# Patient Record
Sex: MALE | Race: WHITE | Employment: OTHER | URBAN - METROPOLITAN AREA
[De-identification: names, ages, dates, MRNs, and addresses within clinical notes are randomized per-mention and may not be internally consistent; named-entity substitution may affect disease eponyms.]

---

## 2017-01-12 ENCOUNTER — APPOINTMENT (OUTPATIENT)
Dept: LAB | Age: 67
End: 2017-01-12
Attending: FAMILY MEDICINE
Payer: MEDICARE

## 2017-01-12 ENCOUNTER — OFFICE VISIT (OUTPATIENT)
Dept: FAMILY MEDICINE CLINIC | Facility: CLINIC | Age: 67
End: 2017-01-12

## 2017-01-12 VITALS
WEIGHT: 182 LBS | HEART RATE: 60 BPM | DIASTOLIC BLOOD PRESSURE: 74 MMHG | HEIGHT: 66.5 IN | SYSTOLIC BLOOD PRESSURE: 144 MMHG | BODY MASS INDEX: 28.9 KG/M2

## 2017-01-12 DIAGNOSIS — Z12.5 SCREENING FOR PROSTATE CANCER: ICD-10-CM

## 2017-01-12 DIAGNOSIS — Z85.038 HISTORY OF COLON CANCER: ICD-10-CM

## 2017-01-12 DIAGNOSIS — Z00.00 ENCOUNTER FOR MEDICARE ANNUAL WELLNESS EXAM: ICD-10-CM

## 2017-01-12 DIAGNOSIS — M10.9 GOUT OF RIGHT FOOT, UNSPECIFIED CAUSE, UNSPECIFIED CHRONICITY: ICD-10-CM

## 2017-01-12 DIAGNOSIS — C43.9 MALIGNANT MELANOMA, UNSPECIFIED SITE (HCC): ICD-10-CM

## 2017-01-12 DIAGNOSIS — I25.10 CORONARY ARTERY DISEASE INVOLVING NATIVE CORONARY ARTERY OF NATIVE HEART WITHOUT ANGINA PECTORIS: ICD-10-CM

## 2017-01-12 DIAGNOSIS — I10 ESSENTIAL HYPERTENSION: ICD-10-CM

## 2017-01-12 DIAGNOSIS — Z86.59 HISTORY OF POSTTRAUMATIC STRESS DISORDER (PTSD): ICD-10-CM

## 2017-01-12 DIAGNOSIS — Z00.00 ENCOUNTER FOR MEDICARE ANNUAL WELLNESS EXAM: Primary | ICD-10-CM

## 2017-01-12 DIAGNOSIS — Z00.00 ENCOUNTER FOR ANNUAL HEALTH EXAMINATION: ICD-10-CM

## 2017-01-12 DIAGNOSIS — E87.6 HYPOKALEMIA: ICD-10-CM

## 2017-01-12 LAB
PSA SERPL-MCNC: 0.5 NG/ML (ref 0–4)
URATE SERPL-MCNC: 4.8 MG/DL (ref 3.3–8.7)

## 2017-01-12 PROCEDURE — 84550 ASSAY OF BLOOD/URIC ACID: CPT

## 2017-01-12 PROCEDURE — 36415 COLL VENOUS BLD VENIPUNCTURE: CPT

## 2017-01-12 PROCEDURE — G0439 PPPS, SUBSEQ VISIT: HCPCS | Performed by: FAMILY MEDICINE

## 2017-01-12 RX ORDER — ALLOPURINOL 100 MG/1
100 TABLET ORAL DAILY
Qty: 90 TABLET | Refills: 3 | Status: SHIPPED | OUTPATIENT
Start: 2017-01-12 | End: 2021-02-12

## 2017-01-12 NOTE — PROGRESS NOTES
HPI:   Heath Victor is a 74year old male who presents for a Medicare Initial Preventative Physical Exam (Welcome to Medicare- < 12 months on Medicare). .   His last annual assessment has been over 1 year: Annual Physical due on 08/25/2016 capsule (100 mg total) by mouth 3 (three) times daily. ATORVASTATIN CALCIUM 40 MG Oral Tab TAKE 1 TABLET BY MOUTH NIGHTLY   famoTIDine 20 MG Oral Tab Take 1 tablet (20 mg total) by mouth 2 (two) times daily as needed for Heartburn.    Clopidogrel Bisulfat Pulse 60  Ht 5' 6.5\" (1.689 m)  Wt 182 lb (82.555 kg)  BMI 28.94 kg/m2  Estimated body mass index is 28.94 kg/(m^2) as calculated from the following:    Height as of this encounter: 5' 6.5\" (1.689 m). Weight as of this encounter: 182 lb (82.555 kg). Normocephalic, without obvious abnormality, atraumatic   Eyes:  PERRL, conjunctiva/corneas clear, EOM's intact, both eyes   Ears:  Normal TM's and external ear canals, both ears   Nose: Nares normal, septum midline, mucosa normal, no drainage or sinus tend stress disorder (PTSD)  On meds.   Coronary artery disease involving native coronary artery of native heart without angina pectoris  F/u Dr. Fredi Cage  Hypokalemia  Has f/u ordered  Screening for prostate cancer  Will order test.       Mr. Chepe Adler already cyndee Problems? : No    Difficulty walking?: No    Difficulty dressing or bathing?: No    Problems with daily activities? : No    Memory Problems?: No      Fall/Risk Assessment     Do you have 3 or more medical conditions?: 1-Yes    Have you fallen in the last 1 Value   11/14/2016 83     CALCULATED LDL (mg/dL)   Date Value   08/25/2015 123*        EKG - w/ Initial Preventative Physical Exam only, or if medically necessary Electrocardiogram date08/29/2016    Colorectal Cancer Screening      Colonoscopy Screen every found.    Creat/alb ratio  Annually      LDL  Annually LDL CHOLESTEROL (mg/dL)   Date Value   11/14/2016 83     CALCULATED LDL (mg/dL)   Date Value   08/25/2015 123*    No flowsheet data found. Dilated Eye exam  Annually No flowsheet data found.   No fl

## 2017-01-12 NOTE — PATIENT INSTRUCTIONS
Mack Olivia's SCREENING SCHEDULE   Tests on this list are recommended by your physician but may not be covered, or covered at this frequency, by your insurer. Please check with your insurance carrier before scheduling to verify coverage.     Frankie Garay who meet one of the following criteria:   • Men who are 73-68 years old and have smoked more than 100 cigarettes in their lifetime   • Anyone with a family history    Colorectal Cancer Screening Covered up to Age 76     Colonoscopy Screen   Covered every 1 Illicit injectable drug abusers     Tetanus Toxoid- Only covered with a cut with metal- TD and TDaP Not covered by Medicare Part B) No orders found for this or any previous visit.  This may be covered with your prescription benefits, but Medicare does not

## 2017-02-09 ENCOUNTER — OFFICE VISIT (OUTPATIENT)
Dept: DERMATOLOGY CLINIC | Facility: CLINIC | Age: 67
End: 2017-02-09

## 2017-02-09 DIAGNOSIS — D23.30 BENIGN NEOPLASM OF SKIN OF FACE: ICD-10-CM

## 2017-02-09 DIAGNOSIS — L82.1 SEBORRHEIC KERATOSES: ICD-10-CM

## 2017-02-09 DIAGNOSIS — D23.60 BENIGN NEOPLASM OF SKIN OF UPPER LIMB, INCLUDING SHOULDER, UNSPECIFIED LATERALITY: ICD-10-CM

## 2017-02-09 DIAGNOSIS — D22.9 MULTIPLE NEVI: ICD-10-CM

## 2017-02-09 DIAGNOSIS — D23.5 BENIGN NEOPLASM OF SKIN OF TRUNK, EXCEPT SCROTUM: ICD-10-CM

## 2017-02-09 DIAGNOSIS — D23.70 BENIGN NEOPLASM OF SKIN OF LOWER LIMB, INCLUDING HIP, UNSPECIFIED LATERALITY: ICD-10-CM

## 2017-02-09 DIAGNOSIS — D23.4 BENIGN NEOPLASM OF SCALP AND SKIN OF NECK: ICD-10-CM

## 2017-02-09 DIAGNOSIS — Z85.820 PERSONAL HISTORY OF MALIGNANT MELANOMA OF SKIN: Primary | ICD-10-CM

## 2017-02-09 PROCEDURE — 99213 OFFICE O/P EST LOW 20 MIN: CPT | Performed by: DERMATOLOGY

## 2017-02-09 PROCEDURE — G0463 HOSPITAL OUTPT CLINIC VISIT: HCPCS | Performed by: DERMATOLOGY

## 2017-02-09 NOTE — PROGRESS NOTES
Past Medical History   Diagnosis Date   • Colon cancer ([facility] [address]    • Amputation, finger, traumatic      partial amputation of finger.  Multiple accidents with table saws   • Malignant melanoma of right upper extremity (HCC) Unknown     Excision   • High blood

## 2017-02-13 NOTE — PROGRESS NOTES
Sarah Neal is a 69year old male.   HPI:     CC:  Patient presents with:  Derm Problem: new patient.  (previous pt of SD)  Pt here for yearly full body skin exam.  Pt has hx of melanoma (right upper arm)        Allergies:  Review of patient's allergie Tab Take one and one-half tablet at bedtime. Disp: 45 tablet Rfl: 1   aspirin 81 MG Oral Tab Take 1 tablet (81 mg total) by mouth daily. Disp: 90 tablet Rfl: 3   hydrochlorothiazide 12.5 MG Oral Tab Take 1 tablet (12.5 mg total) by mouth daily.  Disp: 90 ta Former Smoker  0.00 Packs/Day     Types: Cigarettes    Smokeless tobacco: Never Used    Alcohol Use: Yes  3.0 oz/week    5 Cans of beer per week         Comment: beer, 4 beers weekly    Drug Use: No    Sexual Activity: Not on file   Not on file  Other Topi Assessment /Plan for additional history and physical exam also:    Assessment / plan:    No orders of the defined types were placed in this encounter.        Meds & Refills for this Visit:  No prescriptions requested or ordered in this encounter      Person

## 2017-04-20 ENCOUNTER — HOSPITAL ENCOUNTER (OUTPATIENT)
Age: 67
Discharge: HOME | End: 2017-04-20
Attending: EMERGENCY MEDICINE
Payer: MEDICARE

## 2017-04-20 VITALS
RESPIRATION RATE: 18 BRPM | HEART RATE: 55 BPM | TEMPERATURE: 97 F | OXYGEN SATURATION: 100 % | BODY MASS INDEX: 29.57 KG/M2 | DIASTOLIC BLOOD PRESSURE: 68 MMHG | WEIGHT: 184 LBS | HEIGHT: 66 IN | SYSTOLIC BLOOD PRESSURE: 144 MMHG

## 2017-04-20 DIAGNOSIS — H61.23 BILATERAL IMPACTED CERUMEN: Primary | ICD-10-CM

## 2017-04-20 PROCEDURE — 99204 OFFICE O/P NEW MOD 45 MIN: CPT

## 2017-04-20 PROCEDURE — 99213 OFFICE O/P EST LOW 20 MIN: CPT

## 2017-04-20 RX ORDER — AMOXICILLIN 500 MG/1
500 TABLET, FILM COATED ORAL 3 TIMES DAILY
Qty: 21 TABLET | Refills: 0 | Status: SHIPPED | OUTPATIENT
Start: 2017-04-20 | End: 2017-04-27

## 2017-04-20 NOTE — ED PROVIDER NOTES
Patient presents with:  Ear Pain      HPI:     Aditya Cuellar is a 69year old male who presents with a chief complaint of left ear pain. Onset of symptoms was in the past day.   Patient feels as though his left ear is clogged up and he has decreased hea patient develops fever or left ear pain to start amoxicillin      Orders Placed This Encounter  amoxicillin 500 MG Oral Tab   Sig: Take 1 tablet (500 mg total) by mouth 3 (three) times daily.    Dispense:  21 tablet   Refill:  0    Labs performed this visit

## 2017-04-20 NOTE — ED INITIAL ASSESSMENT (HPI)
C/o left ear \"clogged\" after showering yesterday. Wears hearing aid. No pain or fever. No dizziness.

## 2017-06-27 ENCOUNTER — PATIENT MESSAGE (OUTPATIENT)
Dept: FAMILY MEDICINE CLINIC | Facility: CLINIC | Age: 67
End: 2017-06-27

## 2017-06-27 ENCOUNTER — HOSPITAL ENCOUNTER (OUTPATIENT)
Age: 67
Discharge: ANOTHER HOSPITAL | End: 2017-06-27
Attending: EMERGENCY MEDICINE
Payer: MEDICARE

## 2017-06-27 ENCOUNTER — APPOINTMENT (OUTPATIENT)
Dept: CT IMAGING | Facility: HOSPITAL | Age: 67
End: 2017-06-27
Attending: EMERGENCY MEDICINE
Payer: MEDICARE

## 2017-06-27 ENCOUNTER — HOSPITAL ENCOUNTER (EMERGENCY)
Facility: HOSPITAL | Age: 67
Discharge: HOME | End: 2017-06-27
Attending: EMERGENCY MEDICINE
Payer: MEDICARE

## 2017-06-27 VITALS
OXYGEN SATURATION: 98 % | BODY MASS INDEX: 29.82 KG/M2 | TEMPERATURE: 98 F | WEIGHT: 190 LBS | HEART RATE: 66 BPM | RESPIRATION RATE: 17 BRPM | DIASTOLIC BLOOD PRESSURE: 84 MMHG | SYSTOLIC BLOOD PRESSURE: 158 MMHG | HEIGHT: 67 IN

## 2017-06-27 VITALS
HEART RATE: 72 BPM | WEIGHT: 190 LBS | RESPIRATION RATE: 18 BRPM | DIASTOLIC BLOOD PRESSURE: 68 MMHG | BODY MASS INDEX: 29.82 KG/M2 | HEIGHT: 67 IN | SYSTOLIC BLOOD PRESSURE: 138 MMHG | TEMPERATURE: 98 F | OXYGEN SATURATION: 99 %

## 2017-06-27 DIAGNOSIS — Z79.01 CHRONIC ANTICOAGULATION: ICD-10-CM

## 2017-06-27 DIAGNOSIS — S00.03XA CONTUSION OF SCALP, INITIAL ENCOUNTER: Primary | ICD-10-CM

## 2017-06-27 DIAGNOSIS — Z79.02 ANTIPLATELET OR ANTITHROMBOTIC LONG-TERM USE: ICD-10-CM

## 2017-06-27 DIAGNOSIS — S09.8XXA BLUNT HEAD TRAUMA, INITIAL ENCOUNTER: Primary | ICD-10-CM

## 2017-06-27 PROCEDURE — 70450 CT HEAD/BRAIN W/O DYE: CPT | Performed by: EMERGENCY MEDICINE

## 2017-06-27 PROCEDURE — 99215 OFFICE O/P EST HI 40 MIN: CPT

## 2017-06-27 PROCEDURE — 99284 EMERGENCY DEPT VISIT MOD MDM: CPT

## 2017-06-27 NOTE — ED PROVIDER NOTES
Patient Seen in: [address]    History   Patient presents with:  Head Injury    Stated Complaint: head injury    HPI    The patient is a 58-year-old male who is on Plavix and aspirin for cardiac stent presents with compla tablet (20 mg total) by mouth 2 (two) times daily as needed for Heartburn. Clopidogrel Bisulfate (PLAVIX) 75 MG Oral Tab,  Take 1 tablet (75 mg total) by mouth daily. cholecalciferol ( VITAMIN D) 1000 UNITS Oral Cap,  Take  by mouth.    Multiple Vitam Course  ------------------------------------------------------------  [facility]     Instructed patient that he is at risk for intracranial injury even though he is asymptomatic at this time. Instructed patient to go to the emergency department.   Patient refuses

## 2017-06-27 NOTE — ED NOTES
Patient advised to go to [address] [place] [address] ED for further evaluation of head injury. Declines 911.

## 2017-06-27 NOTE — ED INITIAL ASSESSMENT (HPI)
45 min PTA hit in head with a piece of drywall knocking him down from scaffolding about 2 ft high falling forward onto concrete. No LOC. Abrasion noted to forehead with large hematoma. Denies neck pain. No nausea or dizziness.

## 2017-06-27 NOTE — ED INITIAL ASSESSMENT (HPI)
Pt sent from [facility] post fall from 2 ft ladder at work when drywall hit in back in head and caused pt to fall face forward onto concrete. Large hematoma with lac to forehead. Pt is on Plavix and aspirin daily. Denies LOC, but did hit head.  Pt is A&Ox4

## 2017-06-27 NOTE — ED PROVIDER NOTES
Patient Seen in: [facility] AND [facility] Emergency Department    History   Patient presents with:  Head Neck Injury (neurologic, musculoskeletal)    Stated Complaint: Pt hit in the back of head with dry wall while at work- states fell off a 16 in*    HPI    Pa 12.5 MG Oral Tab,  Take 1 tablet (12.5 mg total) by mouth daily. Potassium Chloride ER 10 MEQ Oral Tab CR,  Take 1 tablet (10 mEq total) by mouth 2 (two) times daily.    FINASTERIDE 5 MG Oral Tab,  TAKE 1 TABLET BY MOUTH DAILY   ATORVASTATIN CALCIUM 40 MG 95 %  O2 Device: None (Room air)    Current:/84   Pulse 66   Temp 98.4 °F (36.9 °C) (Oral)   Resp 17   Ht 170.2 cm (5' 7\")   Wt 86.2 kg   SpO2 98%   BMI 29.76 kg/m²         Physical Exam  Constitutional:  Alert, well nourished adult lying in bed in

## 2017-06-28 NOTE — TELEPHONE ENCOUNTER
From: Vini Pereira  To: Vineet Calvo DO  Sent: 6/27/2017 9:21 AM CDT  Subject: Other    I take 100 mgs of Sertraline daily as prescribed by Dr Marisas Ureña for PTSD. I get the med from her and that's all!    I stopped seeing Dr Frederick Campos

## 2017-06-29 ENCOUNTER — APPOINTMENT (OUTPATIENT)
Dept: LAB | Age: 67
End: 2017-06-29
Attending: INTERNAL MEDICINE
Payer: MEDICARE

## 2017-07-03 ENCOUNTER — APPOINTMENT (OUTPATIENT)
Dept: LAB | Age: 67
End: 2017-07-03
Attending: INTERNAL MEDICINE
Payer: MEDICARE

## 2017-07-03 DIAGNOSIS — I10 ESSENTIAL HYPERTENSION WITH GOAL BLOOD PRESSURE LESS THAN 140/90: ICD-10-CM

## 2017-07-03 DIAGNOSIS — E78.2 MIXED HYPERLIPIDEMIA: ICD-10-CM

## 2017-07-03 LAB
ALBUMIN SERPL BCP-MCNC: 4.3 G/DL (ref 3.5–4.8)
ALP SERPL-CCNC: 82 U/L (ref 32–100)
ALT SERPL-CCNC: 28 U/L (ref 17–63)
ANION GAP SERPL CALC-SCNC: 10 MMOL/L (ref 0–18)
AST SERPL-CCNC: 26 U/L (ref 15–41)
BILIRUB DIRECT SERPL-MCNC: 0.2 MG/DL (ref 0–0.2)
BILIRUB SERPL-MCNC: 0.9 MG/DL (ref 0.3–1.2)
BUN SERPL-MCNC: 15 MG/DL (ref 8–20)
BUN/CREAT SERPL: 18.3 (ref 10–20)
CALCIUM SERPL-MCNC: 9.4 MG/DL (ref 8.5–10.5)
CHLORIDE SERPL-SCNC: 99 MMOL/L (ref 95–110)
CHOLEST SERPL-MCNC: 165 MG/DL (ref 110–200)
CO2 SERPL-SCNC: 27 MMOL/L (ref 22–32)
CREAT SERPL-MCNC: 0.82 MG/DL (ref 0.5–1.5)
GLUCOSE SERPL-MCNC: 88 MG/DL (ref 70–99)
HDLC SERPL-MCNC: 52 MG/DL
LDLC SERPL CALC-MCNC: 96 MG/DL (ref 0–99)
NONHDLC SERPL-MCNC: 113 MG/DL
OSMOLALITY UR CALC.SUM OF ELEC: 282 MOSM/KG (ref 275–295)
POTASSIUM SERPL-SCNC: 3.4 MMOL/L (ref 3.3–5.1)
PROT SERPL-MCNC: 6.7 G/DL (ref 5.9–8.4)
SODIUM SERPL-SCNC: 136 MMOL/L (ref 136–144)
TRIGL SERPL-MCNC: 84 MG/DL (ref 1–149)

## 2017-07-03 PROCEDURE — 36415 COLL VENOUS BLD VENIPUNCTURE: CPT

## 2017-07-03 PROCEDURE — 80076 HEPATIC FUNCTION PANEL: CPT

## 2017-07-03 PROCEDURE — 80048 BASIC METABOLIC PNL TOTAL CA: CPT

## 2017-07-03 PROCEDURE — 80061 LIPID PANEL: CPT

## 2017-08-31 PROCEDURE — 88305 TISSUE EXAM BY PATHOLOGIST: CPT | Performed by: INTERNAL MEDICINE

## 2017-09-02 PROBLEM — D12.6 TUBULAR ADENOMA OF COLON: Status: ACTIVE | Noted: 2017-09-02

## 2017-09-20 RX ORDER — ATORVASTATIN CALCIUM 40 MG/1
TABLET, FILM COATED ORAL
Qty: 60 TABLET | Refills: 0 | Status: SHIPPED | OUTPATIENT
Start: 2017-09-20 | End: 2017-11-27

## 2017-09-20 NOTE — TELEPHONE ENCOUNTER
Chart reviewed, atorvastatin 40 mg refilled for 2 months.     Cholesterol Medications  Protocol Criteria:  · Appointment scheduled in the past 12 months or in the next 3 months  · ALT & LDL on file in the past 12 months  · ALT result < 80  · LDL result <130

## 2017-11-27 ENCOUNTER — TELEPHONE (OUTPATIENT)
Dept: FAMILY MEDICINE CLINIC | Facility: CLINIC | Age: 67
End: 2017-11-27

## 2017-11-27 RX ORDER — ATORVASTATIN CALCIUM 40 MG/1
TABLET, FILM COATED ORAL
Qty: 180 TABLET | Refills: 0 | Status: SHIPPED | OUTPATIENT
Start: 2017-11-27 | End: 2018-05-18

## 2017-11-27 NOTE — TELEPHONE ENCOUNTER
Refilled per written protocol.     Cholesterol Medications  Protocol Criteria:  · Appointment scheduled in the past 12 months or in the next 3 months  · ALT & LDL on file in the past 12 months  · ALT result < 80  · LDL result <130   Recent Outpatient Visits

## 2017-11-27 NOTE — TELEPHONE ENCOUNTER
Pharmacy calling in requesting refill om medication below. Please advise.        Current Outpatient Prescriptions:   •  ATORVASTATIN 40 MG Oral Tab, TAKE 1 TABLET BY MOUTH EVERY NIGHT AT BEDTIME, Disp: 60 tablet, Rfl: 0

## 2018-01-09 RX ORDER — FINASTERIDE 5 MG/1
TABLET, FILM COATED ORAL
Qty: 90 TABLET | Refills: 0 | Status: SHIPPED | OUTPATIENT
Start: 2018-01-09 | End: 2018-04-08

## 2018-01-26 ENCOUNTER — OFFICE VISIT (OUTPATIENT)
Dept: FAMILY MEDICINE CLINIC | Facility: CLINIC | Age: 68
End: 2018-01-26

## 2018-01-26 VITALS
WEIGHT: 195 LBS | DIASTOLIC BLOOD PRESSURE: 75 MMHG | HEIGHT: 66.5 IN | SYSTOLIC BLOOD PRESSURE: 144 MMHG | HEART RATE: 65 BPM | TEMPERATURE: 98 F | BODY MASS INDEX: 30.97 KG/M2

## 2018-01-26 DIAGNOSIS — J06.9 VIRAL UPPER RESPIRATORY TRACT INFECTION: Primary | ICD-10-CM

## 2018-01-26 PROCEDURE — 99213 OFFICE O/P EST LOW 20 MIN: CPT | Performed by: NURSE PRACTITIONER

## 2018-01-26 NOTE — PROGRESS NOTES
HPI  Pt here for cough, sore throat and runny nose for past day or so. Denies fever, body aches and chills. Took an otc cold medicine-no improvement in s/s. Wife and granddaughter had similar s/s but worse, daughter had the flu.       Review of Systems   C Relation Age of Onset   • Heart Disease Father      CAD   • Hypertension Father    • Heart Disease Mother      CAD   • Hypertension Mother          Social History  Social History   Marital status:   Spouse name: N/A    Years of education: N/A  Mari Rfl:    Multiple Vitamin (MULTI-DAY) Oral Tab Take  by mouth. Disp:  Rfl:        Allergies:  No Known Allergies    Physical Exam   Constitutional: He is oriented to person, place, and time. He appears well-developed and well-nourished.    HENT:   Head: Norm

## 2018-03-12 ENCOUNTER — TELEPHONE (OUTPATIENT)
Dept: FAMILY MEDICINE CLINIC | Facility: CLINIC | Age: 68
End: 2018-03-12

## 2018-03-12 DIAGNOSIS — Z01.10 ENCOUNTER FOR HEARING TEST: Primary | ICD-10-CM

## 2018-03-12 NOTE — TELEPHONE ENCOUNTER
MEDICARE REQUIRES AN ORDER FORE PATIENT TO SEE DR HAYS -- TOMORROW IN [place], [place] AT 3:30- PM    NEEDS AUDITORY TESTING   HEARING TEST.   FAX  # [phone]    Managed care does  Not need referral only a doctors's order          PATIENT NEEDS CALL NAEEM

## 2018-04-12 RX ORDER — FINASTERIDE 5 MG/1
TABLET, FILM COATED ORAL
Qty: 90 TABLET | Refills: 0 | Status: SHIPPED | OUTPATIENT
Start: 2018-04-12 | End: 2018-07-13

## 2018-05-18 RX ORDER — ATORVASTATIN CALCIUM 40 MG/1
TABLET, FILM COATED ORAL
Qty: 180 TABLET | Refills: 0 | Status: SHIPPED | OUTPATIENT
Start: 2018-05-18 | End: 2018-08-17

## 2018-05-18 NOTE — TELEPHONE ENCOUNTER
Cholesterol Medications  Protocol Criteria:  · Appointment scheduled in the past 12 months or in the next 3 months  · ALT & LDL on file in the past 12 months  · ALT result < 80  · LDL result <130   Recent Outpatient Visits            3 months ago Viral upp

## 2018-07-14 NOTE — TELEPHONE ENCOUNTER
Failed per nursing protocol - please advise on refill request     LOV: 1-26-18 Last Rx: 4-12-18     No protocol     Please advise in regards to refill request. Thank You        Hypertensive Medications  Protocol Criteria:  · Appointment scheduled in the p

## 2018-07-19 RX ORDER — FINASTERIDE 5 MG/1
TABLET, FILM COATED ORAL
Qty: 30 TABLET | Refills: 0 | Status: SHIPPED | OUTPATIENT
Start: 2018-07-19 | End: 2018-08-22

## 2018-07-19 NOTE — TELEPHONE ENCOUNTER
Please see below and advise    Unable to fill per protocol    Sent to [facility] for Aia [address] (out of office) and AMA (on call)

## 2018-07-21 RX ORDER — FINASTERIDE 5 MG/1
TABLET, FILM COATED ORAL
Qty: 90 TABLET | Refills: 0 | OUTPATIENT
Start: 2018-07-21

## 2018-08-14 ENCOUNTER — OFFICE VISIT (OUTPATIENT)
Dept: FAMILY MEDICINE CLINIC | Facility: CLINIC | Age: 68
End: 2018-08-14
Payer: MEDICARE

## 2018-08-14 ENCOUNTER — HOSPITAL ENCOUNTER (OUTPATIENT)
Dept: GENERAL RADIOLOGY | Age: 68
Discharge: HOME | End: 2018-08-14
Attending: FAMILY MEDICINE | Admitting: FAMILY MEDICINE
Payer: MEDICARE

## 2018-08-14 ENCOUNTER — LAB ENCOUNTER (OUTPATIENT)
Dept: LAB | Age: 68
End: 2018-08-14
Attending: INTERNAL MEDICINE
Payer: MEDICARE

## 2018-08-14 VITALS
DIASTOLIC BLOOD PRESSURE: 70 MMHG | HEART RATE: 56 BPM | SYSTOLIC BLOOD PRESSURE: 146 MMHG | BODY MASS INDEX: 30.53 KG/M2 | HEIGHT: 66 IN | TEMPERATURE: 98 F | WEIGHT: 190 LBS | RESPIRATION RATE: 16 BRPM

## 2018-08-14 DIAGNOSIS — Z12.5 PROSTATE CANCER SCREENING: Primary | ICD-10-CM

## 2018-08-14 DIAGNOSIS — I10 ESSENTIAL HYPERTENSION WITH GOAL BLOOD PRESSURE LESS THAN 140/90: ICD-10-CM

## 2018-08-14 DIAGNOSIS — M79.605 PAIN OF LEFT LOWER EXTREMITY: ICD-10-CM

## 2018-08-14 DIAGNOSIS — Z12.5 PROSTATE CANCER SCREENING: ICD-10-CM

## 2018-08-14 DIAGNOSIS — E78.2 MIXED HYPERLIPIDEMIA: ICD-10-CM

## 2018-08-14 LAB
ALBUMIN SERPL BCP-MCNC: 4.1 G/DL (ref 3.5–4.8)
ALP SERPL-CCNC: 101 U/L (ref 32–100)
ALT SERPL-CCNC: 18 U/L (ref 17–63)
ANION GAP SERPL CALC-SCNC: 10 MMOL/L (ref 0–18)
AST SERPL-CCNC: 20 U/L (ref 15–41)
BASOPHILS # BLD: 0.1 K/UL (ref 0–0.2)
BASOPHILS NFR BLD: 1 %
BILIRUB DIRECT SERPL-MCNC: 0.1 MG/DL (ref 0–0.2)
BILIRUB SERPL-MCNC: 0.8 MG/DL (ref 0.3–1.2)
BUN SERPL-MCNC: 10 MG/DL (ref 8–20)
BUN/CREAT SERPL: 11.8 (ref 10–20)
CALCIUM SERPL-MCNC: 9.6 MG/DL (ref 8.5–10.5)
CHLORIDE SERPL-SCNC: 101 MMOL/L (ref 95–110)
CHOLEST SERPL-MCNC: 184 MG/DL (ref 110–200)
CO2 SERPL-SCNC: 30 MMOL/L (ref 22–32)
CREAT SERPL-MCNC: 0.85 MG/DL (ref 0.5–1.5)
EOSINOPHIL # BLD: 0 K/UL (ref 0–0.7)
EOSINOPHIL NFR BLD: 0 %
ERYTHROCYTE [DISTWIDTH] IN BLOOD BY AUTOMATED COUNT: 13.8 % (ref 11–15)
GLUCOSE SERPL-MCNC: 119 MG/DL (ref 70–99)
HCT VFR BLD AUTO: 42.5 % (ref 41–52)
HDLC SERPL-MCNC: 63 MG/DL
HGB BLD-MCNC: 14.6 G/DL (ref 13.5–17.5)
LDLC SERPL CALC-MCNC: 100 MG/DL (ref 0–99)
LYMPHOCYTES # BLD: 1.1 K/UL (ref 1–4)
LYMPHOCYTES NFR BLD: 11 %
MCH RBC QN AUTO: 32.6 PG (ref 27–32)
MCHC RBC AUTO-ENTMCNC: 34.3 G/DL (ref 32–37)
MCV RBC AUTO: 95.2 FL (ref 80–100)
MONOCYTES # BLD: 0.7 K/UL (ref 0–1)
MONOCYTES NFR BLD: 7 %
NEUTROPHILS # BLD AUTO: 8.1 K/UL (ref 1.8–7.7)
NEUTROPHILS NFR BLD: 81 %
NONHDLC SERPL-MCNC: 121 MG/DL
OSMOLALITY UR CALC.SUM OF ELEC: 292 MOSM/KG (ref 275–295)
PLATELET # BLD AUTO: 268 K/UL (ref 140–400)
PMV BLD AUTO: 7.7 FL (ref 7.4–10.3)
POTASSIUM SERPL-SCNC: 4.6 MMOL/L (ref 3.3–5.1)
PROT SERPL-MCNC: 7 G/DL (ref 5.9–8.4)
PSA SERPL-MCNC: 0.4 NG/ML (ref 0–4)
RBC # BLD AUTO: 4.47 M/UL (ref 4.5–5.9)
SODIUM SERPL-SCNC: 141 MMOL/L (ref 136–144)
TRIGL SERPL-MCNC: 103 MG/DL (ref 1–149)
URATE SERPL-MCNC: 7.5 MG/DL (ref 3.3–8.7)
WBC # BLD AUTO: 10.1 K/UL (ref 4–11)

## 2018-08-14 PROCEDURE — 36415 COLL VENOUS BLD VENIPUNCTURE: CPT

## 2018-08-14 PROCEDURE — 90670 PCV13 VACCINE IM: CPT | Performed by: FAMILY MEDICINE

## 2018-08-14 PROCEDURE — G0463 HOSPITAL OUTPT CLINIC VISIT: HCPCS | Performed by: FAMILY MEDICINE

## 2018-08-14 PROCEDURE — 80061 LIPID PANEL: CPT

## 2018-08-14 PROCEDURE — 80048 BASIC METABOLIC PNL TOTAL CA: CPT

## 2018-08-14 PROCEDURE — 99213 OFFICE O/P EST LOW 20 MIN: CPT | Performed by: FAMILY MEDICINE

## 2018-08-14 PROCEDURE — G0009 ADMIN PNEUMOCOCCAL VACCINE: HCPCS | Performed by: FAMILY MEDICINE

## 2018-08-14 PROCEDURE — 85025 COMPLETE CBC W/AUTO DIFF WBC: CPT

## 2018-08-14 PROCEDURE — 73630 X-RAY EXAM OF FOOT: CPT | Performed by: FAMILY MEDICINE

## 2018-08-14 PROCEDURE — 84550 ASSAY OF BLOOD/URIC ACID: CPT

## 2018-08-14 PROCEDURE — 80076 HEPATIC FUNCTION PANEL: CPT

## 2018-08-14 RX ORDER — FINASTERIDE 5 MG/1
TABLET, FILM COATED ORAL
Qty: 90 TABLET | Refills: 0 | Status: CANCELLED | OUTPATIENT
Start: 2018-08-14

## 2018-08-14 NOTE — PROGRESS NOTES
Blood pressure 146/70, pulse 56, temperature 98 °F (36.7 °C), temperature source Oral, resp. rate 16, height 5' 6\" (1.676 m), weight 190 lb (86.2 kg). Patient presents today following up for nasal fracture that occurred 1 month ago while working.   He r

## 2018-08-15 ENCOUNTER — TELEPHONE (OUTPATIENT)
Dept: FAMILY MEDICINE CLINIC | Facility: CLINIC | Age: 68
End: 2018-08-15

## 2018-08-15 DIAGNOSIS — I10 ESSENTIAL HYPERTENSION: ICD-10-CM

## 2018-08-15 DIAGNOSIS — F43.10 PTSD (POST-TRAUMATIC STRESS DISORDER): ICD-10-CM

## 2018-08-15 RX ORDER — SERTRALINE HYDROCHLORIDE 100 MG/1
TABLET, FILM COATED ORAL
Qty: 90 TABLET | Refills: 0 | Status: SHIPPED | OUTPATIENT
Start: 2018-08-15 | End: 2018-11-06

## 2018-08-15 NOTE — TELEPHONE ENCOUNTER
----- Message from Buddie Soulier, RN sent at 8/15/2018 12:22 PM CDT -----  Regarding: FW: Prescription Question  Contact: [phone]      ----- Message -----  From: Indy Brock  Sent: 8/14/2018   1:08 PM  To: Em Rn Triage  Subject: John Dietrich

## 2018-08-17 NOTE — TELEPHONE ENCOUNTER
[facility] [phone] is calling for status of medication refill request. Pt is out of medication.

## 2018-08-22 RX ORDER — FINASTERIDE 5 MG/1
5 TABLET, FILM COATED ORAL
Qty: 90 TABLET | Refills: 3 | Status: SHIPPED | OUTPATIENT
Start: 2018-08-22 | End: 2019-08-09

## 2018-08-22 NOTE — TELEPHONE ENCOUNTER
Protocol is not running, please advise regarding refill. LOV 8/14/18    CSS, please call and assist pt in scheduling annual physical (mentioned by  in [address] [place] [address]). Thank you.

## 2018-08-24 NOTE — TELEPHONE ENCOUNTER
Both sertraline and finesteride were medications being requested for refills and both addressed and refilled by PCP on 8/15/18 and 8/22/18

## 2018-09-25 ENCOUNTER — HOSPITAL ENCOUNTER (OUTPATIENT)
Dept: GENERAL RADIOLOGY | Age: 68
Discharge: HOME | End: 2018-09-25
Attending: FAMILY MEDICINE | Admitting: FAMILY MEDICINE
Payer: MEDICARE

## 2018-09-25 ENCOUNTER — OFFICE VISIT (OUTPATIENT)
Dept: FAMILY MEDICINE CLINIC | Facility: CLINIC | Age: 68
End: 2018-09-25
Payer: MEDICARE

## 2018-09-25 VITALS
SYSTOLIC BLOOD PRESSURE: 155 MMHG | BODY MASS INDEX: 31.18 KG/M2 | HEART RATE: 56 BPM | DIASTOLIC BLOOD PRESSURE: 82 MMHG | WEIGHT: 194 LBS | HEIGHT: 66 IN

## 2018-09-25 DIAGNOSIS — M25.571 ACUTE RIGHT ANKLE PAIN: Primary | ICD-10-CM

## 2018-09-25 DIAGNOSIS — M25.572 ACUTE LEFT ANKLE PAIN: ICD-10-CM

## 2018-09-25 PROCEDURE — 99213 OFFICE O/P EST LOW 20 MIN: CPT | Performed by: FAMILY MEDICINE

## 2018-09-25 PROCEDURE — G0463 HOSPITAL OUTPT CLINIC VISIT: HCPCS | Performed by: FAMILY MEDICINE

## 2018-09-25 PROCEDURE — 73610 X-RAY EXAM OF ANKLE: CPT | Performed by: FAMILY MEDICINE

## 2018-09-25 RX ORDER — METHYLPREDNISOLONE 4 MG/1
TABLET ORAL
Qty: 1 KIT | Refills: 1 | Status: SHIPPED | OUTPATIENT
Start: 2018-09-25 | End: 2019-01-09 | Stop reason: ALTCHOICE

## 2018-09-25 RX ORDER — CEPHALEXIN 500 MG/1
500 TABLET ORAL 4 TIMES DAILY
Qty: 40 TABLET | Refills: 0 | Status: SHIPPED | OUTPATIENT
Start: 2018-09-25 | End: 2019-02-07 | Stop reason: ALTCHOICE

## 2018-09-25 NOTE — PROGRESS NOTES
Blood pressure 155/82, pulse 56, height 5' 6\" (1.676 m), weight 194 lb (88 kg). Patient presents today complaining of a week of left ankle pain and swelling denies trauma. No fevers. He has had gout before. He otherwise feels well.   He did take a

## 2018-09-27 ENCOUNTER — LAB ENCOUNTER (OUTPATIENT)
Dept: LAB | Age: 68
End: 2018-09-27
Attending: INTERNAL MEDICINE
Payer: MEDICARE

## 2018-09-27 DIAGNOSIS — E78.2 MIXED HYPERLIPIDEMIA: ICD-10-CM

## 2018-09-27 DIAGNOSIS — M25.572 ACUTE LEFT ANKLE PAIN: ICD-10-CM

## 2018-09-27 LAB
ALBUMIN SERPL BCP-MCNC: 4 G/DL (ref 3.5–4.8)
ALP SERPL-CCNC: 95 U/L (ref 32–100)
ALT SERPL-CCNC: 27 U/L (ref 17–63)
AST SERPL-CCNC: 26 U/L (ref 15–41)
BASOPHILS # BLD: 0 K/UL (ref 0–0.2)
BASOPHILS NFR BLD: 0 %
BILIRUB DIRECT SERPL-MCNC: 0.1 MG/DL (ref 0–0.2)
BILIRUB SERPL-MCNC: 0.6 MG/DL (ref 0.3–1.2)
CHOLEST SERPL-MCNC: 163 MG/DL (ref 110–200)
EOSINOPHIL # BLD: 0 K/UL (ref 0–0.7)
EOSINOPHIL NFR BLD: 0 %
ERYTHROCYTE [DISTWIDTH] IN BLOOD BY AUTOMATED COUNT: 13.4 % (ref 11–15)
HCT VFR BLD AUTO: 42.6 % (ref 41–52)
HDLC SERPL-MCNC: 77 MG/DL
HGB BLD-MCNC: 14.5 G/DL (ref 13.5–17.5)
LDLC SERPL CALC-MCNC: 75 MG/DL (ref 0–99)
LYMPHOCYTES # BLD: 0.7 K/UL (ref 1–4)
LYMPHOCYTES NFR BLD: 6 %
MCH RBC QN AUTO: 32.1 PG (ref 27–32)
MCHC RBC AUTO-ENTMCNC: 34.1 G/DL (ref 32–37)
MCV RBC AUTO: 94.1 FL (ref 80–100)
MONOCYTES # BLD: 0.3 K/UL (ref 0–1)
MONOCYTES NFR BLD: 2 %
NEUTROPHILS # BLD AUTO: 11.1 K/UL (ref 1.8–7.7)
NEUTROPHILS NFR BLD: 92 %
NONHDLC SERPL-MCNC: 86 MG/DL
PLATELET # BLD AUTO: 272 K/UL (ref 140–400)
PMV BLD AUTO: 7.5 FL (ref 7.4–10.3)
PROT SERPL-MCNC: 7 G/DL (ref 5.9–8.4)
RBC # BLD AUTO: 4.53 M/UL (ref 4.5–5.9)
TRIGL SERPL-MCNC: 55 MG/DL (ref 1–149)
URATE UR-MCNC: 35.3 MG/DL
WBC # BLD AUTO: 12.1 K/UL (ref 4–11)

## 2018-09-27 PROCEDURE — 36415 COLL VENOUS BLD VENIPUNCTURE: CPT

## 2018-09-27 PROCEDURE — 84560 ASSAY OF URINE/URIC ACID: CPT

## 2018-09-27 PROCEDURE — 80076 HEPATIC FUNCTION PANEL: CPT

## 2018-09-27 PROCEDURE — 85025 COMPLETE CBC W/AUTO DIFF WBC: CPT

## 2018-09-27 PROCEDURE — 80061 LIPID PANEL: CPT

## 2018-11-06 DIAGNOSIS — I10 ESSENTIAL HYPERTENSION: ICD-10-CM

## 2018-11-06 DIAGNOSIS — F43.10 PTSD (POST-TRAUMATIC STRESS DISORDER): ICD-10-CM

## 2018-11-06 RX ORDER — SERTRALINE HYDROCHLORIDE 100 MG/1
TABLET, FILM COATED ORAL
Qty: 90 TABLET | Refills: 0 | Status: SHIPPED | OUTPATIENT
Start: 2018-11-06 | End: 2019-02-01

## 2019-01-09 ENCOUNTER — OFFICE VISIT (OUTPATIENT)
Dept: FAMILY MEDICINE CLINIC | Facility: CLINIC | Age: 69
End: 2019-01-09
Payer: MEDICARE

## 2019-01-09 VITALS
HEART RATE: 68 BPM | HEIGHT: 66 IN | SYSTOLIC BLOOD PRESSURE: 132 MMHG | BODY MASS INDEX: 32.33 KG/M2 | WEIGHT: 201.19 LBS | DIASTOLIC BLOOD PRESSURE: 69 MMHG

## 2019-01-09 DIAGNOSIS — L30.0 NUMMULAR ECZEMA: Primary | ICD-10-CM

## 2019-01-09 PROCEDURE — 99213 OFFICE O/P EST LOW 20 MIN: CPT | Performed by: FAMILY MEDICINE

## 2019-01-09 PROCEDURE — G0463 HOSPITAL OUTPT CLINIC VISIT: HCPCS | Performed by: FAMILY MEDICINE

## 2019-01-09 RX ORDER — MOMETASONE FUROATE 1 MG/G
1 CREAM TOPICAL 2 TIMES DAILY
Qty: 50 G | Refills: 0 | Status: SHIPPED | OUTPATIENT
Start: 2019-01-09 | End: 2019-11-08

## 2019-01-09 NOTE — PROGRESS NOTES
Blood pressure 132/69, pulse 68, height 5' 6\" (1.676 m), weight 201 lb 3 oz (91.3 kg). Patient presents today complaining of a rash on his legs and torso for the past couple of weeks. No itching no burning. Patient otherwise feels well.   No history o

## 2019-02-01 DIAGNOSIS — F43.10 PTSD (POST-TRAUMATIC STRESS DISORDER): ICD-10-CM

## 2019-02-01 DIAGNOSIS — I10 ESSENTIAL HYPERTENSION: ICD-10-CM

## 2019-02-01 RX ORDER — SERTRALINE HYDROCHLORIDE 100 MG/1
TABLET, FILM COATED ORAL
Qty: 90 TABLET | Refills: 0 | Status: SHIPPED | OUTPATIENT
Start: 2019-02-01 | End: 2019-05-03

## 2019-02-07 ENCOUNTER — OFFICE VISIT (OUTPATIENT)
Dept: FAMILY MEDICINE CLINIC | Facility: CLINIC | Age: 69
End: 2019-02-07
Payer: MEDICARE

## 2019-02-07 VITALS
SYSTOLIC BLOOD PRESSURE: 130 MMHG | DIASTOLIC BLOOD PRESSURE: 70 MMHG | HEIGHT: 66 IN | HEART RATE: 56 BPM | RESPIRATION RATE: 16 BRPM | TEMPERATURE: 98 F | BODY MASS INDEX: 32.62 KG/M2 | WEIGHT: 203 LBS

## 2019-02-07 DIAGNOSIS — J06.9 VIRAL UPPER RESPIRATORY INFECTION: Primary | ICD-10-CM

## 2019-02-07 PROCEDURE — 99213 OFFICE O/P EST LOW 20 MIN: CPT | Performed by: PHYSICIAN ASSISTANT

## 2019-02-07 PROCEDURE — G0463 HOSPITAL OUTPT CLINIC VISIT: HCPCS | Performed by: PHYSICIAN ASSISTANT

## 2019-02-07 RX ORDER — BENZONATATE 200 MG/1
200 CAPSULE ORAL 3 TIMES DAILY PRN
Qty: 30 CAPSULE | Refills: 0 | Status: SHIPPED | OUTPATIENT
Start: 2019-02-07 | End: 2019-05-07 | Stop reason: ALTCHOICE

## 2019-02-07 RX ORDER — LEVOCETIRIZINE DIHYDROCHLORIDE 5 MG/1
5 TABLET, FILM COATED ORAL EVERY EVENING
Qty: 90 TABLET | Refills: 1 | Status: SHIPPED | OUTPATIENT
Start: 2019-02-07

## 2019-02-07 NOTE — PROGRESS NOTES
HPI: URI    This is a new problem. The current episode started in the past 7 days. The problem has been unchanged. There has been no fever. Associated symptoms include congestion, coughing and rhinorrhea.  Pertinent negatives include no abdominal pain, ch tablet Rfl: 11   cholecalciferol ( VITAMIN D) 1000 UNITS Oral Cap Take  by mouth. Disp:  Rfl:    Multiple Vitamin (MULTI-DAY) Oral Tab Take  by mouth.  Disp:  Rfl:        Allergies:   No Known Allergies    History:   Annual Depression Screen due on 01/12/ on file    Social Needs      Financial resource strain: Not on file      Food insecurity - worry: Not on file      Food insecurity - inability: Not on file      Transportation needs - medical: Not on file      Transportation needs - non-medical: Not on shahnaz HENT: Positive for congestion and rhinorrhea. Negative for ear pain, sinus pressure, sinus pain, sneezing and sore throat. Respiratory: Positive for cough. Negative for chest tightness, shortness of breath and wheezing. Cardiovascular: Negative.   Chance Begum person, place, and time. He has normal reflexes. Skin: Skin is intact. No rash noted. Psychiatric: He has a normal mood and affect.        Assessment and Plan[de-identified]     Problem List Items Addressed This Visit        Infectious/Inflammatory    Viral upper re

## 2019-02-07 NOTE — ASSESSMENT & PLAN NOTE
Continue symptomatic treatment for URI, likely viral in origin. Discussed with patient to rest when needed but no activity restrictions, use mask if coughing/sneezing, regular hand washing with soap and water, adequate hydration and nutrition.  Use tylenol

## 2019-02-27 ENCOUNTER — OFFICE VISIT (OUTPATIENT)
Dept: OTOLARYNGOLOGY | Facility: CLINIC | Age: 69
End: 2019-02-27
Payer: MEDICARE

## 2019-02-27 ENCOUNTER — OFFICE VISIT (OUTPATIENT)
Dept: DERMATOLOGY CLINIC | Facility: CLINIC | Age: 69
End: 2019-02-27
Payer: MEDICARE

## 2019-02-27 VITALS
BODY MASS INDEX: 32.62 KG/M2 | DIASTOLIC BLOOD PRESSURE: 60 MMHG | HEIGHT: 66 IN | WEIGHT: 203 LBS | TEMPERATURE: 98 F | SYSTOLIC BLOOD PRESSURE: 120 MMHG

## 2019-02-27 DIAGNOSIS — L30.9 DERMATITIS: ICD-10-CM

## 2019-02-27 DIAGNOSIS — D23.9 BENIGN NEOPLASM OF SKIN, UNSPECIFIED LOCATION: ICD-10-CM

## 2019-02-27 DIAGNOSIS — L82.1 SEBORRHEIC KERATOSES: ICD-10-CM

## 2019-02-27 DIAGNOSIS — H61.23 BILATERAL IMPACTED CERUMEN: Primary | ICD-10-CM

## 2019-02-27 DIAGNOSIS — Z85.820 PERSONAL HISTORY OF MALIGNANT MELANOMA OF SKIN: Primary | ICD-10-CM

## 2019-02-27 DIAGNOSIS — D22.9 MULTIPLE NEVI: ICD-10-CM

## 2019-02-27 PROCEDURE — 99213 OFFICE O/P EST LOW 20 MIN: CPT | Performed by: DERMATOLOGY

## 2019-02-27 PROCEDURE — G0463 HOSPITAL OUTPT CLINIC VISIT: HCPCS | Performed by: DERMATOLOGY

## 2019-02-27 PROCEDURE — 69210 REMOVE IMPACTED EAR WAX UNI: CPT | Performed by: OTOLARYNGOLOGY

## 2019-03-11 NOTE — PROGRESS NOTES
Judy Lopez is a 68year old male. HPI:     CC:  Patient presents with:  Rash: LOV 2/9/17. Pt presenting with red circular scaly rash to bilateral legs, neck and chest. Pt notes started approximatly 2 months prior.  Pt presviously used mometasone Rfl: 1   benzonatate 200 MG Oral Cap Take 1 capsule (200 mg total) by mouth 3 (three) times daily as needed for cough.  Disp: 30 capsule Rfl: 0   SERTRALINE  MG Oral Tab TAKE 1 TABLET BY MOUTH EVERY MORNING Disp: 90 tablet Rfl: 0   finasteride 5 MG O catheterization and had drug-eluting stents placed in the RCA ×1 and LAD ×2   • COLONOSCOPY     • COLONOSCOPY, POSSIBLE BIOPSY, POSSIBLE POLYPECTOMY [address]779 N/A 8/31/2017    Performed by Myke Lopez MD at [place] [address] Topics      Concerns:         Service: Not Asked        Blood Transfusions: Not Asked        Caffeine Concern: Yes          coffee, >6 cups daily        Occupational Exposure: Not Asked        Hobby Hazards: Not Asked        Sleep Concern: Not Aske different. Patient presents with concerns above. Patient has been in their usual state of health. History, medications, allergies reviewed as noted. ROS:  Denies any other systemic complaints. No new or changeing lesions other than noted above. etiology including CTCL, fixed drug reactions, lichenoid dermatitis, other auto inflammatory condition. Need for follow-up stressed with patient. Pathophysiology discussed with patient. Therapeutic options reviewed. See  Medications in grid.   Instructi

## 2019-05-03 DIAGNOSIS — F43.10 PTSD (POST-TRAUMATIC STRESS DISORDER): ICD-10-CM

## 2019-05-03 DIAGNOSIS — I10 ESSENTIAL HYPERTENSION: ICD-10-CM

## 2019-05-03 RX ORDER — SERTRALINE HYDROCHLORIDE 100 MG/1
TABLET, FILM COATED ORAL
Qty: 90 TABLET | Refills: 1 | Status: SHIPPED | OUTPATIENT
Start: 2019-05-03 | End: 2019-08-31

## 2019-05-03 NOTE — TELEPHONE ENCOUNTER
Refill passed per [facility], [facility] protocol.   Refill Protocol Appointment Criteria  · Appointment scheduled in the past 6 months or in the next 3 months  Recent Outpatient Visits            2 months ago [facility]    [facility]

## 2019-05-07 ENCOUNTER — OFFICE VISIT (OUTPATIENT)
Dept: FAMILY MEDICINE CLINIC | Facility: CLINIC | Age: 69
End: 2019-05-07
Payer: MEDICARE

## 2019-05-07 VITALS
DIASTOLIC BLOOD PRESSURE: 73 MMHG | SYSTOLIC BLOOD PRESSURE: 127 MMHG | BODY MASS INDEX: 31.98 KG/M2 | HEART RATE: 63 BPM | WEIGHT: 199 LBS | HEIGHT: 66 IN

## 2019-05-07 DIAGNOSIS — L30.0 NUMMULAR ECZEMA: ICD-10-CM

## 2019-05-07 DIAGNOSIS — F43.10 PTSD (POST-TRAUMATIC STRESS DISORDER): ICD-10-CM

## 2019-05-07 DIAGNOSIS — Z85.820 HX OF MELANOMA OF SKIN: ICD-10-CM

## 2019-05-07 DIAGNOSIS — I10 ESSENTIAL HYPERTENSION: ICD-10-CM

## 2019-05-07 DIAGNOSIS — Z00.00 ENCOUNTER FOR ANNUAL HEALTH EXAMINATION: ICD-10-CM

## 2019-05-07 DIAGNOSIS — Z00.00 MEDICARE ANNUAL WELLNESS VISIT, SUBSEQUENT: Primary | ICD-10-CM

## 2019-05-07 DIAGNOSIS — D12.6 TUBULAR ADENOMA OF COLON: ICD-10-CM

## 2019-05-07 DIAGNOSIS — I25.10 CORONARY ARTERY DISEASE INVOLVING NATIVE CORONARY ARTERY OF NATIVE HEART WITHOUT ANGINA PECTORIS: ICD-10-CM

## 2019-05-07 DIAGNOSIS — E78.2 MIXED HYPERLIPIDEMIA: ICD-10-CM

## 2019-05-07 PROCEDURE — G0439 PPPS, SUBSEQ VISIT: HCPCS | Performed by: FAMILY MEDICINE

## 2019-05-07 PROCEDURE — 99213 OFFICE O/P EST LOW 20 MIN: CPT | Performed by: FAMILY MEDICINE

## 2019-05-07 PROCEDURE — G0463 HOSPITAL OUTPT CLINIC VISIT: HCPCS | Performed by: FAMILY MEDICINE

## 2019-05-07 NOTE — PATIENT INSTRUCTIONS
Fall Prevention  Falls often occur due to slipping, tripping or losing your balance. Millions of people fall every year and injure themselves. Here are ways to reduce your risk of falling again.   · Think about your fall, was there anything that caused yo · Use night lights. · Go over all your medicines with a pharmacist or other healthcare provider to see if any of them could make you more likely to fall. Date Last Reviewed: 4/1/2018  © 4070-5527 [employer].  Alter Wall [address] [facility] EKG - covered if needed at Welcome to Medicare, and non-screening if indicated for medical reasons    Electrocardiogram date08/29/2016 Routine EKG is not a screening covered service except at the Welcome to Medicare Visit    Abdominal aortic aneurysm scre Covered Once after 65 No orders found for this or any previous visit. Please get once after your 65th birthday    Hepatitis B for Moderate/High Risk No orders found for this or any previous visit.  Medium/high risk factors:   End-stage renal disease   Hemop Diabetes Screening      HbgA1C     At Least  Annually for Diabetics No results found for: A1C No flowsheet data found.     Fasting Blood Sugar (FSB)   Patient must be diagnosed with one of the following:   • Hypertension   • Dyslipidemia   • Obesity (BMI ³3 Fecal Occult Blood   Covered Annually No results found for: FOB, OCCULTSTOOL No flowsheet data found.      Barium Enema-   uncomfortable but covered  Covered but uncomfortable   Glaucoma Screening      Ophthalmology Visit   Covered annually for Diabetics, Recommended Websites for Advanced Directives    [employer].no. org/publications/[email or web address]/personal_dec. pdf  An information packet, including necessary form from the [employer] website. http://www. [place].state. [place]/public/books/advin

## 2019-05-07 NOTE — PROGRESS NOTES
HPI:   Robel Presley is a 68year old male who presents for a Medicare Subsequent Annual Wellness visit (Pt already had Initial Annual Wellness).     Following up for posttraumatic stress disorder and coronary artery disease with hypertension and hy for Health Care on file in [address] [place] [address] Rd.    Advance care planning including the explanation and discussion of advance directives standard forms performed Face to Face with patient and Family/surrogate (if present), and forms available to patient in AVS         He s 272 09/27/2018        ALLERGIES:   He has No Known Allergies.     CURRENT MEDICATIONS:     Outpatient Medications Marked as Taking for the 5/7/19 encounter (Office Visit) with Karey Galeazzi, DO:  SERTRALINE  MG Oral Tab TAKE 1 TABLET BY MOUTH AURELIO colonoscopy (N/A, 8/31/2017). His family history includes Heart Disease in his father and mother; Hypertension in his father and mother. SOCIAL HISTORY:   He  reports that he has quit smoking. His smoking use included cigarettes.  He smoked 0.00 packs sentence and need to ask people to repeat themselves:  No   I especially have trouble understanding the speech of women and children:  No I have trouble understanding the speaker in a large room such as at a meeting or place of Religion:  No   Many people I supraclavicular, and axillary nodes normal   Neurologic: Normal            Vaccination History     Immunization History   Administered Date(s) Administered   • FLU VACC High Dose 65 YRS & Older PRSV Free (46247) 12/21/2016   • Fluvirin, 3 Years & >, Im 10/ Date Value   08/14/2018 119 (H)   10/23/2006 98     GLUCOSE (P) (mg/dL)   Date Value   08/26/2016 128 (H)       Cardiovascular Disease Screening     LDL Annually LDL Cholesterol (mg/dL)   Date Value   09/27/2018 75   08/25/2015 123 (H)        EKG - w/ In your pharmacy  prescription benefits      SPECIFIC DISEASE MONITORING Internal Lab or Procedure External Lab or Procedure      Annual Monitoring of Persistent     Medications (ACE/ARB, digoxin diuretics, anticonvulsants.)    Potassium  Annually Potassium (

## 2019-05-20 ENCOUNTER — APPOINTMENT (OUTPATIENT)
Dept: LAB | Age: 69
End: 2019-05-20
Attending: FAMILY MEDICINE
Payer: MEDICARE

## 2019-05-20 DIAGNOSIS — E78.2 MIXED HYPERLIPIDEMIA: ICD-10-CM

## 2019-05-20 DIAGNOSIS — I10 ESSENTIAL HYPERTENSION: ICD-10-CM

## 2019-05-20 PROCEDURE — 84443 ASSAY THYROID STIM HORMONE: CPT

## 2019-05-20 PROCEDURE — 84460 ALANINE AMINO (ALT) (SGPT): CPT

## 2019-05-20 PROCEDURE — 80061 LIPID PANEL: CPT

## 2019-05-20 PROCEDURE — 36415 COLL VENOUS BLD VENIPUNCTURE: CPT

## 2019-05-20 PROCEDURE — 80048 BASIC METABOLIC PNL TOTAL CA: CPT

## 2019-05-20 PROCEDURE — 84450 TRANSFERASE (AST) (SGOT): CPT

## 2019-08-11 NOTE — TELEPHONE ENCOUNTER
Review pended refill request as it does not fall under a protocol.     Last Rx: 8/22/18 for #90 + 3  LOV: 5/07/19    Requested Prescriptions     Pending Prescriptions Disp Refills   • FINASTERIDE 5 MG Oral Tab [Pharmacy Med Name: FINASTERIDE 5MG TABLETS] 90

## 2019-08-12 RX ORDER — FINASTERIDE 5 MG/1
TABLET, FILM COATED ORAL
Qty: 90 TABLET | Refills: 1 | Status: SHIPPED | OUTPATIENT
Start: 2019-08-12 | End: 2019-11-06

## 2019-08-24 ENCOUNTER — HOSPITAL ENCOUNTER (EMERGENCY)
Facility: HOSPITAL | Age: 69
Discharge: HOME | End: 2019-08-24
Attending: EMERGENCY MEDICINE
Payer: MEDICARE

## 2019-08-24 ENCOUNTER — APPOINTMENT (OUTPATIENT)
Dept: MRI IMAGING | Facility: HOSPITAL | Age: 69
End: 2019-08-24
Attending: EMERGENCY MEDICINE
Payer: MEDICARE

## 2019-08-24 ENCOUNTER — NURSE TRIAGE (OUTPATIENT)
Dept: OTHER | Age: 69
End: 2019-08-24

## 2019-08-24 VITALS
BODY MASS INDEX: 32.14 KG/M2 | SYSTOLIC BLOOD PRESSURE: 150 MMHG | HEIGHT: 66 IN | RESPIRATION RATE: 17 BRPM | OXYGEN SATURATION: 98 % | HEART RATE: 52 BPM | WEIGHT: 200 LBS | TEMPERATURE: 98 F | DIASTOLIC BLOOD PRESSURE: 73 MMHG

## 2019-08-24 DIAGNOSIS — R42 DIZZINESS: Primary | ICD-10-CM

## 2019-08-24 DIAGNOSIS — H61.23 BILATERAL IMPACTED CERUMEN: ICD-10-CM

## 2019-08-24 LAB
ANION GAP SERPL CALC-SCNC: 7 MMOL/L (ref 0–18)
BASOPHILS # BLD AUTO: 0.03 X10(3) UL (ref 0–0.2)
BASOPHILS NFR BLD AUTO: 0.5 %
BILIRUB UR QL: NEGATIVE
BUN BLD-MCNC: 16 MG/DL (ref 7–18)
BUN/CREAT SERPL: 16.8 (ref 10–20)
CALCIUM BLD-MCNC: 9.6 MG/DL (ref 8.5–10.1)
CHLORIDE SERPL-SCNC: 103 MMOL/L (ref 98–112)
CLARITY UR: CLEAR
CO2 SERPL-SCNC: 32 MMOL/L (ref 21–32)
COLOR UR: YELLOW
CREAT BLD-MCNC: 0.95 MG/DL (ref 0.7–1.3)
DEPRECATED RDW RBC AUTO: 47.5 FL (ref 35.1–46.3)
EOSINOPHIL # BLD AUTO: 0.06 X10(3) UL (ref 0–0.7)
EOSINOPHIL NFR BLD AUTO: 0.9 %
ERYTHROCYTE [DISTWIDTH] IN BLOOD BY AUTOMATED COUNT: 13.2 % (ref 11–15)
GLUCOSE BLD-MCNC: 92 MG/DL (ref 70–99)
GLUCOSE UR-MCNC: NEGATIVE MG/DL
HCT VFR BLD AUTO: 44.5 % (ref 39–53)
HGB BLD-MCNC: 14.6 G/DL (ref 13–17.5)
HGB UR QL STRIP.AUTO: NEGATIVE
IMM GRANULOCYTES # BLD AUTO: 0.01 X10(3) UL (ref 0–1)
IMM GRANULOCYTES NFR BLD: 0.2 %
KETONES UR-MCNC: NEGATIVE MG/DL
LEUKOCYTE ESTERASE UR QL STRIP.AUTO: NEGATIVE
LYMPHOCYTES # BLD AUTO: 1.67 X10(3) UL (ref 1–4)
LYMPHOCYTES NFR BLD AUTO: 25.3 %
MCH RBC QN AUTO: 31.9 PG (ref 26–34)
MCHC RBC AUTO-ENTMCNC: 32.8 G/DL (ref 31–37)
MCV RBC AUTO: 97.2 FL (ref 80–100)
MONOCYTES # BLD AUTO: 0.62 X10(3) UL (ref 0.1–1)
MONOCYTES NFR BLD AUTO: 9.4 %
NEUTROPHILS # BLD AUTO: 4.21 X10 (3) UL (ref 1.5–7.7)
NEUTROPHILS # BLD AUTO: 4.21 X10(3) UL (ref 1.5–7.7)
NEUTROPHILS NFR BLD AUTO: 63.7 %
NITRITE UR QL STRIP.AUTO: NEGATIVE
OSMOLALITY SERPL CALC.SUM OF ELEC: 295 MOSM/KG (ref 275–295)
PH UR: 7 [PH] (ref 5–8)
PLATELET # BLD AUTO: 231 10(3)UL (ref 150–450)
POTASSIUM SERPL-SCNC: 4.2 MMOL/L (ref 3.5–5.1)
PROT UR-MCNC: NEGATIVE MG/DL
RBC # BLD AUTO: 4.58 X10(6)UL (ref 3.8–5.8)
SODIUM SERPL-SCNC: 142 MMOL/L (ref 136–145)
SP GR UR STRIP: 1.01 (ref 1–1.03)
UROBILINOGEN UR STRIP-ACNC: <2
VIT C UR-MCNC: NEGATIVE MG/DL
WBC # BLD AUTO: 6.6 X10(3) UL (ref 4–11)

## 2019-08-24 PROCEDURE — 81003 URINALYSIS AUTO W/O SCOPE: CPT | Performed by: EMERGENCY MEDICINE

## 2019-08-24 PROCEDURE — 80048 BASIC METABOLIC PNL TOTAL CA: CPT

## 2019-08-24 PROCEDURE — 99284 EMERGENCY DEPT VISIT MOD MDM: CPT

## 2019-08-24 PROCEDURE — 85025 COMPLETE CBC W/AUTO DIFF WBC: CPT | Performed by: EMERGENCY MEDICINE

## 2019-08-24 PROCEDURE — 85025 COMPLETE CBC W/AUTO DIFF WBC: CPT

## 2019-08-24 PROCEDURE — 36415 COLL VENOUS BLD VENIPUNCTURE: CPT

## 2019-08-24 PROCEDURE — 93010 ELECTROCARDIOGRAM REPORT: CPT | Performed by: EMERGENCY MEDICINE

## 2019-08-24 PROCEDURE — 81003 URINALYSIS AUTO W/O SCOPE: CPT

## 2019-08-24 PROCEDURE — 93005 ELECTROCARDIOGRAM TRACING: CPT

## 2019-08-24 PROCEDURE — 69210 REMOVE IMPACTED EAR WAX UNI: CPT

## 2019-08-24 PROCEDURE — 80048 BASIC METABOLIC PNL TOTAL CA: CPT | Performed by: EMERGENCY MEDICINE

## 2019-08-24 PROCEDURE — 70551 MRI BRAIN STEM W/O DYE: CPT | Performed by: EMERGENCY MEDICINE

## 2019-08-24 NOTE — ED INITIAL ASSESSMENT (HPI)
Patient states when he woke up this am he felt like his equilibrium was off. Denies \"room spinning\" feeling. Patient denies any symptoms while lying in cart. Patient has steady gait on assessment.      Denies blurry vision/chestpain/sob

## 2019-08-24 NOTE — TELEPHONE ENCOUNTER
Please reply to pool: EM RN TRIAGE    Action Requested: Summary for Provider     []  Critical Lab, Recommendations Needed  [] Need Additional Advice  [x]   FYI    []   Need Orders  [] Need Medications Sent to Pharmacy  []  Other     SUMMARY: Advised ER and

## 2019-08-24 NOTE — ED PROVIDER NOTES
Patient Seen in: [facility] AND [facility] Emergency Department    History   Patient presents with:  Dizziness (neurologic)    Stated Complaint: dizziness upon standing up    HPI    75-year-old male presents for complaint of dizziness.   Patient states that he ha Negative. Eyes: Negative. Respiratory: Negative. Cardiovascular: Negative. Gastrointestinal: Negative. Genitourinary: Negative. Musculoskeletal: Negative. Skin: Negative. Neurological: Positive for dizziness.    All other systems rev Skin: Skin is warm, dry and intact. Psychiatric: He has a normal mood and affect. His speech is normal and behavior is normal.   Nursing note and vitals reviewed.             ED Course     Labs Reviewed   CBC W/ DIFFERENTIAL - Abnormal; Notable for the COMPARISON: [facility], CT BRAIN OR HEAD (CPT=70450), 6/27/2017, 16:48. INDICATIONS: Dizziness upon standing up, started this morning.   TECHNIQUE: Limited ER protocol imaging of the brain was performed utilizing diffusion weighted, T2-weig at 14:00              Clinical impression as well as any lab results and radiology findings were discussed with the patient. Patient is advised to follow up with PCP for reevaluation. Return precautions were given.  Patient voices understanding and agreemen

## 2019-08-26 NOTE — TELEPHONE ENCOUNTER
CSS--please call patient to schedule ER f/u appt    Disposition and Plan      Clinical Impression:  Dizziness  (primary encounter diagnosis)  Bilateral impacted cerumen     Disposition:  Discharge  8/24/2019  2:46 pm     Follow-up:  César Moore DO

## 2019-08-26 NOTE — TELEPHONE ENCOUNTER
Per appt list, had ENT visit with Dr. Jl Boudreaux today but canceled. Pt has a Cardiology appt on 11/8/19 with Dr. Bernarda Holcomb. I will send pt a Office Center message regarding making a f/u with Dr. Jenna Davis.

## 2019-08-27 NOTE — TELEPHONE ENCOUNTER
Noted pt has read the [employer]. No further action is needed. Thanks    Sourav Bauman,   We hope you are doing well after your visit to the [facility] AND [facility] Emergency Department.  Their advice was that you follow up with Dr. Jas Gilliam in the office within

## 2019-08-31 DIAGNOSIS — F43.10 PTSD (POST-TRAUMATIC STRESS DISORDER): ICD-10-CM

## 2019-08-31 DIAGNOSIS — I10 ESSENTIAL HYPERTENSION: ICD-10-CM

## 2019-09-01 RX ORDER — SERTRALINE HYDROCHLORIDE 100 MG/1
100 TABLET, FILM COATED ORAL DAILY
Qty: 90 TABLET | Refills: 1 | Status: SHIPPED | OUTPATIENT
Start: 2019-09-01 | End: 2020-05-11

## 2019-09-01 NOTE — TELEPHONE ENCOUNTER
Refill Protocol Appointment Criteria  · Appointment scheduled in the past 6 months or in the next 3 months  Recent Outpatient Visits            3 months ago Medicare annual wellness visit, subsequent    [address]

## 2019-10-02 ENCOUNTER — NURSE ONLY (OUTPATIENT)
Dept: FAMILY MEDICINE CLINIC | Facility: CLINIC | Age: 69
End: 2019-10-02
Payer: MEDICARE

## 2019-10-02 DIAGNOSIS — Z23 ENCOUNTER FOR ADMINISTRATION OF VACCINE: Primary | ICD-10-CM

## 2019-10-02 PROCEDURE — 90662 IIV NO PRSV INCREASED AG IM: CPT | Performed by: FAMILY MEDICINE

## 2019-10-02 PROCEDURE — G0009 ADMIN PNEUMOCOCCAL VACCINE: HCPCS | Performed by: FAMILY MEDICINE

## 2019-10-02 PROCEDURE — 90732 PPSV23 VACC 2 YRS+ SUBQ/IM: CPT | Performed by: FAMILY MEDICINE

## 2019-10-02 PROCEDURE — G0008 ADMIN INFLUENZA VIRUS VAC: HCPCS | Performed by: FAMILY MEDICINE

## 2019-10-02 NOTE — PROGRESS NOTES
Pt in office today for influenza and second pneumonia vaccine. Pt tolerated well. Pt given IVS to take home. No additional questions at this time.

## 2019-11-06 RX ORDER — FINASTERIDE 5 MG/1
TABLET, FILM COATED ORAL
Qty: 90 TABLET | Refills: 0 | Status: SHIPPED | OUTPATIENT
Start: 2019-11-06 | End: 2020-03-23

## 2019-11-13 ENCOUNTER — LAB ENCOUNTER (OUTPATIENT)
Dept: LAB | Age: 69
End: 2019-11-13
Attending: INTERNAL MEDICINE
Payer: MEDICARE

## 2019-11-13 DIAGNOSIS — E78.2 MIXED HYPERLIPIDEMIA: ICD-10-CM

## 2019-11-13 DIAGNOSIS — I25.10 CORONARY ARTERY DISEASE INVOLVING NATIVE CORONARY ARTERY OF NATIVE HEART WITHOUT ANGINA PECTORIS: ICD-10-CM

## 2019-11-13 PROCEDURE — 80076 HEPATIC FUNCTION PANEL: CPT

## 2019-11-13 PROCEDURE — 36415 COLL VENOUS BLD VENIPUNCTURE: CPT

## 2019-11-13 PROCEDURE — 80048 BASIC METABOLIC PNL TOTAL CA: CPT

## 2019-11-13 PROCEDURE — 80061 LIPID PANEL: CPT

## 2019-11-18 ENCOUNTER — OFFICE VISIT (OUTPATIENT)
Dept: FAMILY MEDICINE CLINIC | Facility: CLINIC | Age: 69
End: 2019-11-18
Payer: MEDICARE

## 2019-11-18 ENCOUNTER — TELEPHONE (OUTPATIENT)
Dept: FAMILY MEDICINE CLINIC | Facility: CLINIC | Age: 69
End: 2019-11-18

## 2019-11-18 ENCOUNTER — NURSE TRIAGE (OUTPATIENT)
Dept: FAMILY MEDICINE CLINIC | Facility: CLINIC | Age: 69
End: 2019-11-18

## 2019-11-18 VITALS
WEIGHT: 209 LBS | RESPIRATION RATE: 22 BRPM | BODY MASS INDEX: 33.59 KG/M2 | HEIGHT: 66 IN | TEMPERATURE: 98 F | SYSTOLIC BLOOD PRESSURE: 146 MMHG | DIASTOLIC BLOOD PRESSURE: 75 MMHG | HEART RATE: 60 BPM

## 2019-11-18 DIAGNOSIS — R31.0 GROSS HEMATURIA: Primary | ICD-10-CM

## 2019-11-18 DIAGNOSIS — N30.01 ACUTE CYSTITIS WITH HEMATURIA: ICD-10-CM

## 2019-11-18 PROCEDURE — 99213 OFFICE O/P EST LOW 20 MIN: CPT | Performed by: FAMILY MEDICINE

## 2019-11-18 PROCEDURE — G0463 HOSPITAL OUTPT CLINIC VISIT: HCPCS | Performed by: FAMILY MEDICINE

## 2019-11-18 PROCEDURE — 81003 URINALYSIS AUTO W/O SCOPE: CPT | Performed by: FAMILY MEDICINE

## 2019-11-18 RX ORDER — CIPROFLOXACIN 500 MG/1
500 TABLET, FILM COATED ORAL 2 TIMES DAILY
Qty: 20 TABLET | Refills: 0 | Status: SHIPPED | OUTPATIENT
Start: 2019-11-18 | End: 2020-03-23

## 2019-11-18 NOTE — TELEPHONE ENCOUNTER
Called patient and advised him to come in per Dr Jennifer Orr instructions. Patient verbalized understanding and agreed to come in.

## 2019-11-18 NOTE — PROGRESS NOTES
Pt with bladder pain for 2 weeks  Then had blood this am and clots today. No fever    Exam  Ht rrr no m  Lungs clear  Ext no edema    ua reviewed      A/p  1.  Gross hematuria  cipro  - URINALYSIS, ROUTINE; Future  - URINALYSIS, AUTO, W/O SCOPE  - URINE CU

## 2019-11-18 NOTE — TELEPHONE ENCOUNTER
Per wife of patient, patient is having blood in his urine and even if he's not having urine. Transfer to triage.

## 2019-12-03 ENCOUNTER — HOSPITAL ENCOUNTER (OUTPATIENT)
Dept: GENERAL RADIOLOGY | Age: 69
Discharge: HOME | End: 2019-12-03
Attending: FAMILY MEDICINE | Admitting: FAMILY MEDICINE
Payer: MEDICARE

## 2019-12-03 ENCOUNTER — LAB ENCOUNTER (OUTPATIENT)
Dept: LAB | Age: 69
End: 2019-12-03
Attending: FAMILY MEDICINE
Payer: MEDICARE

## 2019-12-03 ENCOUNTER — TELEPHONE (OUTPATIENT)
Dept: FAMILY MEDICINE CLINIC | Facility: CLINIC | Age: 69
End: 2019-12-03

## 2019-12-03 ENCOUNTER — OFFICE VISIT (OUTPATIENT)
Dept: FAMILY MEDICINE CLINIC | Facility: CLINIC | Age: 69
End: 2019-12-03
Payer: MEDICARE

## 2019-12-03 ENCOUNTER — PATIENT MESSAGE (OUTPATIENT)
Dept: FAMILY MEDICINE CLINIC | Facility: CLINIC | Age: 69
End: 2019-12-03

## 2019-12-03 ENCOUNTER — NURSE TRIAGE (OUTPATIENT)
Dept: OTHER | Age: 69
End: 2019-12-03

## 2019-12-03 VITALS
TEMPERATURE: 98 F | BODY MASS INDEX: 32.47 KG/M2 | HEART RATE: 88 BPM | SYSTOLIC BLOOD PRESSURE: 140 MMHG | HEIGHT: 66 IN | DIASTOLIC BLOOD PRESSURE: 81 MMHG | WEIGHT: 202 LBS

## 2019-12-03 DIAGNOSIS — C18.7 MALIGNANT NEOPLASM OF SIGMOID COLON (HCC): ICD-10-CM

## 2019-12-03 DIAGNOSIS — M79.671 RIGHT FOOT PAIN: ICD-10-CM

## 2019-12-03 DIAGNOSIS — Z85.820 HX OF MELANOMA OF SKIN: Primary | ICD-10-CM

## 2019-12-03 DIAGNOSIS — N30.01 ACUTE CYSTITIS WITH HEMATURIA: ICD-10-CM

## 2019-12-03 DIAGNOSIS — M79.671 RIGHT FOOT PAIN: Primary | ICD-10-CM

## 2019-12-03 DIAGNOSIS — R31.0 GROSS HEMATURIA: ICD-10-CM

## 2019-12-03 DIAGNOSIS — Z12.5 PROSTATE CANCER SCREENING: ICD-10-CM

## 2019-12-03 DIAGNOSIS — I10 ESSENTIAL HYPERTENSION: ICD-10-CM

## 2019-12-03 PROCEDURE — 85025 COMPLETE CBC W/AUTO DIFF WBC: CPT

## 2019-12-03 PROCEDURE — G0463 HOSPITAL OUTPT CLINIC VISIT: HCPCS | Performed by: FAMILY MEDICINE

## 2019-12-03 PROCEDURE — 36415 COLL VENOUS BLD VENIPUNCTURE: CPT

## 2019-12-03 PROCEDURE — 99213 OFFICE O/P EST LOW 20 MIN: CPT | Performed by: FAMILY MEDICINE

## 2019-12-03 PROCEDURE — 84550 ASSAY OF BLOOD/URIC ACID: CPT

## 2019-12-03 PROCEDURE — 81003 URINALYSIS AUTO W/O SCOPE: CPT

## 2019-12-03 PROCEDURE — 73630 X-RAY EXAM OF FOOT: CPT | Performed by: FAMILY MEDICINE

## 2019-12-03 RX ORDER — COLCHICINE 0.6 MG/1
0.6 TABLET ORAL 4 TIMES DAILY PRN
Qty: 30 TABLET | Refills: 0 | Status: SHIPPED | OUTPATIENT
Start: 2019-12-03 | End: 2019-12-03

## 2019-12-03 RX ORDER — PREDNISONE 10 MG/1
10 TABLET ORAL
Qty: 12 TABLET | Refills: 0 | Status: SHIPPED | OUTPATIENT
Start: 2019-12-03 | End: 2020-02-13

## 2019-12-03 NOTE — PATIENT INSTRUCTIONS
Gout Diet  Gout is a painful condition caused by an excess of uric acid, a waste product made by the body. Uric acid forms crystals that collect in the joints. The immune response to these crystals brings on symptoms of joint pain and swelling.  This is c · Complex carbohydrate foods, including whole grains, brown rice, oats, and beans  · Coffee, in moderation  · Water, approximately 64 ounces per day  Follow-up care  Follow up with your healthcare provider as advised.   When to seek medical advice  Call you · Certain meats (red meat, processed meat, wade, turkey, wild game, and goose)  · Sauces and gravies made with meat  · Organ meats (such as liver, kidneys, sweetbreads, and tripe)  · Legumes (such as dried beans and peas)  · Mushrooms, spinach, asparagus, Gout is an inflammation of a joint due to a build-up of gout crystals in the joint fluid. This occurs when there is an excess of uric acid (a normal waste product) in the body.  Uric acid builds up in the body when the kidneys are unable to filter enough of · If pain medicines have been prescribed, take them exactly as directed.    Preventing attacks  · Minimize or avoid alcohol use. Excess alcohol intake can cause a gout attack. · Limit these foods and beverages:  ? Organ meats, such as kidneys and liver  ?

## 2019-12-03 NOTE — TELEPHONE ENCOUNTER
Result Notes for URIC ACID, SERUM     Notes recorded by Nicole Dean CMA on 12/3/2019 at 2:57 PM CST  Normal results released through [facility] with provider note.  ------    Notes recorded by Juliann Kruger DO on 12/3/2019 at 2:31 PM CST  No acute

## 2019-12-03 NOTE — TELEPHONE ENCOUNTER
From: Grisel Calixto  To: Don Howe DO  Sent: 12/3/2019 8:19 AM CST  Subject: Other    My  has done something to his big toe on right foot. It's painful and been like this for a few days. Today it hurts more.  I'd like a same day appt

## 2019-12-03 NOTE — TELEPHONE ENCOUNTER
Spouse calling states colchicine 0.6 MG not covered by insurance. Requesting an alternative.  Please advise

## 2019-12-03 NOTE — TELEPHONE ENCOUNTER
Pts wife stopped by [place] office stts she was spoke to nurse regarding medication not being covered Wife would like for an alternative to be sent today.

## 2019-12-03 NOTE — TELEPHONE ENCOUNTER
Spouse calling to report script not received by pharmacy. Order phoned into [facility] at this time, pharmacist confirmed never received. colchicine 0.6 MG Oral Tab 30 tablet 0 12/3/2019     Sig - Route:  Take 1 tablet (0.6 mg total) by mouth 4 (four) jimmy

## 2019-12-03 NOTE — TELEPHONE ENCOUNTER
----- Message from Rosaurajennifer Carrillo sent at 12/3/2019  8:19 AM CST -----  Regarding: Other  Contact: [phone]  My  has done something to his big toe on right foot. It's painful and been like this for a few days. Today it hurts more.  I'd like a

## 2019-12-03 NOTE — TELEPHONE ENCOUNTER
Tere from the lab calling to inform Dr. Jakob Lowe STAT labs are available for viewing. [facility] message sent to Dr. Jakob Lowe.

## 2019-12-03 NOTE — TELEPHONE ENCOUNTER
Action Requested: Summary for Provider     []  Critical Lab, Recommendations Needed  [] Need Additional Advice  []   FYI    []   Need Orders  [] Need Medications Sent to Pharmacy  []  Other     SUMMARY: Patient c/o right great toe is swollen,numb and all r

## 2019-12-03 NOTE — TELEPHONE ENCOUNTER
Spouse called in stating that medication below has not gotten to the pharmacy yet. She is asking for assistance. Please advise.        Current Outpatient Medications:   •  colchicine 0.6 MG Oral Tab, Take 1 tablet (0.6 mg total) by mouth 4 (four) ti

## 2019-12-03 NOTE — PROGRESS NOTES
Blood pressure 140/81, pulse 88, temperature 97.8 °F (36.6 °C), temperature source Oral, height 5' 6\" (1.676 m), weight 202 lb (91.6 kg). 6-day history of right great toe pain and swelling.   History of gout patient has not had any gouty attacks for yea

## 2020-01-29 ENCOUNTER — OFFICE VISIT (OUTPATIENT)
Dept: OTOLARYNGOLOGY | Facility: CLINIC | Age: 70
End: 2020-01-29
Payer: MEDICARE

## 2020-01-29 VITALS
DIASTOLIC BLOOD PRESSURE: 78 MMHG | SYSTOLIC BLOOD PRESSURE: 155 MMHG | HEIGHT: 68 IN | BODY MASS INDEX: 30.31 KG/M2 | WEIGHT: 200 LBS | TEMPERATURE: 98 F

## 2020-01-29 DIAGNOSIS — H66.002 ACUTE SUPPURATIVE OTITIS MEDIA OF LEFT EAR WITHOUT SPONTANEOUS RUPTURE OF TYMPANIC MEMBRANE, RECURRENCE NOT SPECIFIED: ICD-10-CM

## 2020-01-29 DIAGNOSIS — H61.23 BILATERAL IMPACTED CERUMEN: Primary | ICD-10-CM

## 2020-01-29 PROCEDURE — 69210 REMOVE IMPACTED EAR WAX UNI: CPT | Performed by: OTOLARYNGOLOGY

## 2020-01-29 PROCEDURE — G0463 HOSPITAL OUTPT CLINIC VISIT: HCPCS | Performed by: OTOLARYNGOLOGY

## 2020-01-29 PROCEDURE — 99213 OFFICE O/P EST LOW 20 MIN: CPT | Performed by: OTOLARYNGOLOGY

## 2020-01-29 RX ORDER — DEXAMETHASONE 6 MG/1
TABLET ORAL
Qty: 4 TABLET | Refills: 0 | Status: SHIPPED | OUTPATIENT
Start: 2020-01-29 | End: 2020-02-13

## 2020-01-29 RX ORDER — AMOXICILLIN 500 MG/1
500 CAPSULE ORAL 3 TIMES DAILY
Qty: 30 CAPSULE | Refills: 0 | Status: SHIPPED | OUTPATIENT
Start: 2020-01-29 | End: 2020-02-08

## 2020-01-29 RX ORDER — COLCHICINE 0.6 MG/1
TABLET ORAL
COMMUNITY
Start: 2019-12-03 | End: 2020-02-13

## 2020-01-29 NOTE — PROGRESS NOTES
Khloe Lepe is a 68year old male. Patient presents with:  Ear Wax: both ears      HISTORY OF PRESENT ILLNESS  1/29/2020  Patient presents with complaints  of hearing loss worse on the left than the right.   He actually has a little discomfort CATHERTERIZATION (PBP)  08/26/2016    cardiac catheterization and had drug-eluting stents placed in the RCA ×1 and LAD ×2   • COLONOSCOPY     • COLONOSCOPY, POSSIBLE BIOPSY, POSSIBLE POLYPECTOMY 72256 N/A 8/31/2017    Performed by Rohit Diaz MD at [facility]. Anterior cervical. Posterior cervical. Supraclavicular.     voice   normal   Nose/Mouth/Throat Normal External nose - Normal. Lips/teeth/gums - Normal.          After informed consent was obtained, the patients ears were examined under the op tablet (100 mg total) by mouth daily. , Disp: 90 tablet, Rfl: 3  •  cholecalciferol (KP VITAMIN D) 1000 UNITS Oral Cap, Take  by mouth., Disp: , Rfl:   •  Multiple Vitamin (MULTI-DAY) Oral Tab, Take  by mouth., Disp: , Rfl:   •  colchicine 0.6 MG Oral Tab,

## 2020-02-07 ENCOUNTER — OFFICE VISIT (OUTPATIENT)
Dept: OTOLARYNGOLOGY | Facility: CLINIC | Age: 70
End: 2020-02-07
Payer: MEDICARE

## 2020-02-07 VITALS
SYSTOLIC BLOOD PRESSURE: 145 MMHG | DIASTOLIC BLOOD PRESSURE: 81 MMHG | WEIGHT: 200 LBS | BODY MASS INDEX: 30.31 KG/M2 | HEIGHT: 68 IN | HEART RATE: 58 BPM

## 2020-02-07 DIAGNOSIS — H65.92 FLUID LEVEL BEHIND TYMPANIC MEMBRANE OF LEFT EAR: Primary | ICD-10-CM

## 2020-02-07 PROCEDURE — 69420 INCISION OF EARDRUM: CPT | Performed by: OTOLARYNGOLOGY

## 2020-02-07 PROCEDURE — G0463 HOSPITAL OUTPT CLINIC VISIT: HCPCS | Performed by: OTOLARYNGOLOGY

## 2020-02-07 PROCEDURE — 99213 OFFICE O/P EST LOW 20 MIN: CPT | Performed by: OTOLARYNGOLOGY

## 2020-02-07 NOTE — PROGRESS NOTES
Current Outpatient Medications:   •  colchicine 0.6 MG Oral Tab, , Disp: , Rfl:   •  predniSONE 10 MG Oral Tab, Take 1 tablet (10 mg total) by mouth 3 (three) times daily. , Disp: 12 tablet, Rfl: 0  •  ezetimibe (ZETIA) 10 MG Oral Tab, Take 1 tablet (10 m Reported on 2/7/2020 ), Disp: 20 tablet, Rfl: 0

## 2020-02-13 ENCOUNTER — TELEPHONE (OUTPATIENT)
Dept: FAMILY MEDICINE CLINIC | Facility: CLINIC | Age: 70
End: 2020-02-13

## 2020-02-13 DIAGNOSIS — M10.00 ACUTE IDIOPATHIC GOUT, UNSPECIFIED SITE: Primary | ICD-10-CM

## 2020-02-13 RX ORDER — COLCHICINE 0.6 MG/1
0.6 TABLET ORAL 4 TIMES DAILY
Qty: 60 TABLET | Refills: 0 | Status: SHIPPED | OUTPATIENT
Start: 2020-02-13 | End: 2020-05-07

## 2020-02-13 RX ORDER — PREDNISONE 10 MG/1
10 TABLET ORAL
Qty: 30 TABLET | Refills: 0 | Status: SHIPPED | OUTPATIENT
Start: 2020-02-13 | End: 2020-04-22

## 2020-02-13 NOTE — TELEPHONE ENCOUNTER
Wife states her 's right ankle/foot has been hurting for the past two days, making it very hard for him to walk on. Per wife, the patient was seen in December for same and stated it was \"gout. \"    Wife requesting \"something stronger than Tylenol\

## 2020-02-13 NOTE — TELEPHONE ENCOUNTER
Patient's wife states both medications worked in the past, but is requesting a longer dose of Prednisone (more than 4 days) as patient is in a lot of pain.

## 2020-02-13 NOTE — TELEPHONE ENCOUNTER
Received call from Pharmacist stated rx Colchicine is Not covered and the one that is covered the Out of pocket is too expensive for the Pt   Pt p/u prednisone   Please advise

## 2020-02-14 ENCOUNTER — TELEPHONE (OUTPATIENT)
Dept: FAMILY MEDICINE CLINIC | Facility: CLINIC | Age: 70
End: 2020-02-14

## 2020-02-14 NOTE — TELEPHONE ENCOUNTER
Wife stated Pt is beginning to have some relief now       Wife asking if pt can have a repeat uric acid test , last done 12/2019 7.1, she believes he needs to stay on medication to prevent Gout attacks

## 2020-02-14 NOTE — TELEPHONE ENCOUNTER
See condition update telephone encounter 2/13/2020  Min TALI attempted to contact Pt today, and she is waiting on call back from Pt. Duplicate message.

## 2020-02-14 NOTE — TELEPHONE ENCOUNTER
Pharmacy is calling because colchicine 0.6 MG Oral Tab is very expensive with patients insurance, the brand and generic name. They are looking for an alternative medication.

## 2020-02-19 ENCOUNTER — OFFICE VISIT (OUTPATIENT)
Dept: OTOLARYNGOLOGY | Facility: CLINIC | Age: 70
End: 2020-02-19
Payer: MEDICARE

## 2020-02-19 ENCOUNTER — OFFICE VISIT (OUTPATIENT)
Dept: AUDIOLOGY | Facility: CLINIC | Age: 70
End: 2020-02-19
Payer: MEDICARE

## 2020-02-19 VITALS
DIASTOLIC BLOOD PRESSURE: 73 MMHG | HEIGHT: 68 IN | TEMPERATURE: 99 F | BODY MASS INDEX: 30.31 KG/M2 | WEIGHT: 200 LBS | SYSTOLIC BLOOD PRESSURE: 128 MMHG

## 2020-02-19 DIAGNOSIS — H90.3 SENSORINEURAL HEARING LOSS (SNHL) OF BOTH EARS: ICD-10-CM

## 2020-02-19 DIAGNOSIS — H90.3 SENSORINEURAL HEARING LOSS, BILATERAL: Primary | ICD-10-CM

## 2020-02-19 DIAGNOSIS — H69.82 DYSFUNCTION OF LEFT EUSTACHIAN TUBE: Primary | ICD-10-CM

## 2020-02-19 PROCEDURE — 92557 COMPREHENSIVE HEARING TEST: CPT | Performed by: AUDIOLOGIST

## 2020-02-19 PROCEDURE — 99213 OFFICE O/P EST LOW 20 MIN: CPT | Performed by: OTOLARYNGOLOGY

## 2020-02-19 PROCEDURE — 92567 TYMPANOMETRY: CPT | Performed by: AUDIOLOGIST

## 2020-02-19 PROCEDURE — G0463 HOSPITAL OUTPT CLINIC VISIT: HCPCS | Performed by: OTOLARYNGOLOGY

## 2020-02-19 NOTE — PROGRESS NOTES
AUDIOGRAM     Cynthia Cruz was referred for testing by Chhaya Hong for decreased hearing in both ears  2/6/1950  [id number]      Patient wears binaural hearing aids obtained elsewhere.     Otoscopic inspection: right ear no cerumen; left ear no ce

## 2020-02-19 NOTE — PROGRESS NOTES
Rica Martin is a 65year old male. Patient presents with:   Follow - Up: 2 week follow up- left ear infections, per pt there is some changes/improvement of symptoms,      HISTORY OF PRESENT ILLNESS  1/29/2020  Patient presents with complaints Medical History:   Diagnosis Date   • Amputation, finger, traumatic     partial amputation of finger.  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    Head/Face Normal Facial features - Normal. Eyebrows - Normal. Skull - Normal.             Ears abNormal Inspection - Right: Normal, Left: Normal. Canal - Right: Normal, Left: Normal. TM - Right: nl, evening., Disp: 90 tablet, Rfl: 1  •  aspirin 81 MG Oral Tab, Take 1 tablet (81 mg total) by mouth daily. , Disp: 90 tablet, Rfl: 3  •  allopurinol 100 MG Oral Tab, Take 1 tablet (100 mg total) by mouth daily. , Disp: 90 tablet, Rfl: 3  •  cholecalciferol (K

## 2020-03-23 ENCOUNTER — TELEPHONE (OUTPATIENT)
Dept: FAMILY MEDICINE CLINIC | Facility: CLINIC | Age: 70
End: 2020-03-23

## 2020-03-23 RX ORDER — FINASTERIDE 5 MG/1
5 TABLET, FILM COATED ORAL
Qty: 90 TABLET | Refills: 3 | Status: SHIPPED | OUTPATIENT
Start: 2020-03-23 | End: 2021-03-01

## 2020-03-23 NOTE — TELEPHONE ENCOUNTER
Patient needs a refill on    FINASTERIDE 5 MG Oral Tab 90 tablet 0 11/6/2019     Sig: TAKE 1 TABLET BY MOUTH EVERY DAY        Thank you.

## 2020-03-31 ENCOUNTER — E-VISIT (OUTPATIENT)
Dept: FAMILY MEDICINE CLINIC | Facility: CLINIC | Age: 70
End: 2020-03-31

## 2020-03-31 DIAGNOSIS — R05.9 COUGH: Primary | ICD-10-CM

## 2020-03-31 RX ORDER — METHYLPREDNISOLONE 4 MG/1
TABLET ORAL
Qty: 1 KIT | Refills: 0 | Status: SHIPPED | OUTPATIENT
Start: 2020-03-31 | End: 2020-04-22

## 2020-03-31 RX ORDER — ALBUTEROL SULFATE 90 UG/1
2 AEROSOL, METERED RESPIRATORY (INHALATION) EVERY 6 HOURS PRN
Qty: 1 INHALER | Refills: 0 | Status: SHIPPED | OUTPATIENT
Start: 2020-03-31 | End: 2020-04-30

## 2020-03-31 NOTE — PROGRESS NOTES
Tyra Browne is a 65year old male. HPI:   See answers to questions above.  Spoke with patient   Mild cough but wheezing and SOB with activity   Using wife's inhaler and helps   No asthma hx   No Covid exposure   No fever     Current Outpatient Me Amputation, finger, traumatic     partial amputation of finger.  Multiple accidents with table saws   • Colon cancer ([facility] [address]    • High blood pressure    • History of blood clots    • Malignant melanoma of right upper extremity (HCC) Unknown    Excision      P

## 2020-04-19 ENCOUNTER — PATIENT MESSAGE (OUTPATIENT)
Dept: FAMILY MEDICINE CLINIC | Facility: CLINIC | Age: 70
End: 2020-04-19

## 2020-04-20 ENCOUNTER — HOSPITAL ENCOUNTER (EMERGENCY)
Facility: HOSPITAL | Age: 70
Discharge: HOME | End: 2020-04-20
Attending: EMERGENCY MEDICINE
Payer: MEDICARE

## 2020-04-20 ENCOUNTER — APPOINTMENT (OUTPATIENT)
Dept: GENERAL RADIOLOGY | Facility: HOSPITAL | Age: 70
End: 2020-04-20
Attending: EMERGENCY MEDICINE
Payer: MEDICARE

## 2020-04-20 ENCOUNTER — HOSPITAL ENCOUNTER (EMERGENCY)
Facility: HOSPITAL | Age: 70
Discharge: STILL ADMITTED OR ADMINISTRATIVE | End: 2020-04-20
Payer: COMMERCIAL

## 2020-04-20 ENCOUNTER — TELEPHONE (OUTPATIENT)
Dept: FAMILY MEDICINE CLINIC | Facility: CLINIC | Age: 70
End: 2020-04-20

## 2020-04-20 VITALS
OXYGEN SATURATION: 96 % | HEART RATE: 62 BPM | DIASTOLIC BLOOD PRESSURE: 89 MMHG | SYSTOLIC BLOOD PRESSURE: 149 MMHG | TEMPERATURE: 98 F | RESPIRATION RATE: 17 BRPM

## 2020-04-20 DIAGNOSIS — R07.89 CHEST HEAVINESS: Primary | ICD-10-CM

## 2020-04-20 PROCEDURE — 71045 X-RAY EXAM CHEST 1 VIEW: CPT | Performed by: EMERGENCY MEDICINE

## 2020-04-20 PROCEDURE — 84484 ASSAY OF TROPONIN QUANT: CPT | Performed by: EMERGENCY MEDICINE

## 2020-04-20 PROCEDURE — 85379 FIBRIN DEGRADATION QUANT: CPT | Performed by: EMERGENCY MEDICINE

## 2020-04-20 PROCEDURE — 93010 ELECTROCARDIOGRAM REPORT: CPT | Performed by: EMERGENCY MEDICINE

## 2020-04-20 PROCEDURE — 36415 COLL VENOUS BLD VENIPUNCTURE: CPT

## 2020-04-20 PROCEDURE — 85025 COMPLETE CBC W/AUTO DIFF WBC: CPT | Performed by: EMERGENCY MEDICINE

## 2020-04-20 PROCEDURE — 93005 ELECTROCARDIOGRAM TRACING: CPT

## 2020-04-20 PROCEDURE — 99284 EMERGENCY DEPT VISIT MOD MDM: CPT

## 2020-04-20 PROCEDURE — 80048 BASIC METABOLIC PNL TOTAL CA: CPT | Performed by: EMERGENCY MEDICINE

## 2020-04-20 NOTE — TELEPHONE ENCOUNTER
From: Aditi Mancera  To: Hayde Howe DO  Sent: 4/19/2020 11:17 AM CDT  Subject: Other    My , Leisa Pruitt, had an e-visit not long ago.  He had symptoms then of wheezing, blood pressure was normal, some coughing, shortness of breath, no feve

## 2020-04-20 NOTE — TELEPHONE ENCOUNTER
Spoke with wife who said pt is having increased SOB, chest tightness, and cough. He had an evisit on 3/31/20, but has continue to feel worse.  Informed her we are not seeing any pt's with respiratory symptoms in the office, and that he should be evaluated i

## 2020-04-20 NOTE — TELEPHONE ENCOUNTER
To: EROS REYES TRIAGE      From: Tasneem Weston      Created: 4/19/2020 11:17 AM          My , Chase Obrien, had an e-visit not long ago.  He had symptoms then of wheezing, blood pressure was normal, some coughing, shortness of breath, no fever, tightness

## 2020-04-20 NOTE — ED PROVIDER NOTES
Patient Seen in: [facility] Emergency Department      History   Patient presents with:  Chest Pain Angina    Stated Complaint: cough increased sob and chest tighntess    HPI    33-year-old male with history of hypertension, hyperlipidemia, coronar Smoking status: Former Smoker        Packs/day: 0.00        Types: Cigarettes      Smokeless tobacco: Never Used    Alcohol use:  Yes      Alcohol/week: 5.0 standard drinks      Types: 5 Cans of beer per week      Comment: beer, 4 beers weekly    Drug use -----------         ------                     CBC W/ DIFFERENTIAL[[id number]]                              Final result                 Please view results for these tests on the individual orders.    [address]

## 2020-05-08 ENCOUNTER — LAB ENCOUNTER (OUTPATIENT)
Dept: LAB | Age: 70
End: 2020-05-08
Attending: INTERNAL MEDICINE
Payer: MEDICARE

## 2020-05-08 DIAGNOSIS — Z12.5 PROSTATE CANCER SCREENING: ICD-10-CM

## 2020-05-08 DIAGNOSIS — I25.10 CORONARY ARTERY DISEASE INVOLVING NATIVE CORONARY ARTERY OF NATIVE HEART WITHOUT ANGINA PECTORIS: ICD-10-CM

## 2020-05-08 DIAGNOSIS — I25.118 CORONARY ARTERY DISEASE INVOLVING NATIVE CORONARY ARTERY OF NATIVE HEART WITH OTHER FORM OF ANGINA PECTORIS (HCC): ICD-10-CM

## 2020-05-08 DIAGNOSIS — I10 ESSENTIAL HYPERTENSION: ICD-10-CM

## 2020-05-08 DIAGNOSIS — Z01.812 PRE-PROCEDURE LAB EXAM: Primary | ICD-10-CM

## 2020-05-08 DIAGNOSIS — M10.00 ACUTE IDIOPATHIC GOUT, UNSPECIFIED SITE: ICD-10-CM

## 2020-05-08 DIAGNOSIS — Z01.810 PRE-OPERATIVE CARDIOVASCULAR EXAMINATION, HIGH RISK SURGERY: ICD-10-CM

## 2020-05-08 DIAGNOSIS — R07.9 CHEST PAIN, UNSPECIFIED TYPE: ICD-10-CM

## 2020-05-08 PROCEDURE — 36415 COLL VENOUS BLD VENIPUNCTURE: CPT

## 2020-05-08 PROCEDURE — 80053 COMPREHEN METABOLIC PANEL: CPT

## 2020-05-08 PROCEDURE — 84550 ASSAY OF BLOOD/URIC ACID: CPT

## 2020-05-08 PROCEDURE — 85025 COMPLETE CBC W/AUTO DIFF WBC: CPT

## 2020-05-08 PROCEDURE — 80061 LIPID PANEL: CPT

## 2020-05-11 ENCOUNTER — LAB ENCOUNTER (OUTPATIENT)
Dept: LAB | Facility: HOSPITAL | Age: 70
End: 2020-05-11
Attending: EMERGENCY MEDICINE
Payer: MEDICARE

## 2020-05-11 DIAGNOSIS — Z01.812 PRE-PROCEDURE LAB EXAM: ICD-10-CM

## 2020-05-11 DIAGNOSIS — F43.10 PTSD (POST-TRAUMATIC STRESS DISORDER): ICD-10-CM

## 2020-05-11 DIAGNOSIS — I10 ESSENTIAL HYPERTENSION: ICD-10-CM

## 2020-05-11 RX ORDER — SERTRALINE HYDROCHLORIDE 100 MG/1
TABLET, FILM COATED ORAL
Qty: 90 TABLET | Refills: 1 | Status: SHIPPED | OUTPATIENT
Start: 2020-05-11 | End: 2020-11-09

## 2020-05-11 NOTE — PROGRESS NOTES
Future Appointments  5/12/2020  8:30 AM    Valeri rodriguez MD    [facility]    Pre-op testing.

## 2020-05-12 ENCOUNTER — HOSPITAL ENCOUNTER (OUTPATIENT)
Dept: INTERVENTIONAL RADIOLOGY/VASCULAR | Facility: HOSPITAL | Age: 70
Discharge: HOME | End: 2020-05-12
Attending: INTERNAL MEDICINE | Admitting: INTERNAL MEDICINE
Payer: MEDICARE

## 2020-05-12 VITALS
RESPIRATION RATE: 21 BRPM | HEART RATE: 67 BPM | BODY MASS INDEX: 32 KG/M2 | WEIGHT: 210 LBS | SYSTOLIC BLOOD PRESSURE: 120 MMHG | OXYGEN SATURATION: 96 % | DIASTOLIC BLOOD PRESSURE: 80 MMHG

## 2020-05-12 DIAGNOSIS — I25.10 CAD (CORONARY ARTERY DISEASE): ICD-10-CM

## 2020-05-12 DIAGNOSIS — R07.9 CHEST PAIN: ICD-10-CM

## 2020-05-12 PROCEDURE — 93458 L HRT ARTERY/VENTRICLE ANGIO: CPT

## 2020-05-12 PROCEDURE — 4A023N7 MEASUREMENT OF CARDIAC SAMPLING AND PRESSURE, LEFT HEART, PERCUTANEOUS APPROACH: ICD-10-PCS | Performed by: INTERNAL MEDICINE

## 2020-05-12 PROCEDURE — 36415 COLL VENOUS BLD VENIPUNCTURE: CPT

## 2020-05-12 PROCEDURE — B2011ZZ PLAIN RADIOGRAPHY OF MULTIPLE CORONARY ARTERIES USING LOW OSMOLAR CONTRAST: ICD-10-PCS | Performed by: INTERNAL MEDICINE

## 2020-05-12 PROCEDURE — 99152 MOD SED SAME PHYS/QHP 5/>YRS: CPT

## 2020-05-12 PROCEDURE — B2051ZZ PLAIN RADIOGRAPHY OF LEFT HEART USING LOW OSMOLAR CONTRAST: ICD-10-PCS | Performed by: INTERNAL MEDICINE

## 2020-05-12 RX ORDER — LIDOCAINE HYDROCHLORIDE 20 MG/ML
INJECTION, SOLUTION EPIDURAL; INFILTRATION; INTRACAUDAL; PERINEURAL
Status: COMPLETED
Start: 2020-05-12 | End: 2020-05-12

## 2020-05-12 RX ORDER — SODIUM CHLORIDE 9 MG/ML
INJECTION, SOLUTION INTRAVENOUS
Status: COMPLETED | OUTPATIENT
Start: 2020-05-12 | End: 2020-05-12

## 2020-05-12 RX ORDER — HEPARIN SODIUM 1000 [USP'U]/ML
INJECTION, SOLUTION INTRAVENOUS; SUBCUTANEOUS
Status: DISCONTINUED
Start: 2020-05-12 | End: 2020-05-12 | Stop reason: WASHOUT

## 2020-05-12 RX ORDER — MIDAZOLAM HYDROCHLORIDE 1 MG/ML
INJECTION INTRAMUSCULAR; INTRAVENOUS
Status: COMPLETED
Start: 2020-05-12 | End: 2020-05-12

## 2020-05-12 RX ADMIN — SODIUM CHLORIDE: 9 INJECTION, SOLUTION INTRAVENOUS at 08:00:00

## 2020-05-12 NOTE — PROCEDURES
[facility] Cardiac Cath Procedure Note    Zen Krishnamurthy Patient Status:  Outpatient in a Bed    1950 MRN [id number]   Location [facility] Attending Carlos Tabor, [address] [facility] Day # 0 PCP Jeannie Pena.  DO Shyam 2 mg versed and 50 mcg fentanyl was given. Patient was assessed and monitoring of oxygen, heart rate and blood pressure by nurse and myself during the exam 22 minutes.       Radha Lundberg MD  05/12/20

## 2020-05-12 NOTE — PROGRESS NOTES
Discharge instructions reviewed with patient and spouse over the phone at this time. All questions and concerns addressed. All parties verbalized understanding of plan of care via teach back method.  At discharge vital signs were stable on room air, incisio

## 2020-05-12 NOTE — PROGRESS NOTES
Outpatient Surgery Brief Discharge Summary         Patient ID:  Zen Krishnamurthy  [id number]  65year old  2/6/1950    Discharge Diagnoses:angina/cad    Procedures: l,lv,cor    Discharged Condition: stable    Disposition: home    Patient Instructions:

## 2020-05-12 NOTE — INTERVAL H&P NOTE
Pre-op Diagnosis: * No pre-op diagnosis entered *    The above referenced H&P was reviewed by Iraj Ma MD on 5/12/2020, the patient was examined and no significant changes have occurred in the patient's condition since the H&P was performed.   I

## 2020-05-27 ENCOUNTER — PATIENT MESSAGE (OUTPATIENT)
Dept: FAMILY MEDICINE CLINIC | Facility: CLINIC | Age: 70
End: 2020-05-27

## 2020-05-27 ENCOUNTER — APPOINTMENT (OUTPATIENT)
Dept: LAB | Age: 70
End: 2020-05-27
Attending: FAMILY MEDICINE
Payer: MEDICARE

## 2020-05-27 DIAGNOSIS — I10 ESSENTIAL HYPERTENSION WITH GOAL BLOOD PRESSURE LESS THAN 140/90: ICD-10-CM

## 2020-05-27 PROCEDURE — 80048 BASIC METABOLIC PNL TOTAL CA: CPT

## 2020-05-27 PROCEDURE — 36415 COLL VENOUS BLD VENIPUNCTURE: CPT

## 2020-06-01 ENCOUNTER — TELEPHONE (OUTPATIENT)
Dept: FAMILY MEDICINE CLINIC | Facility: CLINIC | Age: 70
End: 2020-06-01

## 2020-06-01 DIAGNOSIS — R06.09 CHRONIC DYSPNEA: Primary | ICD-10-CM

## 2020-06-03 ENCOUNTER — TELEPHONE (OUTPATIENT)
Dept: PULMONOLOGY | Facility: CLINIC | Age: 70
End: 2020-06-03

## 2020-06-03 NOTE — TELEPHONE ENCOUNTER
Pt was referred by Dr. Uziel Chu for shortness of breath. Pt would like to see Dr. Luisa Walters or Dr. Sulaiman Colon sooner than first available. Please call.

## 2020-06-03 NOTE — TELEPHONE ENCOUNTER
She accepted appt for pt w/ Dr. Eusebia Rivera on 7/8 3:30 pm [facility] Appt info given. Litzy Hough voiced understanding.

## 2020-07-08 ENCOUNTER — OFFICE VISIT (OUTPATIENT)
Dept: PULMONOLOGY | Facility: CLINIC | Age: 70
End: 2020-07-08
Payer: MEDICARE

## 2020-07-08 VITALS
SYSTOLIC BLOOD PRESSURE: 149 MMHG | RESPIRATION RATE: 18 BRPM | HEIGHT: 67 IN | BODY MASS INDEX: 35.63 KG/M2 | WEIGHT: 227 LBS | OXYGEN SATURATION: 96 % | DIASTOLIC BLOOD PRESSURE: 77 MMHG | HEART RATE: 65 BPM

## 2020-07-08 DIAGNOSIS — Z87.891 FORMER SMOKER: ICD-10-CM

## 2020-07-08 DIAGNOSIS — R06.00 DYSPNEA, UNSPECIFIED TYPE: ICD-10-CM

## 2020-07-08 DIAGNOSIS — J44.9 CHRONIC OBSTRUCTIVE PULMONARY DISEASE, UNSPECIFIED COPD TYPE (HCC): ICD-10-CM

## 2020-07-08 DIAGNOSIS — G47.33 OSA (OBSTRUCTIVE SLEEP APNEA): Primary | ICD-10-CM

## 2020-07-08 PROCEDURE — G0463 HOSPITAL OUTPT CLINIC VISIT: HCPCS | Performed by: INTERNAL MEDICINE

## 2020-07-08 PROCEDURE — 99205 OFFICE O/P NEW HI 60 MIN: CPT | Performed by: INTERNAL MEDICINE

## 2020-07-08 PROCEDURE — 94761 N-INVAS EAR/PLS OXIMETRY MLT: CPT | Performed by: INTERNAL MEDICINE

## 2020-07-08 NOTE — PROGRESS NOTES
[facility] OFFICE [facility], [facility], [facility]    Report of Consultation    Ed Lo Patient Status:  Outpatient    1950 MRN [id number]   Location [facility], [address] [place] [address],  catheterization and had drug-eluting stents placed in the RCA ×1 and LAD ×2   • CATH DRUG ELUTING STENT     • COLONOSCOPY     • COLONOSCOPY, POSSIBLE BIOPSY, POSSIBLE POLYPECTOMY 19703 N/A 8/31/2017    Performed by Soraya Everett MD at [address] 05/08/2020    CREATSERUM 1.08 05/27/2020    BUN 15 05/27/2020     05/27/2020    K 3.6 05/27/2020     05/27/2020    CO2 31.0 05/27/2020     (H) 05/27/2020    CA 9.4 05/27/2020    ALB 4.2 05/08/2020    ALKPHO 95 05/08/2020    TP 7.6 05/08/

## 2020-07-13 ENCOUNTER — OFFICE VISIT (OUTPATIENT)
Dept: SLEEP CENTER | Age: 70
End: 2020-07-13
Attending: INTERNAL MEDICINE
Payer: MEDICARE

## 2020-07-13 DIAGNOSIS — G47.33 OSA (OBSTRUCTIVE SLEEP APNEA): ICD-10-CM

## 2020-07-13 PROCEDURE — 95806 SLEEP STUDY UNATT&RESP EFFT: CPT

## 2020-07-15 NOTE — PROCEDURES
[address] [facility]  Accredited by the [place] [employer] (AASM)    PATIENT'S NAME: Courtney Jerry   ATTENDING PHYSICIAN: Yuliana Dawson. Floyd Moralez MD   REFERRING PHYSICIAN: Yuliana Dawson.  Floyd Moralez MD   PATIENT ACCOUNT #: [id number] LOCATION G47.33) with an apnea plus hypopnea index of 14.4 events per hour. RECOMMENDATIONS:    1. Consider CPAP titration. 2.   Weight loss. 3.   Avoid alcohol. 4.   Avoid sedating drug. 5.   Patient should not drive if at all sleepy.     Please do not hes

## 2020-07-20 ENCOUNTER — LAB ENCOUNTER (OUTPATIENT)
Dept: LAB | Facility: HOSPITAL | Age: 70
End: 2020-07-20
Attending: INTERNAL MEDICINE
Payer: MEDICARE

## 2020-07-20 DIAGNOSIS — J44.9 CHRONIC OBSTRUCTIVE PULMONARY DISEASE, UNSPECIFIED COPD TYPE (HCC): ICD-10-CM

## 2020-07-20 DIAGNOSIS — Z87.891 FORMER SMOKER: ICD-10-CM

## 2020-07-20 DIAGNOSIS — R06.00 DYSPNEA, UNSPECIFIED TYPE: ICD-10-CM

## 2020-07-20 DIAGNOSIS — Z01.812 PRE-PROCEDURE LAB EXAM: Primary | ICD-10-CM

## 2020-07-20 LAB — SARS-COV-2 RNA RESP QL NAA+PROBE: NOT DETECTED

## 2020-07-21 ENCOUNTER — HOSPITAL ENCOUNTER (OUTPATIENT)
Dept: CT IMAGING | Age: 70
Discharge: HOME | End: 2020-07-21
Attending: INTERNAL MEDICINE
Payer: MEDICARE

## 2020-07-21 DIAGNOSIS — Z87.891 FORMER SMOKER: ICD-10-CM

## 2020-07-23 ENCOUNTER — HOSPITAL ENCOUNTER (OUTPATIENT)
Dept: RESPIRATORY THERAPY | Facility: HOSPITAL | Age: 70
Discharge: HOME | End: 2020-07-23
Attending: INTERNAL MEDICINE
Payer: MEDICARE

## 2020-07-23 DIAGNOSIS — Z87.891 FORMER SMOKER: ICD-10-CM

## 2020-07-23 DIAGNOSIS — J44.9 CHRONIC OBSTRUCTIVE PULMONARY DISEASE, UNSPECIFIED COPD TYPE (HCC): ICD-10-CM

## 2020-07-23 PROCEDURE — 94060 EVALUATION OF WHEEZING: CPT | Performed by: INTERNAL MEDICINE

## 2020-07-23 PROCEDURE — 94729 DIFFUSING CAPACITY: CPT | Performed by: INTERNAL MEDICINE

## 2020-07-23 PROCEDURE — 94726 PLETHYSMOGRAPHY LUNG VOLUMES: CPT | Performed by: INTERNAL MEDICINE

## 2020-07-24 ENCOUNTER — TELEPHONE (OUTPATIENT)
Dept: PULMONOLOGY | Facility: CLINIC | Age: 70
End: 2020-07-24

## 2020-07-24 DIAGNOSIS — R91.1 LUNG NODULE: Primary | ICD-10-CM

## 2020-07-27 ENCOUNTER — TELEPHONE (OUTPATIENT)
Dept: PULMONOLOGY | Facility: CLINIC | Age: 70
End: 2020-07-27

## 2020-07-27 NOTE — TELEPHONE ENCOUNTER
I already spoke to the wife in length earlier today , and asked her to schedule the  PET scan   Let me know if she wants me to call her again

## 2020-07-27 NOTE — TELEPHONE ENCOUNTER
Spoke with pt wife Gia Armando who states she spoke with Abdirahman Fuller but she was calling back to see when to follow up with him after PET scan. Jarvis Gabriel when would you like for pt to follow up?

## 2020-07-27 NOTE — TELEPHONE ENCOUNTER
Wife states she is returning Dr. Marion York call regarding test results. Wife states to please call her instead of pt due to pt having hearing aids that do not work that well.  Please call [phone]

## 2020-07-28 NOTE — TELEPHONE ENCOUNTER
Pt wife informed below. Pt wife voiced understanding and states she will call back after PET scan is done.

## 2020-07-31 ENCOUNTER — HOSPITAL ENCOUNTER (OUTPATIENT)
Dept: NUCLEAR MEDICINE | Facility: HOSPITAL | Age: 70
Discharge: HOME | End: 2020-07-31
Attending: INTERNAL MEDICINE
Payer: MEDICARE

## 2020-07-31 DIAGNOSIS — R91.1 LUNG NODULE: ICD-10-CM

## 2020-07-31 PROCEDURE — 78815 PET IMAGE W/CT SKULL-THIGH: CPT | Performed by: INTERNAL MEDICINE

## 2020-07-31 PROCEDURE — 82962 GLUCOSE BLOOD TEST: CPT

## 2020-08-01 LAB — GLUCOSE BLDC GLUCOMTR-MCNC: 121 MG/DL (ref 70–99)

## 2020-08-04 ENCOUNTER — TELEPHONE (OUTPATIENT)
Dept: PULMONOLOGY | Facility: CLINIC | Age: 70
End: 2020-08-04

## 2020-08-04 DIAGNOSIS — R91.1 LUNG NODULE: Primary | ICD-10-CM

## 2020-08-07 NOTE — ADDENDUM NOTE
Encounter addended by: Jl Hwang DO on: 8/7/2020 3:45 PM   Actions taken: Clinical Note Signed, Charge Capture section accepted

## 2020-08-10 ENCOUNTER — PATIENT MESSAGE (OUTPATIENT)
Dept: PULMONOLOGY | Facility: CLINIC | Age: 70
End: 2020-08-10

## 2020-08-12 NOTE — TELEPHONE ENCOUNTER
From: Calista Miranda  To: Melany Edwards. Yobani Lozano MD  Sent: 8/10/2020 9:21 AM CDT  Subject: Other    The dr had prescribed a very expensive inhaler at my first appt. I had sent a message regarding to you with no response.  Is there an inhaler that I can get

## 2020-09-14 RX ORDER — ALBUTEROL SULFATE 90 UG/1
2 AEROSOL, METERED RESPIRATORY (INHALATION) EVERY 6 HOURS PRN
Qty: 1 INHALER | Refills: 5 | Status: SHIPPED | OUTPATIENT
Start: 2020-09-14 | End: 2021-02-12 | Stop reason: ALTCHOICE

## 2020-11-02 ENCOUNTER — TELEPHONE (OUTPATIENT)
Dept: PULMONOLOGY | Facility: CLINIC | Age: 70
End: 2020-11-02

## 2020-11-07 ENCOUNTER — HOSPITAL ENCOUNTER (OUTPATIENT)
Dept: CT IMAGING | Age: 70
Discharge: HOME | End: 2020-11-07
Attending: INTERNAL MEDICINE
Payer: MEDICARE

## 2020-11-07 DIAGNOSIS — R91.1 LUNG NODULE: ICD-10-CM

## 2020-11-07 PROCEDURE — 71250 CT THORAX DX C-: CPT | Performed by: INTERNAL MEDICINE

## 2020-11-09 DIAGNOSIS — F43.10 PTSD (POST-TRAUMATIC STRESS DISORDER): ICD-10-CM

## 2020-11-09 DIAGNOSIS — I10 ESSENTIAL HYPERTENSION: ICD-10-CM

## 2020-11-09 RX ORDER — SERTRALINE HYDROCHLORIDE 100 MG/1
100 TABLET, FILM COATED ORAL DAILY
Qty: 90 TABLET | Refills: 0 | Status: SHIPPED | OUTPATIENT
Start: 2020-11-09 | End: 2021-02-08

## 2020-11-09 NOTE — TELEPHONE ENCOUNTER
Pharmacy requesting a refill.       SERTRALINE  MG Oral Tab 90 tablet 1 5/11/2020    Sig:   TAKE 1 TABLET BY MOUTH DAILY     Route:   (none)     Order #:   [id number]

## 2020-11-13 ENCOUNTER — OFFICE VISIT (OUTPATIENT)
Dept: PULMONOLOGY | Facility: CLINIC | Age: 70
End: 2020-11-13
Payer: MEDICARE

## 2020-11-13 VITALS
BODY MASS INDEX: 34.53 KG/M2 | WEIGHT: 220 LBS | SYSTOLIC BLOOD PRESSURE: 145 MMHG | DIASTOLIC BLOOD PRESSURE: 74 MMHG | OXYGEN SATURATION: 97 % | HEIGHT: 67 IN | HEART RATE: 67 BPM | RESPIRATION RATE: 18 BRPM

## 2020-11-13 DIAGNOSIS — R91.1 LUNG NODULE: Primary | ICD-10-CM

## 2020-11-13 DIAGNOSIS — G47.33 OSA (OBSTRUCTIVE SLEEP APNEA): ICD-10-CM

## 2020-11-13 DIAGNOSIS — J44.9 CHRONIC OBSTRUCTIVE PULMONARY DISEASE, UNSPECIFIED COPD TYPE (HCC): ICD-10-CM

## 2020-11-13 PROCEDURE — G0463 HOSPITAL OUTPT CLINIC VISIT: HCPCS | Performed by: INTERNAL MEDICINE

## 2020-11-13 PROCEDURE — 99214 OFFICE O/P EST MOD 30 MIN: CPT | Performed by: INTERNAL MEDICINE

## 2020-11-13 NOTE — PROGRESS NOTES
HPI:    Patient ID: Rica Martin is a 65year old male. HPI    Review of Systems   Constitutional: Negative. Respiratory: Negative. Cardiovascular: Negative. Gastrointestinal: Negative. Neurological: Negative.     Hematological: Nega needed for Wheezing. (Patient not taking: Reported on 11/13/2020 ) 1 Inhaler 5   • umeclidinium-vilanterol (ANORO ELLIPTA) 62.5-25 MCG/INH Inhalation Aerosol Powder, Breath Activated Inhale 1 puff into the lungs daily.  (Patient not taking: Reported on 11/1

## 2020-11-18 ENCOUNTER — TELEPHONE (OUTPATIENT)
Dept: PULMONOLOGY | Facility: CLINIC | Age: 70
End: 2020-11-18

## 2020-11-18 NOTE — TELEPHONE ENCOUNTER
----- Message from Carri Donws MD sent at 11/17/2020  4:51 PM CST -----  Please inform the patient that his recent chest CT showed stable small nodule of the lung  Follow-up another chest CT in 3 to 6 months

## 2020-11-20 NOTE — TELEPHONE ENCOUNTER
Patient advised of result note/order from Dr. Francesco Rodriguez via Josephine Maddox. CT chest ordered and placed in chronic calendar.

## 2020-12-21 NOTE — TELEPHONE ENCOUNTER
Dr. Kailee Yadav, can we try alternative for Anoro? Anoro ellipta is too expensive for patient. Spoke with pharmacy and they stated they don't know preferred med. Initial OB Visit    Lisa Santillan is a 26 year old  female at 8w3d by LMP who presents for initiation of prenatal care. OB intake questionnaire and RN visit notes reviewed.     HPI:  Patient's last menstrual period was 10/24/2020 (exact date).  Regular menses prior.   Patient denies abdominal pain or cramping.   Has been constipated and bloated. Taking colace daily.   Feels pressure during intercourse. Had spotting once but none since.   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 Patient's chart updated as indicated.    Genetic Screening/Teratology Counseling    Reviewed: 2020 10:58 AM Mother Father   Name of Parent:  Lisa Bar   Have you had genetic carrier testing?:  No No   If yes, what were the results?:      Any history of the following…     Thalassemia, or of Italian, Indian, Mediterranean or  descent?:  No Yes (Comment: Kazakh Decent; unknown if has thalassemia)   Neural tube defect, such as meningomyelocele, spina bifida, or anencephaly?:  Yes (Comment: Patient's brother; small amount of spina bifida when born; patient may have minor abnormality too)    Congenital heart defect?:  No No   Down syndrome?:  No No   Lowell-Sachs disease, or of Ashkenazi Alevism descent?:  No No   Sickle cell disease or trait:  No No   Hemophilia or other clotting disorders:  No No   Muscular dystrophy:  No No   Cystic fibrosis:  Neg No   Serge's chorea:  No No   Cognitive disability,  developmental delay, or autism?:  Neg No   If yes, was the person tested for Fragile X?:  No No   Any other inherited genetic or chromosomal disorder?:  No No   A metabolic disorder (e.g. insulin dependent diabetes or PKU):  No No   A child with any birth defects not listed above:  No No   Recurrent pregnancy loss (2 or more) or any stillbirth?:  No           Preventive Care:  Depression screening:   Recent PHQ 2/9 Score    PHQ 2:  Date Adult PHQ 2 Score Adult PHQ 2 Interpretation   12/7/2020 0 No further screening needed       Screening for intimate partner violence (HARK):  Within the last year, have you been:  Humiliated or emotionally abused in other ways by your partner or your ex-partner? No  Afraid of your partner or ex-partner? No  Raped or forced to have any kind of sexual activity by your partner or ex-partner? No  Kicked, hit, slapped or otherwise physically hurt by your partner or ex-partner? No    Immunizations:  Annual influenza vaccination: 9/24/20    Review of Systems:  All other systems reviewed and negative at this time except as documented in the HPI.    Physical Examination:  Visit Vitals  /74   Pulse (!) 110 Comment: Pt reports \"tachy is normal for me\"   Ht 5' 4\" (1.626 m)   Wt 92.8 kg   LMP 10/24/2020 (Exact Date)   BMI 35.12 kg/m²       General: well-appearing female  Psych: pleasant, cooperative, alert and oriented x3  Neck: neck supple, no thyromegaly  Lymph: no cervical or axillary adenopathy  Breast: no masses, no skin changes, no nipple discharge  Skin: no rashes or induration  Resp: normal respiratory effort, clear to auscultation bilaterally  CV: normal rate, regular rhythm, no edema of lower extremities  Abdomen: soft, non-tender, not distended, no masses, no abdominal hernias  Pelvic:    External genitalia: normal, no lesions  Urethra: normal  Bladder: non-tender  Vagina: normal vaginal epithelium, no abnormal discharge, no lesions   Cervix: normal, no lesions, no cervical  motion tenderness  Uterus: 8 week size uterus, non-tender  Adnexa: non-tender, no fullness or masses    Imaging:  Limited OB transvaginal ultrasound performed:  Single intrauterine gestational sac  Yolk sac visualized  Fetal pole visualized measuring 8w2d by CRL  Fetal cardiac activity visualized      Assessment/Plan:    Encounter for supervision of normal first pregnancy in first trimester  - Dating consistent with LMP  - Prenatal lab panel ordered. Patient gave her verbal consent for HIV testing.   - The patient was counseled on weight gain in pregnancy recommendations by the [employer] of [employer]; folic acid intake to decrease the risk of neural tube defects; fish intake in pregnancy; avoidance of raw/undercooked meat, unpasteurized dairy, and soft cheeses; avoidance of exposure to cat feces to decrease the risk of toxoplasmosis infection; and proper seat belt use in pregnancy. Informational handouts provided as well as ACOG month to month pregnancy book.   - Recommend prenatal vitamin with DHA  - Intimate partner violence screening negative.  - Depression screening negative.   - Pap and HPV testing up-to-date  - Discussed recommendation for Tdap vaccination and annual influenza vaccination in pregnancy. [facility] informational handout provided.   - Discussed the set up of our Ob/Gyn group here. Discussed delivery at [facility]. Encouraged her to call our office with any concerns or questions during pregnancy.   - Discussed COVID in pregnancy. Pt had COVID in October.   - Discussed with patient that I will have a maternity leave in the Spring which will require brief transfer of care to one of my partners, Destinee Uribe or Adilia. She verbalized an understanding and agreed with plan.     Carrier screening  - Genetic carrier screening was discussed including risks, benefits, and alternatives. We reviewed the three gene carrier screens offered to all pregnant women and the availability of expanded  screening.   - Pt will consider and let me know if she decides to pursue.    Aneuploidy Screening  - The options for aneuploidy screening were discussed in detail with the patient including first trimester screening, quad screen, maternal serum AFP screening, and cell free DNA testing. We discussed the role of ultrasonography in detecting congenital anomalies. Recommended an ultrasound for fetal anatomy at approximately 20 weeks. We discussed the risks and benefits of these tests. We discussed that these are all screening tests, and there is a risk of a false positive results. We discussed the options for diagnostic testing if an abnormal result was obtained including chorionic villus sampling (CVS) and amniocentesis. All questions were answered. She verbalized an understanding.  - Pt declines today after counseling. She will consider options and let me know if she would like to pursue further testing beyond 20 week anatomy ultrasound.     Follow-up in 4 weeks.     Daina Montoya MD  12/22/20 2:15 PM

## 2021-01-04 ENCOUNTER — OFFICE VISIT (OUTPATIENT)
Dept: AUDIOLOGY | Facility: CLINIC | Age: 71
End: 2021-01-04
Payer: MEDICARE

## 2021-01-04 ENCOUNTER — OFFICE VISIT (OUTPATIENT)
Dept: OTOLARYNGOLOGY | Facility: CLINIC | Age: 71
End: 2021-01-04
Payer: MEDICARE

## 2021-01-04 VITALS
BODY MASS INDEX: 34.53 KG/M2 | DIASTOLIC BLOOD PRESSURE: 86 MMHG | SYSTOLIC BLOOD PRESSURE: 153 MMHG | HEIGHT: 67 IN | TEMPERATURE: 98 F | WEIGHT: 220 LBS

## 2021-01-04 DIAGNOSIS — H61.23 BILATERAL IMPACTED CERUMEN: Primary | ICD-10-CM

## 2021-01-04 DIAGNOSIS — H90.3 SENSORINEURAL HEARING LOSS, BILATERAL: ICD-10-CM

## 2021-01-04 DIAGNOSIS — H69.92 EUSTACHIAN TUBE DISORDER, LEFT: ICD-10-CM

## 2021-01-04 DIAGNOSIS — H69.83 DYSFUNCTION OF BOTH EUSTACHIAN TUBES: ICD-10-CM

## 2021-01-04 PROCEDURE — 99213 OFFICE O/P EST LOW 20 MIN: CPT | Performed by: OTOLARYNGOLOGY

## 2021-01-04 PROCEDURE — 92504 EAR MICROSCOPY EXAMINATION: CPT | Performed by: OTOLARYNGOLOGY

## 2021-01-04 PROCEDURE — 92557 COMPREHENSIVE HEARING TEST: CPT | Performed by: AUDIOLOGIST

## 2021-01-04 PROCEDURE — 92567 TYMPANOMETRY: CPT | Performed by: AUDIOLOGIST

## 2021-01-04 RX ORDER — FLUTICASONE PROPIONATE 50 MCG
2 SPRAY, SUSPENSION (ML) NASAL DAILY
Qty: 1 BOTTLE | Refills: 11 | Status: SHIPPED | OUTPATIENT
Start: 2021-01-04 | End: 2021-02-03

## 2021-01-04 NOTE — PROGRESS NOTES
AUDIOGRAM     Tasneem Weston was referred for testing by Shannen Trinidad for clogged feeling in the left ear  2/6/1950  [id number]      Patient has a known SNHL and wears binaural hearing aids obtained elsewhere        Otoscopic inspection: right ear n

## 2021-01-04 NOTE — PROGRESS NOTES
Judy Lopez is a 65year old male. Patient presents with:  Ear Wax: both ears      HISTORY OF PRESENT ILLNESS  1/29/2020  Patient presents with complaints  of hearing loss worse on the left than the right.   He actually has a little discomfort CAD   • Hypertension Mother        Past Medical History:   Diagnosis Date   • Amputation, finger, traumatic     partial amputation of finger.  Multiple accidents with table saws   • Colon cancer [facility])    • Coronary atherosclerosis    • Essential hypertensio Palpation - Normal. Parotid gland - Normal. Thyroid gland - Normal.   Eyes Normal Conjunctiva - Right: Normal, Left: Normal. Pupil - Right: Normal, Left: Normal.    Neurological Normal Memory - Normal. Cranial nerves - Cranial nerves II through XII grossly (ZETIA) 10 MG Oral Tab, Take 1 tablet (10 mg total) by mouth daily. , Disp: 90 tablet, Rfl: 3  •  umeclidinium-vilanterol (ANORO ELLIPTA) 62.5-25 MCG/INH Inhalation Aerosol Powder, Breath Activated, Inhale 1 puff into the lungs daily. , Disp: 1 each, Rfl: 3 exist  Kamryn Cruz MD 1/4/2021

## 2021-01-27 DIAGNOSIS — Z23 NEED FOR VACCINATION: ICD-10-CM

## 2021-02-04 ENCOUNTER — LAB ENCOUNTER (OUTPATIENT)
Dept: LAB | Age: 71
End: 2021-02-04
Attending: FAMILY MEDICINE
Payer: MEDICARE

## 2021-02-04 ENCOUNTER — IMMUNIZATION (OUTPATIENT)
Dept: LAB | Facility: HOSPITAL | Age: 71
End: 2021-02-04
Attending: HOSPITALIST
Payer: MEDICARE

## 2021-02-04 DIAGNOSIS — Z23 NEED FOR VACCINATION: Primary | ICD-10-CM

## 2021-02-04 DIAGNOSIS — I10 ESSENTIAL HYPERTENSION: ICD-10-CM

## 2021-02-04 LAB
ALBUMIN SERPL-MCNC: 4 G/DL (ref 3.4–5)
ALBUMIN/GLOB SERPL: 1.2 {RATIO} (ref 1–2)
ALP LIVER SERPL-CCNC: 95 U/L
ALT SERPL-CCNC: 49 U/L
ANION GAP SERPL CALC-SCNC: 5 MMOL/L (ref 0–18)
AST SERPL-CCNC: 30 U/L (ref 15–37)
BILIRUB SERPL-MCNC: 0.4 MG/DL (ref 0.1–2)
BUN BLD-MCNC: 25 MG/DL (ref 7–18)
BUN/CREAT SERPL: 25.3 (ref 10–20)
CALCIUM BLD-MCNC: 9.5 MG/DL (ref 8.5–10.1)
CHLORIDE SERPL-SCNC: 103 MMOL/L (ref 98–112)
CHOLEST SMN-MCNC: 151 MG/DL (ref ?–200)
CO2 SERPL-SCNC: 31 MMOL/L (ref 21–32)
CREAT BLD-MCNC: 0.99 MG/DL
GLOBULIN PLAS-MCNC: 3.3 G/DL (ref 2.8–4.4)
GLUCOSE BLD-MCNC: 118 MG/DL (ref 70–99)
HDLC SERPL-MCNC: 62 MG/DL (ref 40–59)
LDLC SERPL CALC-MCNC: 73 MG/DL (ref ?–100)
M PROTEIN MFR SERPL ELPH: 7.3 G/DL (ref 6.4–8.2)
NONHDLC SERPL-MCNC: 89 MG/DL (ref ?–130)
OSMOLALITY SERPL CALC.SUM OF ELEC: 293 MOSM/KG (ref 275–295)
PATIENT FASTING Y/N/NP: YES
PATIENT FASTING Y/N/NP: YES
POTASSIUM SERPL-SCNC: 4.3 MMOL/L (ref 3.5–5.1)
SODIUM SERPL-SCNC: 139 MMOL/L (ref 136–145)
TRIGL SERPL-MCNC: 81 MG/DL (ref 30–149)
TSI SER-ACNC: 1.33 MIU/ML (ref 0.36–3.74)
VLDLC SERPL CALC-MCNC: 16 MG/DL (ref 0–30)

## 2021-02-04 PROCEDURE — 36415 COLL VENOUS BLD VENIPUNCTURE: CPT

## 2021-02-04 PROCEDURE — 0011A SARSCOV2 VAC 100MCG/0.5ML IM: CPT

## 2021-02-04 PROCEDURE — 80061 LIPID PANEL: CPT

## 2021-02-04 PROCEDURE — 84443 ASSAY THYROID STIM HORMONE: CPT

## 2021-02-04 PROCEDURE — 80053 COMPREHEN METABOLIC PANEL: CPT

## 2021-02-08 DIAGNOSIS — I10 ESSENTIAL HYPERTENSION: ICD-10-CM

## 2021-02-08 DIAGNOSIS — F43.10 PTSD (POST-TRAUMATIC STRESS DISORDER): ICD-10-CM

## 2021-02-09 RX ORDER — SERTRALINE HYDROCHLORIDE 100 MG/1
100 TABLET, FILM COATED ORAL DAILY
Qty: 90 TABLET | Refills: 1 | Status: SHIPPED | OUTPATIENT
Start: 2021-02-09 | End: 2021-07-19

## 2021-02-09 NOTE — TELEPHONE ENCOUNTER
Routed to Dr Madisyn Islas for advise, thanks. Requested Prescriptions     Pending Prescriptions Disp Refills   • Sertraline HCl 100 MG Oral Tab 90 tablet 1     Sig: Take 1 tablet (100 mg total) by mouth daily. Please advise. Thank you.

## 2021-02-12 ENCOUNTER — OFFICE VISIT (OUTPATIENT)
Dept: FAMILY MEDICINE CLINIC | Facility: CLINIC | Age: 71
End: 2021-02-12
Payer: MEDICARE

## 2021-02-12 VITALS
SYSTOLIC BLOOD PRESSURE: 129 MMHG | BODY MASS INDEX: 35.42 KG/M2 | WEIGHT: 225.69 LBS | HEIGHT: 67 IN | HEART RATE: 65 BPM | DIASTOLIC BLOOD PRESSURE: 73 MMHG

## 2021-02-12 DIAGNOSIS — C18.7 MALIGNANT NEOPLASM OF SIGMOID COLON (HCC): ICD-10-CM

## 2021-02-12 DIAGNOSIS — R73.03 PREDIABETES: ICD-10-CM

## 2021-02-12 DIAGNOSIS — F43.10 PTSD (POST-TRAUMATIC STRESS DISORDER): ICD-10-CM

## 2021-02-12 DIAGNOSIS — I10 ESSENTIAL HYPERTENSION WITH GOAL BLOOD PRESSURE LESS THAN 140/90: ICD-10-CM

## 2021-02-12 DIAGNOSIS — Z12.5 PROSTATE CANCER SCREENING: ICD-10-CM

## 2021-02-12 DIAGNOSIS — I25.118 CORONARY ARTERY DISEASE INVOLVING NATIVE CORONARY ARTERY OF NATIVE HEART WITH OTHER FORM OF ANGINA PECTORIS (HCC): ICD-10-CM

## 2021-02-12 DIAGNOSIS — E78.2 MIXED HYPERLIPIDEMIA: ICD-10-CM

## 2021-02-12 DIAGNOSIS — L30.0 NUMMULAR ECZEMA: ICD-10-CM

## 2021-02-12 DIAGNOSIS — Z85.820 HX OF MELANOMA OF SKIN: ICD-10-CM

## 2021-02-12 DIAGNOSIS — Z00.00 MEDICARE ANNUAL WELLNESS VISIT, SUBSEQUENT: Primary | ICD-10-CM

## 2021-02-12 DIAGNOSIS — Z00.00 ENCOUNTER FOR ANNUAL HEALTH EXAMINATION: ICD-10-CM

## 2021-02-12 DIAGNOSIS — J44.9 CHRONIC OBSTRUCTIVE PULMONARY DISEASE, UNSPECIFIED COPD TYPE (HCC): ICD-10-CM

## 2021-02-12 PROBLEM — I25.10 CORONARY ARTERY DISEASE: Status: ACTIVE | Noted: 2021-02-12

## 2021-02-12 PROCEDURE — G0439 PPPS, SUBSEQ VISIT: HCPCS | Performed by: FAMILY MEDICINE

## 2021-02-12 PROCEDURE — 99212 OFFICE O/P EST SF 10 MIN: CPT | Performed by: FAMILY MEDICINE

## 2021-02-12 NOTE — PATIENT INSTRUCTIONS
Fall Prevention  Falls often occur due to slipping, tripping or losing your balance. Millions of people fall every year and injure themselves. Here are ways to reduce your risk of falling again.    · Think about your fall, was there anything that caused y · If you have pets, know where they are before you stand up or walk so you don't trip over them. · Use night lights. · Go over all your medicines with a pharmacist or other healthcare provider to see if any of them could make you more likely to fall.   St EKG - covered if needed at Welcome to Medicare, and non-screening if indicated for medical reasons    Electrocardiogram date04/20/2020 Routine EKG is not a screening covered service except at the Welcome to Medicare Visit    Abdominal aortic aneurysm scre Covered Once after 65 Orders placed or performed in visit on 10/02/19   • PNEUMOCOCCAL IMM (PNEUMOVAX)    Please get once after your 65th birthday    Hepatitis B for Moderate/High Risk No orders found for this or any previous visit.  Medium/high risk factor

## 2021-02-12 NOTE — PROGRESS NOTES
HPI:   Javed Dacosta is a 66year old male who presents for a Medicare Subsequent Annual Wellness visit (Pt already had Initial Annual Wellness). Feeling well. Will resume working again when the weather improves.   Annual Physical due on 02/12/2 and had a score of 0 so is at low risk.      Patient Care Team: Patient Care Team:  Elsa Jaeger DO as PCP - General (Family Medicine)  Elsa Jaeger DO as PCP - Jeremy Mcgill MD as Consulting Physician ([facility] 0089)    Patient Ac by mouth nightly. •  ezetimibe (ZETIA) 10 MG Oral Tab, Take 1 tablet (10 mg total) by mouth daily.     •  POTASSIUM CHLORIDE ER 10 MEQ Oral Tab CR, TAKE 1 TABLET(10 MEQ) BY MOUTH TWICE DAILY    •  finasteride 5 MG Oral Tab, Take 1 tablet (5 mg total) by complaint of urinary incontinence  MUSCULOSKELETAL: denies back pain  NEURO: denies headaches  PSYCHE: denies depression or anxiety  HEMATOLOGIC: denies hx of anemia  ENDOCRINE: denies thyroid history  ALL/ASTHMA: denies hx of allergy or asthma    EXAM: Acuity: Corrected Right Eye Chart Acuity: 20/25   Left Eye Visual Acuity: Corrected Left Eye Chart Acuity: 20/30   Both Eyes Visual Acuity: Corrected Both Eyes Chart Acuity: 20/25   Able To Tolerate Visual Acuity: Yes      General Appearance:  Alert, coope • Pneumovax 23 10/02/2019   • TDAP 07/05/2018   • Zoster Vaccine Live (Zostavax) 10/10/2012        ASSESSMENT AND OTHER RELEVANT CHRONIC CONDITIONS:   Iggy Melo is a 66year old male who presents for a Medicare Assessment.      PLAN SUMMARY: No results found for: A1C    No flowsheet data found.     Fasting Blood Sugar (FSB)Annually Glucose (mg/dL)   Date Value   02/04/2021 118 (H)   10/23/2006 98       Cardiovascular Disease Screening     LDL Annually LDL Cholesterol (mg/dL)   Date Value   02/0 covered with your pharmacy  prescription benefits      SPECIFIC DISEASE MONITORING Internal Lab or Procedure External Lab or Procedure      Annual Monitoring of Persistent     Medications (ACE/ARB, digoxin diuretics, anticonvulsants.)    Potassium  Annuall

## 2021-02-17 ENCOUNTER — TELEPHONE (OUTPATIENT)
Dept: PULMONOLOGY | Facility: CLINIC | Age: 71
End: 2021-02-17

## 2021-02-27 NOTE — TELEPHONE ENCOUNTER
Per pharmacy pt is requesting refill for the following medication. •  finasteride 5 MG Oral Tab, Take 1 tablet (5 mg total) by mouth once daily. , Disp: 90 tablet, Rfl: 3

## 2021-03-01 RX ORDER — FINASTERIDE 5 MG/1
5 TABLET, FILM COATED ORAL
Qty: 90 TABLET | Refills: 3 | Status: SHIPPED | OUTPATIENT
Start: 2021-03-01 | End: 2021-12-09

## 2021-03-04 ENCOUNTER — IMMUNIZATION (OUTPATIENT)
Dept: LAB | Facility: HOSPITAL | Age: 71
End: 2021-03-04
Attending: EMERGENCY MEDICINE
Payer: MEDICARE

## 2021-03-04 DIAGNOSIS — Z23 NEED FOR VACCINATION: Primary | ICD-10-CM

## 2021-03-04 PROCEDURE — 0012A SARSCOV2 VAC 100MCG/0.5ML IM: CPT

## 2021-03-16 NOTE — H&P
[address] [facility] [address] Gastroenterology                                                                                                  Clinic History and Physical     Pa Diagnosis Date   • Amputation, finger, traumatic     partial amputation of finger.  Multiple accidents with table saws   • Colon cancer [facility])    • Coronary atherosclerosis    • Essential hypertension    • High blood pressure    • History of blood clots needed for gout 15 tablet 0   • HYDROcodone-acetaminophen 5-325 MG Oral Tab TAKE 1 TO 2 TABLETS BY MOUTH EVERY 4 TO 6 HOURS AS NEEDED FOR PAIN. MAXIMUM 8 TABLETS DAILY     • Fluticasone Propionate 50 MCG/ACT Nasal Suspension daily.      • hydrochlorothiazid erythema  BEHAVIOR/PSYCH:  negative for psychotic behavior      PHYSICAL EXAM:   Blood pressure 157/89, pulse 69, temperature 98.1 °F (36.7 °C), temperature source Oral, weight 229 lb 9.6 oz (104.1 kg).     Gen: patient appears comfortable and in no acute d cardiac care of Dr. Mini Rooney and doing well from a cardiac perspective. Given his history and prior colonoscopy recall recommendations, he would be due for procedure.   I reviewed colonoscopy procedure, all questions/concerns were addressed, the patient is questions were answered to the patient’s satisfaction. The patient signed informed consent and elected to proceed with colonoscopy with intervention [i.e. polypectomy, stent placement, etc.] as indicated.             Orders This Visit:  No orders of the def

## 2021-03-18 ENCOUNTER — LAB ENCOUNTER (OUTPATIENT)
Dept: LAB | Age: 71
End: 2021-03-18
Attending: FAMILY MEDICINE
Payer: MEDICARE

## 2021-03-18 ENCOUNTER — OFFICE VISIT (OUTPATIENT)
Dept: FAMILY MEDICINE CLINIC | Facility: CLINIC | Age: 71
End: 2021-03-18
Payer: MEDICARE

## 2021-03-18 VITALS
HEIGHT: 67 IN | DIASTOLIC BLOOD PRESSURE: 80 MMHG | OXYGEN SATURATION: 100 % | HEART RATE: 83 BPM | BODY MASS INDEX: 35.63 KG/M2 | SYSTOLIC BLOOD PRESSURE: 140 MMHG | WEIGHT: 227 LBS

## 2021-03-18 DIAGNOSIS — R05.9 COUGH: ICD-10-CM

## 2021-03-18 DIAGNOSIS — S62.101D CLOSED FRACTURE OF RIGHT WRIST WITH ROUTINE HEALING, SUBSEQUENT ENCOUNTER: Primary | ICD-10-CM

## 2021-03-18 LAB — SARS-COV-2 RNA RESP QL NAA+PROBE: NOT DETECTED

## 2021-03-18 PROCEDURE — 99214 OFFICE O/P EST MOD 30 MIN: CPT | Performed by: FAMILY MEDICINE

## 2021-03-18 RX ORDER — MULTIVITAMIN/IRON/FOLIC ACID 18MG-0.4MG
TABLET ORAL
COMMUNITY

## 2021-03-18 RX ORDER — HYDROCHLOROTHIAZIDE 12.5 MG/1
12.5 TABLET ORAL DAILY
COMMUNITY
Start: 2021-03-15 | End: 2021-10-19

## 2021-03-18 RX ORDER — FLUTICASONE PROPIONATE 50 MCG
SPRAY, SUSPENSION (ML) NASAL DAILY
COMMUNITY
Start: 2021-01-04

## 2021-03-18 RX ORDER — AZITHROMYCIN 250 MG/1
TABLET, FILM COATED ORAL
Qty: 6 TABLET | Refills: 0 | Status: SHIPPED | OUTPATIENT
Start: 2021-03-18 | End: 2021-03-23

## 2021-03-18 NOTE — PROGRESS NOTES
Blood pressure 140/80, pulse 83, height 5' 7\" (1.702 m), weight 227 lb (103 kg), SpO2 100 %. Following up after he rolled out of bed and broke his left wrist.  Has seen orthopedics already splint was placed patient continues to have a lot of pain.   Rep

## 2021-03-24 ENCOUNTER — HOSPITAL ENCOUNTER (OUTPATIENT)
Dept: GENERAL RADIOLOGY | Facility: HOSPITAL | Age: 71
Discharge: HOME | End: 2021-03-24
Attending: ORTHOPAEDIC SURGERY
Payer: MEDICARE

## 2021-03-24 ENCOUNTER — OFFICE VISIT (OUTPATIENT)
Dept: ORTHOPEDICS CLINIC | Facility: CLINIC | Age: 71
End: 2021-03-24
Payer: MEDICARE

## 2021-03-24 VITALS
HEART RATE: 74 BPM | WEIGHT: 225 LBS | HEIGHT: 66 IN | RESPIRATION RATE: 16 BRPM | SYSTOLIC BLOOD PRESSURE: 141 MMHG | BODY MASS INDEX: 36.16 KG/M2 | DIASTOLIC BLOOD PRESSURE: 78 MMHG

## 2021-03-24 DIAGNOSIS — S62.102A CLOSED FRACTURE OF LEFT WRIST, INITIAL ENCOUNTER: ICD-10-CM

## 2021-03-24 DIAGNOSIS — R52 PAIN: Primary | ICD-10-CM

## 2021-03-24 DIAGNOSIS — R52 PAIN: ICD-10-CM

## 2021-03-24 DIAGNOSIS — M10.432 OTHER SECONDARY ACUTE GOUT OF LEFT WRIST: ICD-10-CM

## 2021-03-24 PROCEDURE — 99204 OFFICE O/P NEW MOD 45 MIN: CPT | Performed by: ORTHOPAEDIC SURGERY

## 2021-03-24 PROCEDURE — 20605 DRAIN/INJ JOINT/BURSA W/O US: CPT | Performed by: ORTHOPAEDIC SURGERY

## 2021-03-24 PROCEDURE — 73110 X-RAY EXAM OF WRIST: CPT | Performed by: ORTHOPAEDIC SURGERY

## 2021-03-24 RX ORDER — HYDROCODONE BITARTRATE AND ACETAMINOPHEN 5; 325 MG/1; MG/1
TABLET ORAL
COMMUNITY
Start: 2021-03-16 | End: 2021-06-29 | Stop reason: ALTCHOICE

## 2021-03-24 RX ORDER — TRIAMCINOLONE ACETONIDE 40 MG/ML
20 INJECTION, SUSPENSION INTRA-ARTICULAR; INTRAMUSCULAR ONCE
Status: COMPLETED | OUTPATIENT
Start: 2021-03-24 | End: 2021-03-24

## 2021-03-24 RX ADMIN — TRIAMCINOLONE ACETONIDE 20 MG: 40 INJECTION, SUSPENSION INTRA-ARTICULAR; INTRAMUSCULAR at 14:40:00

## 2021-03-24 NOTE — PROGRESS NOTES
Per verbal order from Dr. Katelynn Ramos, draw up and 0.5ml of 0.5% Marcaine and 0.5ml of Kenalog 40 for injection into left  Wrist.Yazmin PANTOJA RN  Patient provided education handout for cortisone injection.    Patient left office prior to obtaining post injection vi

## 2021-03-24 NOTE — PROGRESS NOTES
NURSING INTAKE COMMENTS: Patient presents with:  Consult: left wrist injury- pt states he fell 2 wks ago. pt saw Dr. Clayton Nuñez at [facility],but wanted to change physicians. HPI: This 66year old male presents today with complaints of left wrist pain. Oral Tab Take 1 tablet (5 mg total) by mouth once daily. 90 tablet 3   • Sertraline HCl 100 MG Oral Tab Take 1 tablet (100 mg total) by mouth daily.  90 tablet 1   • Clopidogrel Bisulfate 75 MG Oral Tab TAKE 1 TABLET(75 MG) BY MOUTH DAILY 90 tablet 3   • lo breath  CARDIOVASCULAR: denies chest pain  GI: no hematemesis, no worsening heartburn, no diarrhea  : no dysuria, no blood in urine, no difficulty urinating, no incontinence  MUSCULOSKELETAL: no other musculoskeletal complaints other than in HPI  NEURO: symptoms persist.    The above note was creating using Dragon speech recognition technology. Please excuse any typos.     Tyron Dubois MD

## 2021-03-30 ENCOUNTER — OFFICE VISIT (OUTPATIENT)
Dept: GASTROENTEROLOGY | Facility: CLINIC | Age: 71
End: 2021-03-30
Payer: MEDICARE

## 2021-03-30 ENCOUNTER — TELEPHONE (OUTPATIENT)
Dept: GASTROENTEROLOGY | Facility: CLINIC | Age: 71
End: 2021-03-30

## 2021-03-30 VITALS
WEIGHT: 229.63 LBS | BODY MASS INDEX: 37 KG/M2 | SYSTOLIC BLOOD PRESSURE: 157 MMHG | DIASTOLIC BLOOD PRESSURE: 89 MMHG | HEART RATE: 69 BPM | TEMPERATURE: 98 F

## 2021-03-30 DIAGNOSIS — Z85.038 HISTORY OF COLON CANCER: Primary | ICD-10-CM

## 2021-03-30 DIAGNOSIS — Z86.010 HISTORY OF COLON POLYPS: ICD-10-CM

## 2021-03-30 PROCEDURE — 99203 OFFICE O/P NEW LOW 30 MIN: CPT | Performed by: NURSE PRACTITIONER

## 2021-03-30 RX ORDER — SODIUM, POTASSIUM,MAG SULFATES 17.5-3.13G
SOLUTION, RECONSTITUTED, ORAL ORAL
Qty: 1 BOTTLE | Refills: 0 | Status: SHIPPED | OUTPATIENT
Start: 2021-03-30 | End: 2021-06-29 | Stop reason: ALTCHOICE

## 2021-03-30 NOTE — PATIENT INSTRUCTIONS
-Schedule colonoscopy w/ Dr. Nancie Allen or Dr. Claudell Public with [facility] due to medical history  Dx: hx colon cancer, hx colon polyps   -Eligible for NE: No r/t medical history  -Prep: Split dose Suprep for Colyte/TriLyte or equivalent  -Anti-platelets and anti-coa none

## 2021-03-30 NOTE — TELEPHONE ENCOUNTER
RN the patient is scheduled for a colonoscopy on 7/20/21.  Please see note below      Anti-platelets and anti-coagulants: Plavix (Please contact prescribing MD (Dr. Julienne Du) for specific recommendations including number of days to be held prior to procedure

## 2021-03-30 NOTE — TELEPHONE ENCOUNTER
Scheduled for:  Colonoscopy [address]3897  Provider Name:  Dr. Kat Ear  Date:  7/20/21  Location:  [facility]  Sedation:  [facility]  Time:  8:15am (pt is aware to arrive at 7:15am)  Prep:  Suprep  Meds/Allergies Reconciled?:  Physician reviewed   Diagnosis with codes: His

## 2021-03-30 NOTE — TELEPHONE ENCOUNTER
Noted & appreciated. I faxed anticoagulant adjustment request form to Dr. Martinez See office (fax # [phone]) for Plavix orders prior to patient's procedure on 7/20/21 w/ Dr. Paty Sierra.

## 2021-04-07 ENCOUNTER — HOSPITAL ENCOUNTER (OUTPATIENT)
Dept: GENERAL RADIOLOGY | Facility: HOSPITAL | Age: 71
Discharge: HOME | End: 2021-04-07
Attending: ORTHOPAEDIC SURGERY
Payer: MEDICARE

## 2021-04-07 ENCOUNTER — OFFICE VISIT (OUTPATIENT)
Dept: ORTHOPEDICS CLINIC | Facility: CLINIC | Age: 71
End: 2021-04-07
Payer: MEDICARE

## 2021-04-07 VITALS — WEIGHT: 229 LBS | BODY MASS INDEX: 36.8 KG/M2 | HEIGHT: 66 IN

## 2021-04-07 DIAGNOSIS — Z47.89 ORTHOPEDIC AFTERCARE: Primary | ICD-10-CM

## 2021-04-07 DIAGNOSIS — M10.432 OTHER SECONDARY ACUTE GOUT OF LEFT WRIST: ICD-10-CM

## 2021-04-07 DIAGNOSIS — S62.102A CLOSED FRACTURE OF LEFT WRIST, INITIAL ENCOUNTER: ICD-10-CM

## 2021-04-07 DIAGNOSIS — Z47.89 ORTHOPEDIC AFTERCARE: ICD-10-CM

## 2021-04-07 PROCEDURE — 73110 X-RAY EXAM OF WRIST: CPT | Performed by: ORTHOPAEDIC SURGERY

## 2021-04-07 PROCEDURE — 99213 OFFICE O/P EST LOW 20 MIN: CPT | Performed by: ORTHOPAEDIC SURGERY

## 2021-04-07 NOTE — PROGRESS NOTES
NURSING INTAKE COMMENTS: Patient presents with:  Wrist Pain: 2 wk f/u on Left wrist. per patient feeling better and stonger. Rates pain 1/10 at this time.        HPI: This 66year old male presents today for follow-up of his left wrist.  He reports that he Oral Tab Take by mouth. • finasteride 5 MG Oral Tab Take 1 tablet (5 mg total) by mouth once daily. 90 tablet 3   • Sertraline HCl 100 MG Oral Tab Take 1 tablet (100 mg total) by mouth daily.  90 tablet 1   • Clopidogrel Bisulfate 75 MG Oral Tab TAKE 1 of Systems:  GENERAL: feels generally well, no significant weight loss or weight gain  SKIN: no ulcerated or worrisome skin lesions  EYES:denies blurred vision or double vision  HEENT: denies new nasal congestion, sinus pain or ST  LUNGS: denies shortness Nondisplaced subacute fracture through the radial styloid with intra-articular extension. 2. Distal radioulnar degeneration. Cortical erosions distal ulna and ulnar styloid suggestive erosive arthropathy.   Superimposed chondrocalcinosis (CPPD) triangular

## 2021-04-12 ENCOUNTER — HOSPITAL ENCOUNTER (OUTPATIENT)
Dept: CT IMAGING | Age: 71
Discharge: HOME | End: 2021-04-12
Attending: INTERNAL MEDICINE
Payer: MEDICARE

## 2021-04-12 DIAGNOSIS — R91.1 LUNG NODULE: ICD-10-CM

## 2021-04-12 PROCEDURE — 71250 CT THORAX DX C-: CPT | Performed by: INTERNAL MEDICINE

## 2021-04-29 DIAGNOSIS — R91.1 LUNG NODULE: Primary | ICD-10-CM

## 2021-05-05 ENCOUNTER — HOSPITAL ENCOUNTER (OUTPATIENT)
Dept: GENERAL RADIOLOGY | Facility: HOSPITAL | Age: 71
Discharge: HOME | End: 2021-05-05
Attending: ORTHOPAEDIC SURGERY
Payer: MEDICARE

## 2021-05-05 ENCOUNTER — OFFICE VISIT (OUTPATIENT)
Dept: ORTHOPEDICS CLINIC | Facility: CLINIC | Age: 71
End: 2021-05-05
Payer: MEDICARE

## 2021-05-05 VITALS — HEIGHT: 66 IN | BODY MASS INDEX: 36.8 KG/M2 | WEIGHT: 229 LBS

## 2021-05-05 DIAGNOSIS — Z47.89 ORTHOPEDIC AFTERCARE: Primary | ICD-10-CM

## 2021-05-05 DIAGNOSIS — Z47.89 ORTHOPEDIC AFTERCARE: ICD-10-CM

## 2021-05-05 PROCEDURE — 99024 POSTOP FOLLOW-UP VISIT: CPT | Performed by: ORTHOPAEDIC SURGERY

## 2021-05-05 PROCEDURE — 73110 X-RAY EXAM OF WRIST: CPT | Performed by: ORTHOPAEDIC SURGERY

## 2021-05-05 NOTE — PROGRESS NOTES
NURSING INTAKE COMMENTS: Patient presents with:  Wrist Pain: 2 wk f/u on left wrist. per patient doing well no pain. HPI: This 66year old male presents today for follow-up on his left wrist fracture. He has no pain.   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 90 tablet 3   • Rosuvastatin Calcium 20 MG Oral Tab Take 1 tablet (20 mg total) by mouth nightly.  90 tablet 3   • verapami no significant weight loss or weight gain  SKIN: no ulcerated or worrisome skin lesions  EYES:denies blurred vision or double vision  HEENT: denies new nasal congestion, sinus pain or ST  LUNGS: denies shortness of breath  CARDIOVASCULAR: denies chest pain typos.    This note was scribed by Dasha Sepulveda PA-C for Dr. Kayli Quintana who was present for the patient's evaluation and performed all medical decision-making. I personally evaluated the patient and performed all key components of the service rendered.  I sha

## 2021-05-07 ENCOUNTER — OFFICE VISIT (OUTPATIENT)
Dept: PULMONOLOGY | Facility: CLINIC | Age: 71
End: 2021-05-07
Payer: MEDICARE

## 2021-05-07 VITALS
HEART RATE: 75 BPM | BODY MASS INDEX: 36.96 KG/M2 | RESPIRATION RATE: 18 BRPM | OXYGEN SATURATION: 97 % | SYSTOLIC BLOOD PRESSURE: 143 MMHG | HEIGHT: 66 IN | WEIGHT: 230 LBS | DIASTOLIC BLOOD PRESSURE: 87 MMHG

## 2021-05-07 DIAGNOSIS — R91.1 LUNG NODULE: Primary | ICD-10-CM

## 2021-05-07 DIAGNOSIS — J44.9 CHRONIC OBSTRUCTIVE PULMONARY DISEASE, UNSPECIFIED COPD TYPE (HCC): ICD-10-CM

## 2021-05-07 PROCEDURE — 99214 OFFICE O/P EST MOD 30 MIN: CPT | Performed by: INTERNAL MEDICINE

## 2021-05-07 NOTE — PROGRESS NOTES
HPI/Subjective:   Patient ID: Josh Andres is a 66year old male.     HPI  Gained about 15 pounds last year  Less active and more dyspnea on exertion / chronic   No chest pain orthopnea PND  No lower extremity edema pain or calf tenderness  Inhaler Sulfate-Mg Sulf (SUPREP BOWEL PREP KIT) 17.5-3.13-1.6 GM/177ML Oral Solution Take prep as directed by gastro office.  May substitute with Trilyte/generic equivalent if needed 1 Bottle 0   • predniSONE 10 MG Oral Tab Take 1 tab 3 times daily as needed for go walk   relatively normal lung exam      Albuterol prn   Unable to afford other inhalers   Had covid 19 vaccination      3- chronic dyspnea on exertion   Worse last year with less activity and weight gain   Deconditioning  No hypoxia     Diet and exercise a

## 2021-05-12 ENCOUNTER — TELEPHONE (OUTPATIENT)
Dept: GASTROENTEROLOGY | Facility: CLINIC | Age: 71
End: 2021-05-12

## 2021-05-12 DIAGNOSIS — Z86.010 PERSONAL HISTORY OF COLONIC POLYPS: Primary | ICD-10-CM

## 2021-05-12 DIAGNOSIS — Z85.038 PERSONAL HISTORY OF COLON CANCER: ICD-10-CM

## 2021-05-12 NOTE — TELEPHONE ENCOUNTER
Per phone room TE, pt would like to cancel his procedure, he will be seeing a different provider. Canceled in [address] I sent an electronic CANCEL request to Endo Scheduling and received a confirmation today. TE closed.         Canceled for:  Colonosc

## 2021-05-27 ENCOUNTER — OFFICE VISIT (OUTPATIENT)
Dept: OTOLARYNGOLOGY | Facility: CLINIC | Age: 71
End: 2021-05-27
Payer: MEDICARE

## 2021-05-27 VITALS
HEART RATE: 63 BPM | SYSTOLIC BLOOD PRESSURE: 156 MMHG | TEMPERATURE: 98 F | WEIGHT: 230 LBS | HEIGHT: 66 IN | BODY MASS INDEX: 36.96 KG/M2 | DIASTOLIC BLOOD PRESSURE: 83 MMHG

## 2021-05-27 DIAGNOSIS — H61.23 BILATERAL IMPACTED CERUMEN: Primary | ICD-10-CM

## 2021-05-27 PROCEDURE — 69210 REMOVE IMPACTED EAR WAX UNI: CPT | Performed by: OTOLARYNGOLOGY

## 2021-05-27 NOTE — PROGRESS NOTES
Mable Melaar is a 66year old male. Patient presents with:  Ear Wax      HISTORY OF PRESENT ILLNESS  1/29/2020  Patient presents with complaints  of hearing loss worse on the left than the right.   He actually has a little discomfort on the lef • Heart Disease Father         CAD   • Hypertension Father    • Heart Disease Mother         CAD   • Hypertension Mother        Past Medical History:   Diagnosis Date   • Amputation, finger, traumatic     partial amputation of finger.  Multiple accidents (104.3 kg)   BMI 37.12 kg/m²        Constitutional Normal Overall appearance - Normal.          Neck Exam Normal Inspection - Normal. Palpation - Normal. Parotid gland - Normal. Thyroid gland - Normal.   Eyes Normal Conjunctiva - Right: Normal, Left: Jayla Babcock 180 MG Oral Tab CR, Take 1 tablet (180 mg total) by mouth nightly., Disp: 90 tablet, Rfl: 3  •  ezetimibe (ZETIA) 10 MG Oral Tab, Take 1 tablet (10 mg total) by mouth daily. , Disp: 90 tablet, Rfl: 3  •  POTASSIUM CHLORIDE ER 10 MEQ Oral Tab CR, TAKE 1 TABL

## 2021-05-28 ENCOUNTER — LAB ENCOUNTER (OUTPATIENT)
Dept: LAB | Age: 71
End: 2021-05-28
Attending: FAMILY MEDICINE
Payer: MEDICARE

## 2021-05-28 DIAGNOSIS — R73.03 PREDIABETES: ICD-10-CM

## 2021-05-28 DIAGNOSIS — Z12.5 PROSTATE CANCER SCREENING: ICD-10-CM

## 2021-05-28 DIAGNOSIS — E78.2 MIXED HYPERLIPIDEMIA: ICD-10-CM

## 2021-05-28 PROCEDURE — 36415 COLL VENOUS BLD VENIPUNCTURE: CPT

## 2021-05-28 PROCEDURE — 80053 COMPREHEN METABOLIC PANEL: CPT

## 2021-05-28 PROCEDURE — 80061 LIPID PANEL: CPT

## 2021-05-28 PROCEDURE — 83036 HEMOGLOBIN GLYCOSYLATED A1C: CPT

## 2021-06-28 ENCOUNTER — HOSPITAL ENCOUNTER (OUTPATIENT)
Dept: ULTRASOUND IMAGING | Facility: HOSPITAL | Age: 71
Discharge: HOME | DRG: 603 | End: 2021-06-28
Attending: FAMILY MEDICINE
Payer: MEDICARE

## 2021-06-28 ENCOUNTER — OFFICE VISIT (OUTPATIENT)
Dept: FAMILY MEDICINE CLINIC | Facility: CLINIC | Age: 71
End: 2021-06-28
Payer: MEDICARE

## 2021-06-28 VITALS
HEIGHT: 67 IN | HEART RATE: 77 BPM | WEIGHT: 228 LBS | BODY MASS INDEX: 35.79 KG/M2 | DIASTOLIC BLOOD PRESSURE: 72 MMHG | SYSTOLIC BLOOD PRESSURE: 128 MMHG

## 2021-06-28 DIAGNOSIS — R60.0 HAND EDEMA: ICD-10-CM

## 2021-06-28 DIAGNOSIS — R60.0 HAND EDEMA: Primary | ICD-10-CM

## 2021-06-28 PROCEDURE — 99214 OFFICE O/P EST MOD 30 MIN: CPT | Performed by: FAMILY MEDICINE

## 2021-06-28 PROCEDURE — 93971 EXTREMITY STUDY: CPT | Performed by: FAMILY MEDICINE

## 2021-06-28 NOTE — PROGRESS NOTES
Reema Cobian is a 66year old male.   HPI:   Patient presenting with 3 days history of edema in palmar area of her right hand that extends through forearm anteriorly and minimaly posteriorly with no associated itchiness, mild discomfort rated 2 out 3 times daily as needed for gout 15 tablet 0   • HYDROcodone-acetaminophen 5-325 MG Oral Tab TAKE 1 TO 2 TABLETS BY MOUTH EVERY 4 TO 6 HOURS AS NEEDED FOR PAIN.  MAXIMUM 8 TABLETS DAILY        Past Medical History:   Diagnosis Date   • Amputation, finger, t The above note was creating using Dragon speech recognition technology. Please excuse any typos.

## 2021-06-29 ENCOUNTER — OFFICE VISIT (OUTPATIENT)
Dept: FAMILY MEDICINE CLINIC | Facility: CLINIC | Age: 71
End: 2021-06-29
Payer: MEDICARE

## 2021-06-29 ENCOUNTER — PATIENT MESSAGE (OUTPATIENT)
Dept: FAMILY MEDICINE CLINIC | Facility: CLINIC | Age: 71
End: 2021-06-29

## 2021-06-29 ENCOUNTER — TELEPHONE (OUTPATIENT)
Dept: FAMILY MEDICINE CLINIC | Facility: CLINIC | Age: 71
End: 2021-06-29

## 2021-06-29 ENCOUNTER — APPOINTMENT (OUTPATIENT)
Dept: GENERAL RADIOLOGY | Facility: HOSPITAL | Age: 71
DRG: 603 | End: 2021-06-29
Attending: EMERGENCY MEDICINE
Payer: MEDICARE

## 2021-06-29 ENCOUNTER — HOSPITAL ENCOUNTER (INPATIENT)
Facility: HOSPITAL | Age: 71
LOS: 1 days | Discharge: HOME | DRG: 603 | End: 2021-07-02
Attending: EMERGENCY MEDICINE
Payer: MEDICARE

## 2021-06-29 VITALS
HEART RATE: 59 BPM | SYSTOLIC BLOOD PRESSURE: 125 MMHG | HEIGHT: 67 IN | WEIGHT: 228 LBS | DIASTOLIC BLOOD PRESSURE: 71 MMHG | BODY MASS INDEX: 35.79 KG/M2

## 2021-06-29 DIAGNOSIS — L03.119 CELLULITIS AND ABSCESS OF HAND: Primary | ICD-10-CM

## 2021-06-29 DIAGNOSIS — M67.441 GANGLION CYST OF TENDON SHEATH OF RIGHT HAND: Primary | ICD-10-CM

## 2021-06-29 DIAGNOSIS — I89.1 LYMPHANGITIS OF UPPER EXTREMITY: ICD-10-CM

## 2021-06-29 DIAGNOSIS — L02.519 CELLULITIS AND ABSCESS OF HAND: Primary | ICD-10-CM

## 2021-06-29 LAB
ANION GAP SERPL CALC-SCNC: 8 MMOL/L (ref 0–18)
BASOPHILS # BLD AUTO: 0.03 X10(3) UL (ref 0–0.2)
BASOPHILS NFR BLD AUTO: 0.3 %
BUN BLD-MCNC: 12 MG/DL (ref 7–18)
BUN/CREAT SERPL: 14.5 (ref 10–20)
CALCIUM BLD-MCNC: 9.6 MG/DL (ref 8.5–10.1)
CHLORIDE SERPL-SCNC: 100 MMOL/L (ref 98–112)
CO2 SERPL-SCNC: 28 MMOL/L (ref 21–32)
CREAT BLD-MCNC: 0.83 MG/DL
DEPRECATED RDW RBC AUTO: 45.2 FL (ref 35.1–46.3)
EOSINOPHIL # BLD AUTO: 0.04 X10(3) UL (ref 0–0.7)
EOSINOPHIL NFR BLD AUTO: 0.4 %
ERYTHROCYTE [DISTWIDTH] IN BLOOD BY AUTOMATED COUNT: 13.1 % (ref 11–15)
GLUCOSE BLD-MCNC: 81 MG/DL (ref 70–99)
HCT VFR BLD AUTO: 42.7 %
HGB BLD-MCNC: 14.5 G/DL
IMM GRANULOCYTES # BLD AUTO: 0.03 X10(3) UL (ref 0–1)
IMM GRANULOCYTES NFR BLD: 0.3 %
LACTATE SERPL-SCNC: 1 MMOL/L (ref 0.4–2)
LYMPHOCYTES # BLD AUTO: 1.69 X10(3) UL (ref 1–4)
LYMPHOCYTES NFR BLD AUTO: 17.3 %
MCH RBC QN AUTO: 32 PG (ref 26–34)
MCHC RBC AUTO-ENTMCNC: 34 G/DL (ref 31–37)
MCV RBC AUTO: 94.3 FL
MONOCYTES # BLD AUTO: 0.73 X10(3) UL (ref 0.1–1)
MONOCYTES NFR BLD AUTO: 7.5 %
NEUTROPHILS # BLD AUTO: 7.26 X10 (3) UL (ref 1.5–7.7)
NEUTROPHILS # BLD AUTO: 7.26 X10(3) UL (ref 1.5–7.7)
NEUTROPHILS NFR BLD AUTO: 74.2 %
OSMOLALITY SERPL CALC.SUM OF ELEC: 281 MOSM/KG (ref 275–295)
PLATELET # BLD AUTO: 206 10(3)UL (ref 150–450)
POTASSIUM SERPL-SCNC: 3.6 MMOL/L (ref 3.5–5.1)
RBC # BLD AUTO: 4.53 X10(6)UL
SODIUM SERPL-SCNC: 136 MMOL/L (ref 136–145)
WBC # BLD AUTO: 9.8 X10(3) UL (ref 4–11)

## 2021-06-29 PROCEDURE — 99213 OFFICE O/P EST LOW 20 MIN: CPT | Performed by: FAMILY MEDICINE

## 2021-06-29 PROCEDURE — 99220 INITIAL OBSERVATION CARE,LEVL III: CPT | Performed by: INTERNAL MEDICINE

## 2021-06-29 PROCEDURE — 73130 X-RAY EXAM OF HAND: CPT | Performed by: EMERGENCY MEDICINE

## 2021-06-29 RX ORDER — PREDNISONE 10 MG/1
10 TABLET ORAL 3 TIMES DAILY PRN
COMMUNITY
End: 2021-07-09

## 2021-06-29 RX ORDER — CLOPIDOGREL BISULFATE 75 MG/1
75 TABLET ORAL DAILY
Status: DISCONTINUED | OUTPATIENT
Start: 2021-06-30 | End: 2021-07-02

## 2021-06-29 RX ORDER — CEPHALEXIN 500 MG/1
500 CAPSULE ORAL 4 TIMES DAILY
Qty: 20 CAPSULE | Refills: 0 | Status: ON HOLD | OUTPATIENT
Start: 2021-06-29 | End: 2021-06-29

## 2021-06-29 RX ORDER — EZETIMIBE 10 MG/1
10 TABLET ORAL DAILY
Status: DISCONTINUED | OUTPATIENT
Start: 2021-06-29 | End: 2021-07-02

## 2021-06-29 RX ORDER — HYDROCODONE BITARTRATE AND ACETAMINOPHEN 5; 325 MG/1; MG/1
1 TABLET ORAL EVERY 4 HOURS PRN
Status: DISCONTINUED | OUTPATIENT
Start: 2021-06-29 | End: 2021-07-02

## 2021-06-29 RX ORDER — SERTRALINE HYDROCHLORIDE 100 MG/1
100 TABLET, FILM COATED ORAL DAILY
Status: DISCONTINUED | OUTPATIENT
Start: 2021-06-30 | End: 2021-07-02

## 2021-06-29 RX ORDER — ROSUVASTATIN CALCIUM 10 MG/1
20 TABLET, COATED ORAL NIGHTLY
Status: DISCONTINUED | OUTPATIENT
Start: 2021-06-29 | End: 2021-07-02

## 2021-06-29 RX ORDER — HYDROCODONE BITARTRATE AND ACETAMINOPHEN 5; 325 MG/1; MG/1
1-2 TABLET ORAL EVERY 4 HOURS PRN
COMMUNITY

## 2021-06-29 RX ORDER — CEFAZOLIN SODIUM/WATER 2 G/20 ML
2 SYRINGE (ML) INTRAVENOUS ONCE
Status: COMPLETED | OUTPATIENT
Start: 2021-06-29 | End: 2021-06-29

## 2021-06-29 RX ORDER — SODIUM CHLORIDE, SODIUM LACTATE, POTASSIUM CHLORIDE, CALCIUM CHLORIDE 600; 310; 30; 20 MG/100ML; MG/100ML; MG/100ML; MG/100ML
INJECTION, SOLUTION INTRAVENOUS CONTINUOUS
Status: DISCONTINUED | OUTPATIENT
Start: 2021-06-29 | End: 2021-06-30

## 2021-06-29 RX ORDER — ACETAMINOPHEN 325 MG/1
650 TABLET ORAL EVERY 4 HOURS PRN
Status: DISCONTINUED | OUTPATIENT
Start: 2021-06-29 | End: 2021-07-02

## 2021-06-29 RX ORDER — LOSARTAN POTASSIUM 50 MG/1
50 TABLET ORAL DAILY
Status: DISCONTINUED | OUTPATIENT
Start: 2021-06-29 | End: 2021-07-02

## 2021-06-29 RX ORDER — FLUTICASONE PROPIONATE 50 MCG
1 SPRAY, SUSPENSION (ML) NASAL DAILY
Status: DISCONTINUED | OUTPATIENT
Start: 2021-06-30 | End: 2021-07-02

## 2021-06-29 RX ORDER — HEPARIN SODIUM 5000 [USP'U]/ML
5000 INJECTION, SOLUTION INTRAVENOUS; SUBCUTANEOUS EVERY 8 HOURS SCHEDULED
Status: DISCONTINUED | OUTPATIENT
Start: 2021-06-29 | End: 2021-07-02

## 2021-06-29 RX ORDER — HYDROCODONE BITARTRATE AND ACETAMINOPHEN 5; 325 MG/1; MG/1
2 TABLET ORAL EVERY 4 HOURS PRN
Status: DISCONTINUED | OUTPATIENT
Start: 2021-06-29 | End: 2021-07-02

## 2021-06-29 RX ORDER — FINASTERIDE 5 MG/1
5 TABLET, FILM COATED ORAL DAILY
Status: DISCONTINUED | OUTPATIENT
Start: 2021-06-29 | End: 2021-07-02

## 2021-06-29 RX ORDER — CEFAZOLIN SODIUM/WATER 2 G/20 ML
2 SYRINGE (ML) INTRAVENOUS EVERY 6 HOURS
Status: DISCONTINUED | OUTPATIENT
Start: 2021-06-29 | End: 2021-06-30

## 2021-06-29 NOTE — PROGRESS NOTES
Blood pressure 125/71, pulse 59, height 5' 7\" (1.702 m), weight 228 lb (103.4 kg). Patient presents today complaining of a lump in his right palm. It is warm and tender. He has red streaks coming up his arm.     Objective volar aspect right hand with

## 2021-06-29 NOTE — PROGRESS NOTES
[facility] Pharmacy Note:  Antibiotic Dose Adjustment    Cefazolin (Ancef) 1000 mg IV once was ordered for Sincere Cousin. Body mass index is 36.8 kg/m².   Wt Readings from Last 6 Encounters:  06/29/21 : 103.4 kg (228 lb)  06/29/21 : 103.4 kg (228 lb)  0

## 2021-06-29 NOTE — TELEPHONE ENCOUNTER
Advised patient's wife (on ANTONIO) of Dr. Aury Sorto note. Patient's wife verbalized understanding and stated, \"I have sent a message to also ask if prednisone could be prescribed as well as he has done well with that in the past and he has to work.  He's taking

## 2021-06-29 NOTE — TELEPHONE ENCOUNTER
Patient's spouse is requesting a call back from Dr. Mildred Yu regarding patient. She stated patient needs infection medication. Please call back soon.

## 2021-06-29 NOTE — ED PROVIDER NOTES
Patient Seen in: [facility] 4w/sw/se      History   Patient presents with:  Musculoskeletal Problem    Stated Complaint: right hand issue     HPI/Subjective:   HPI    Patient presents to the emergency department complaining of right hand pain and Use      Smoking status: Former Smoker        Packs/day: 0.00        Types: Cigarettes      Smokeless tobacco: Never Used    Vaping Use      Vaping Use: Never used    Alcohol use:  Yes      Alcohol/week: 5.0 standard drinks      Types: 5 Cans of beer per we warm and dry. Findings: No rash. Neurological:      Mental Status: He is alert and oriented to person, place, and time.                ED Course     Labs Reviewed   BASIC METABOLIC PANEL (8) - Normal   LACTIC ACID, PLASMA - Normal   CBC WITH DIFFEREN nonspecific complex cystic lesion volar soft tissues of the right hand corresponding to the area of concern.   If there are any clinical signs of infection, complex fluid collection which would include an abscess would be in the differential.  This  lesion

## 2021-06-29 NOTE — PROGRESS NOTES
[facility] Pharmacy Note:  Renal Adjustment for cefazolin (ANCEF)    Lisa Moreno is a 66year old patient who has been prescribed cefazolin (ANCEF) 1 mg every 8 hrs. The estimated creatinine clearance is 71.5 mL/min (based on SCr of 0.83 mg/dL).  The do

## 2021-06-29 NOTE — ED INITIAL ASSESSMENT (HPI)
Pt sent here by Primary Doctor, pt present to ER complaining of of a lump in his right palm, tender and warm.   Diagnosed with infected sebaceous cyst.

## 2021-06-29 NOTE — H&P
[address] Patient Status:  Observation      66year old [facility] [id number]   Location 454/454-A Attending Tianna Wang MD     PCP Paul Martinez.  DO Shyam         DATE OF ADMISSIO REPLACEMENT SURGERY Right 2013    right hip replacement   • KNEE REPLACEMENT SURGERY Right 2012    right knee replacement   • KNEE REPLACEMENT SURGERY Left 2013    left knee replacement   • TOTAL HIP REPLACEMENT     • TOTAL KNEE REPLACEMENT         [employer] Diagnoses:Essential hypertension with goal blood pressure less than 140/90    ezetimibe (ZETIA) 10 MG Oral Tab  Take 1 tablet (10 mg total) by mouth daily.   Qty: 90 tablet Refills: 3    POTASSIUM CHLORIDE ER 10 MEQ Oral Tab CR  TAKE 1 TABLET(10 MEQ) BY EDWIN distress. HEENT: Normocephalic. Extraocular muscles were intact. PERRL. NECK:  Supple. Trach midline  CHEST:  Symmetrical movement on inspiration  HEART:  S1 and S2 heard. RRR   LUNGS:  Air entry was mildly decreased.   No crackles or wheezes   ABDOMEN evaluated further with nonemergent MRI of the right hand without with contrast.     Dictated by (SHIRIN): Chasity Mcadams MD on 6/28/2021 at 6:55 PM     Finalized by (SHIRIN): Chasity Mcadams MD on 6/28/2021 at 7:00 PM            Data:  Recent Labs   Lab 06/

## 2021-06-29 NOTE — TELEPHONE ENCOUNTER
Inform patient and/or wife I was waiting for final report of the ultrasound, results confirm no clot but  that symptoms do appear to be related to the cyst in his hand, I have sent an antibiotic course for 5 days, he should be seen by his PCP next week aft

## 2021-06-30 LAB
ANION GAP SERPL CALC-SCNC: 6 MMOL/L (ref 0–18)
BASOPHILS # BLD AUTO: 0.01 X10(3) UL (ref 0–0.2)
BASOPHILS NFR BLD AUTO: 0.1 %
BUN BLD-MCNC: 12 MG/DL (ref 7–18)
BUN/CREAT SERPL: 14 (ref 10–20)
CALCIUM BLD-MCNC: 8.7 MG/DL (ref 8.5–10.1)
CHLORIDE SERPL-SCNC: 103 MMOL/L (ref 98–112)
CO2 SERPL-SCNC: 29 MMOL/L (ref 21–32)
CREAT BLD-MCNC: 0.86 MG/DL
DEPRECATED RDW RBC AUTO: 47.2 FL (ref 35.1–46.3)
EOSINOPHIL # BLD AUTO: 0.03 X10(3) UL (ref 0–0.7)
EOSINOPHIL NFR BLD AUTO: 0.4 %
ERYTHROCYTE [DISTWIDTH] IN BLOOD BY AUTOMATED COUNT: 13.2 % (ref 11–15)
GLUCOSE BLD-MCNC: 122 MG/DL (ref 70–99)
HAV IGM SER QL: 2 MG/DL (ref 1.6–2.6)
HCT VFR BLD AUTO: 39.2 %
HGB BLD-MCNC: 13 G/DL
IMM GRANULOCYTES # BLD AUTO: 0.02 X10(3) UL (ref 0–1)
IMM GRANULOCYTES NFR BLD: 0.3 %
INR BLD: 1.03 (ref 0.9–1.2)
LYMPHOCYTES # BLD AUTO: 1.29 X10(3) UL (ref 1–4)
LYMPHOCYTES NFR BLD AUTO: 16.1 %
MCH RBC QN AUTO: 32 PG (ref 26–34)
MCHC RBC AUTO-ENTMCNC: 33.2 G/DL (ref 31–37)
MCV RBC AUTO: 96.6 FL
MONOCYTES # BLD AUTO: 0.65 X10(3) UL (ref 0.1–1)
MONOCYTES NFR BLD AUTO: 8.1 %
NEUTROPHILS # BLD AUTO: 6 X10 (3) UL (ref 1.5–7.7)
NEUTROPHILS # BLD AUTO: 6 X10(3) UL (ref 1.5–7.7)
NEUTROPHILS NFR BLD AUTO: 75 %
OSMOLALITY SERPL CALC.SUM OF ELEC: 287 MOSM/KG (ref 275–295)
PLATELET # BLD AUTO: 193 10(3)UL (ref 150–450)
POTASSIUM SERPL-SCNC: 3.7 MMOL/L (ref 3.5–5.1)
PROTHROMBIN TIME: 13.3 SECONDS (ref 11.8–14.5)
RBC # BLD AUTO: 4.06 X10(6)UL
SODIUM SERPL-SCNC: 138 MMOL/L (ref 136–145)
WBC # BLD AUTO: 8 X10(3) UL (ref 4–11)

## 2021-06-30 PROCEDURE — 99226 SUBSEQUENT OBSERVATION CARE: CPT | Performed by: HOSPITALIST

## 2021-06-30 PROCEDURE — 0H9FXZZ DRAINAGE OF RIGHT HAND SKIN, EXTERNAL APPROACH: ICD-10-PCS | Performed by: ORTHOPAEDIC SURGERY

## 2021-06-30 RX ORDER — VANCOMYCIN 2 GRAM/500 ML IN 0.9 % SODIUM CHLORIDE INTRAVENOUS
25 ONCE
Status: COMPLETED | OUTPATIENT
Start: 2021-06-30 | End: 2021-07-01

## 2021-06-30 RX ORDER — SODIUM CHLORIDE 9 MG/ML
INJECTION, SOLUTION INTRAVENOUS CONTINUOUS
Status: DISCONTINUED | OUTPATIENT
Start: 2021-06-30 | End: 2021-07-02

## 2021-06-30 RX ORDER — CEFAZOLIN SODIUM/WATER 2 G/20 ML
2 SYRINGE (ML) INTRAVENOUS EVERY 8 HOURS
Status: DISCONTINUED | OUTPATIENT
Start: 2021-06-30 | End: 2021-06-30

## 2021-06-30 NOTE — OCCUPATIONAL THERAPY NOTE
OT orders rcvd; patient is here for cystic lesion of R hand; hx of ganglion cyst; still pending consult from hand surgeon; possible I&D today; discussed OT orders with RN- patient is able to ambulate and complete self care on his own; he is able to self fe

## 2021-06-30 NOTE — PROGRESS NOTES
[facility]    Progress Note    Donna Arreola Patient Status:  Observation    1950 MRN [id number]   Location [facility] 4W/SW/SE Attending Ariadna Nunes, [address] [facility] Day # 0 PCP Son Rodriguez.  Shyam,        Subjective: (Nonah Bulla) 5-325 MG per tab 2 tablet, 2 tablet, Oral, Q4H PRN        Lab Results   Component Value Date    WBC 8.0 06/30/2021    HGB 13.0 06/30/2021    HCT 39.2 06/30/2021    .0 06/30/2021    CREATSERUM 0.86 06/30/2021    BUN 12 06/30/2021     06 the differential.  This  lesion can be evaluated further with nonemergent MRI of the right hand without with contrast.     Dictated by (CST): Nelly Barroso MD on 6/28/2021 at 6:55 PM     Finalized by (CST): Nelly Barroos MD on 6/28/2021 at 7:00 PM

## 2021-06-30 NOTE — PLAN OF CARE
Pt s/p Incision and Drainage at the bedside. Tolerated procedure well. Norco for pain. IV zosyn and Vanco started. Pt resting comfortably in bed. VSS.  Will ctm       Problem: SKIN/TISSUE INTEGRITY - ADULT  Goal: Incision(s), wounds(s) or drain site(s) hea assessment.  - Educate pt/family on patient safety including physical limitations  - Instruct pt to call for assistance with activity based on assessment  - Modify environment to reduce risk of injury  - Provide assistive devices as appropriate  - Consider

## 2021-06-30 NOTE — PROGRESS NOTES
[facility] Dosing Service    Initial Pharmacokinetic Consult for Vancomycin AUC Dosing    Rica Martin is a 66year old patient who is being treated for cellulitis. Pharmacy has been asked to dose vancomycin by Dr Serena Meza.     Weights:  Ideal bod

## 2021-06-30 NOTE — PROGRESS NOTES
[facility] Pharmacy Note:  Renal Adjustment for cefazolin (ANCEF)    Mabel Melara is a 66year old patient who has been prescribed cefazolin (ANCEF) 2000 mg every 6 hrs. The estimated creatinine clearance is 69 mL/min (based on SCr of 0.86 mg/dL).  The d

## 2021-06-30 NOTE — PLAN OF CARE
Uneventful night. Pt A/Ox4. VSS on RA. Afebrile. +GARCIA with ambulation, pt reports this is chronic. NPO at 0000 for possible I&D later today. Rt hand swollen w/ hard cyst on palm; redness still extends to arm. Receiving Ancef q6h.  PRN Merrill given for [employer] request assistance  - Assess pain using appropriate pain scale  - Administer analgesics based on type and severity of pain and evaluate response  - Implement non-pharmacological measures as appropriate and evaluate response  - Consider cultural and social of patient/family/discharge partner  - Complete POLST form as appropriate  - Assess patient's ability to be responsible for managing their own health  - Refer to Case Management Department for coordinating discharge planning if the patient needs post-hospi

## 2021-06-30 NOTE — PROGRESS NOTES
[facility] Pharmacy Consult Note:  Antibiotic Dosing    Pharmacy was consulted to dose Piperacillin/tazobactam (Zosyn) for treatment of cellulitis by Dr. Serena Meza. Body mass index is 36.8 kg/m².   Wt Readings from Last 6 Encounters:  06/29/21 : 103.4 kg (228 lb)

## 2021-06-30 NOTE — RESPIRATORY THERAPY NOTE
Pt does not have a diagnosis of SMITH and does not use a cpap at home. Pt declined using cpap for this admission.

## 2021-07-01 LAB
ANION GAP SERPL CALC-SCNC: 8 MMOL/L (ref 0–18)
BASOPHILS # BLD AUTO: 0.02 X10(3) UL (ref 0–0.2)
BASOPHILS NFR BLD AUTO: 0.2 %
BUN BLD-MCNC: 8 MG/DL (ref 7–18)
BUN/CREAT SERPL: 13.3 (ref 10–20)
CALCIUM BLD-MCNC: 7.8 MG/DL (ref 8.5–10.1)
CHLORIDE SERPL-SCNC: 108 MMOL/L (ref 98–112)
CO2 SERPL-SCNC: 25 MMOL/L (ref 21–32)
CREAT BLD-MCNC: 0.6 MG/DL
DEPRECATED RDW RBC AUTO: 48.9 FL (ref 35.1–46.3)
EOSINOPHIL # BLD AUTO: 0.03 X10(3) UL (ref 0–0.7)
EOSINOPHIL NFR BLD AUTO: 0.4 %
ERYTHROCYTE [DISTWIDTH] IN BLOOD BY AUTOMATED COUNT: 13.1 % (ref 11–15)
GLUCOSE BLD-MCNC: 142 MG/DL (ref 70–99)
HCT VFR BLD AUTO: 37.7 %
HGB BLD-MCNC: 12 G/DL
IMM GRANULOCYTES # BLD AUTO: 0.03 X10(3) UL (ref 0–1)
IMM GRANULOCYTES NFR BLD: 0.4 %
LYMPHOCYTES # BLD AUTO: 1.29 X10(3) UL (ref 1–4)
LYMPHOCYTES NFR BLD AUTO: 15.4 %
MCH RBC QN AUTO: 31.9 PG (ref 26–34)
MCHC RBC AUTO-ENTMCNC: 31.8 G/DL (ref 31–37)
MCV RBC AUTO: 100.3 FL
MONOCYTES # BLD AUTO: 0.6 X10(3) UL (ref 0.1–1)
MONOCYTES NFR BLD AUTO: 7.2 %
NEUTROPHILS # BLD AUTO: 6.39 X10 (3) UL (ref 1.5–7.7)
NEUTROPHILS # BLD AUTO: 6.39 X10(3) UL (ref 1.5–7.7)
NEUTROPHILS NFR BLD AUTO: 76.4 %
OSMOLALITY SERPL CALC.SUM OF ELEC: 293 MOSM/KG (ref 275–295)
PLATELET # BLD AUTO: 168 10(3)UL (ref 150–450)
POTASSIUM SERPL-SCNC: 3.3 MMOL/L (ref 3.5–5.1)
RBC # BLD AUTO: 3.76 X10(6)UL
SODIUM SERPL-SCNC: 141 MMOL/L (ref 136–145)
WBC # BLD AUTO: 8.4 X10(3) UL (ref 4–11)

## 2021-07-01 PROCEDURE — 99233 SBSQ HOSP IP/OBS HIGH 50: CPT | Performed by: HOSPITALIST

## 2021-07-01 RX ORDER — POTASSIUM CHLORIDE 20 MEQ/1
40 TABLET, EXTENDED RELEASE ORAL ONCE
Status: COMPLETED | OUTPATIENT
Start: 2021-07-01 | End: 2021-07-01

## 2021-07-01 NOTE — PROGRESS NOTES
[facility]    Progress Note    Sincere Cousin Patient Status:  Observation    1950 MRN [id number]   Location [facility] 4W/SW/SE Attending Ever Brady, [address] [facility] Day # 0 PCP Christophe Howe,        Subjective: Oral, Q4H PRN   Or  HYDROcodone-acetaminophen (NORCO) 5-325 MG per tab 1 tablet, 1 tablet, Oral, Q4H PRN   Or  HYDROcodone-acetaminophen (NORCO) 5-325 MG per tab 2 tablet, 2 tablet, Oral, Q4H PRN        Lab Results   Component Value Date    WBC 8.4 07/01/2 as needed     dvt ppx lovenox     Paula Hunt,   >35 min spent with patient. > 50% time was spent counseling patient, discussing plan of care, discussing labs and imaging findings. Spoke with consultant. All questions answered.

## 2021-07-01 NOTE — OPERATIVE REPORT
[facility]    PATIENT'S NAME: Thor Ryder CRONIN   ATTENDING PHYSICIAN: Valeria Scott DO   OPERATING PHYSICIAN: Zhou Fitzgerald.  Padma Mckoy MD   PATIENT ACCOUNT#:   [id number]    LOCATION:  [address] [place] [address] #:   [id number]       DATE Buel Sprain tolerated the procedure well. The Estimated blood loss was 1 mL. There was no drain, but the wound was left open. Specimens included cultures. There were no complications.   Patient was awake throughout the procedure and remained inside his bed in stabl

## 2021-07-01 NOTE — PLAN OF CARE
Pt s/p I&D day 1 doing well. Reports minimal pain and improvement in hand ROM. ID consulted to follow up on IV abx treatment.  Dr. Landrum Shows saw pt today and states he is not concerned that the infection is getting worse, however, due to possible muscle inv injury  Description: INTERVENTIONS:  - Assess pt frequently for physical needs  - Identify cognitive and physical deficits and behaviors that affect risk of falls.   - Roberta fall precautions as indicated by assessment.  - Educate pt/family on patient sa

## 2021-07-01 NOTE — CONSULTS
[place] [facility] ID CONSULT NOTE    Aditi [facility] Patient Status:  Observation    1950 MRN [id number]   Location [facility] 4W/SW/SE Attending Jose Miguel Shah, [address] [facility] Day # 0 DAISY Colon.  DO Shyam REPLACEMENT SURGERY Right 2013    right hip replacement   • KNEE REPLACEMENT SURGERY Right 2012    right knee replacement   • KNEE REPLACEMENT SURGERY Left 2013    left knee replacement   • TOTAL HIP REPLACEMENT     • TOTAL KNEE REPLACEMENT       Family Hi CONSTITUTIONAL:  No weight loss, weakness or fatigue. HEENT:  Eyes:  No visual loss, blurred vision, double vision or yellow sclerae. Ears, Nose, Throat:  No hearing loss, sneezing, congestion, runny nose or sore throat. SKIN:  No rash or itching.   CARDI finger amputations due to accident, colon cancer, HTN, who presented to [address] [place] [address] on 6/29 with worsening hand swelling that started two days prior to admission. States that he has had a ganglion cyst and that he noticed more warmth and pain.  Patient was seen by

## 2021-07-01 NOTE — OCCUPATIONAL THERAPY NOTE
OCCUPATIONAL THERAPY QUICK EVALUATION - INPATIENT    Room Number: 454/454-A  Evaluation Date: 7/1/2021     Type of Evaluation: Initial       Physician Order: IP Consult to Occupational Therapy  Reason for Therapy:  ADL/IADL Dysfunction and Discharge Planni Family    Prior Level of Function: Independnet     SUBJECTIVE  I have been pumping my hand like this    OBJECTIVE  Precautions: Limb alert - right  Fall Risk: Standard fall risk    WEIGHT BEARING RESTRICTION  Weight Bearing Restriction: None [facility]. Patient demonstrated good understanding of education provided and is able to manage basic self care at [facility] level; he will have support from family at home for d/c; discussed if change in status occurs, OT can come back and reassess.  Patient agree

## 2021-07-01 NOTE — H&P
[facility]    PATIENT'S NAME: Dione Maedra DANIKADON   ATTENDING PHYSICIAN: Adarsh Redding DO   PATIENT ACCOUNT#:   [de-identified]    LOCATION:  [address] [place], [place] [address] [address] RECORD #:   [id number]       YOB: 1950  ADMISSION DATE:       0 count is normal.    X-rays are within normal limits for age. IMPRESSION:  Abscess with cellulitis of the right upper extremity. RECOMMENDATIONS:  I discussed with the patient the findings of his exam.  Treatment options were discussed.   Recommendatio

## 2021-07-02 VITALS
RESPIRATION RATE: 18 BRPM | HEIGHT: 66 IN | DIASTOLIC BLOOD PRESSURE: 78 MMHG | SYSTOLIC BLOOD PRESSURE: 147 MMHG | BODY MASS INDEX: 36.64 KG/M2 | TEMPERATURE: 98 F | OXYGEN SATURATION: 99 % | HEART RATE: 59 BPM | WEIGHT: 228 LBS

## 2021-07-02 LAB
POTASSIUM SERPL-SCNC: 3.9 MMOL/L (ref 3.5–5.1)
VANCOMYCIN PEAK SERPL-MCNC: 24.2 UG/ML (ref 30–50)

## 2021-07-02 PROCEDURE — 99239 HOSP IP/OBS DSCHRG MGMT >30: CPT | Performed by: HOSPITALIST

## 2021-07-02 RX ORDER — CIPROFLOXACIN 500 MG/1
500 TABLET, FILM COATED ORAL 2 TIMES DAILY
Qty: 20 TABLET | Refills: 0 | Status: SHIPPED | OUTPATIENT
Start: 2021-07-02 | End: 2021-07-09

## 2021-07-02 RX ORDER — CEFADROXIL 1000 MG/1
1 TABLET ORAL 2 TIMES DAILY
Qty: 20 TABLET | Refills: 0 | Status: SHIPPED | OUTPATIENT
Start: 2021-07-02 | End: 2021-07-12

## 2021-07-02 RX ORDER — LORAZEPAM 2 MG/ML
1 INJECTION INTRAMUSCULAR EVERY 4 HOURS PRN
Status: DISCONTINUED | OUTPATIENT
Start: 2021-07-02 | End: 2021-07-02

## 2021-07-02 NOTE — PROGRESS NOTES
INFECTIOUS DISEASE PROGRESS NOTE  [facility]    Indy Brock Patient Status:  Inpatient    1950 MRN [id number]   Location [facility] 4W/SW/SE Attending Darryn Pollock, [address] Day # 1 Lymphangitis of upper extremity      ASSESSMENT:    Antibiotics: Vancomycin, zosyn     66year old male with a history of previous partial finger amputations due to accident, colon cancer, HTN, who presented to [address] [place] [address] on 6/29 with worsening hand swelling that

## 2021-07-02 NOTE — CM/SW NOTE
GIORGIO notified by Delma Palafox [phone] of  [facility] that patient will likely dc with IV abx pending final ID rx. Patient covered at 100% for in office/teach and train/home infusion. GIORGIO sent tentative referral via aidin to [facility] and home health agencies.  Need to

## 2021-07-02 NOTE — PLAN OF CARE
No acute events overnight. Dressing to right hand CDI. Zosyn and Vancomycin given. VSS, afebrile. Up with SBA to bathroom. Voiding well. Safety plan in place. Awaiting culture results for final antibiotic plan.      Problem: Patient Centered Care  Goal: Kristopher Brandon Implement non-pharmacological measures as appropriate and evaluate response  - Consider cultural and social influences on pain and pain management  - Manage/alleviate anxiety  - Utilize distraction and/or relaxation techniques  - Monitor for opioid side ef health  - Refer to Case Management Department for coordinating discharge planning if the patient needs post-hospital services based on physician/LIP order or complex needs related to functional status, cognitive ability or social support system  Outcome: P

## 2021-07-02 NOTE — PLAN OF CARE
Problem: Patient Centered Care  Goal: Patient preferences are identified and integrated in the patient's plan of care  Description: Interventions:  - What would you like us to know as we care for you?   - Provide timely, complete, and accurate informatio period  Description: INTERVENTIONS  - Monitor WBC  - Administer growth factors as ordered  - Implement neutropenic guidelines  Outcome: Completed     Problem: SAFETY ADULT - FALL  Goal: Free from fall injury  Description: INTERVENTIONS:  - Assess pt freque

## 2021-07-06 ENCOUNTER — TELEPHONE (OUTPATIENT)
Dept: FAMILY MEDICINE CLINIC | Facility: CLINIC | Age: 71
End: 2021-07-06

## 2021-07-06 ENCOUNTER — PATIENT OUTREACH (OUTPATIENT)
Dept: CASE MANAGEMENT | Age: 71
End: 2021-07-06

## 2021-07-06 DIAGNOSIS — Z02.9 ENCOUNTERS FOR UNSPECIFIED ADMINISTRATIVE PURPOSE: ICD-10-CM

## 2021-07-06 NOTE — DISCHARGE SUMMARY
[facility]    Discharge Summary    Nenita Malloy Patient Status:  Inpatient    1950 MRN [id number]   Location [facility] 4W/SW/SE Attending No att. providers found   [facility] Day # 1 PCP Maribel Garnica.  Shyam,      Date of non-tender; bowel sounds normal; no masses,  no organomegaly  Extremities: extremities normal, atraumatic, no cyanosis or edema  Psychiatric: calm        History of Present Illness:   HISTORY OF PRESENT ILLNESS  This is a 66year oldmale who presented comp Stop taking on: July 12, 2021  Quantity: 20 tablet  Refills: 0     Ciprofloxacin HCl 500 MG Tabs  Commonly known as: CIPRO      Take 1 tablet (500 mg total) by mouth 2 (two) times daily for 10 days.    Stop taking on: July 12, 2021  Quantity: 20 tablet  Ref Calcium 20 MG Tabs  Commonly known as: CRESTOR      Take 1 tablet (20 mg total) by mouth nightly. Quantity: 90 tablet  Refills: 3     Sertraline HCl 100 MG Tabs  Commonly known as: ZOLOFT      Take 1 tablet (100 mg total) by mouth daily.    Quantity: 90 t

## 2021-07-06 NOTE — PROGRESS NOTES
NCM placed call to patient for TCM, LM requesting a call to [phone] back with a condition update.

## 2021-07-06 NOTE — TELEPHONE ENCOUNTER
Attempted to reach patient for TCM call, had to leave a message requesting a call back. TCM/HFU appt recommended by 7/9/2021 as pt is at High risk for readmission.       Patient was discharged from [facility] on 7/2/2021 and scheduled a HFU via My Ch

## 2021-07-09 ENCOUNTER — OFFICE VISIT (OUTPATIENT)
Dept: FAMILY MEDICINE CLINIC | Facility: CLINIC | Age: 71
End: 2021-07-09
Payer: MEDICARE

## 2021-07-09 VITALS
WEIGHT: 224 LBS | HEIGHT: 66 IN | SYSTOLIC BLOOD PRESSURE: 142 MMHG | RESPIRATION RATE: 18 BRPM | BODY MASS INDEX: 36 KG/M2 | HEART RATE: 68 BPM | DIASTOLIC BLOOD PRESSURE: 78 MMHG

## 2021-07-09 DIAGNOSIS — I25.118 CORONARY ARTERY DISEASE INVOLVING NATIVE CORONARY ARTERY OF NATIVE HEART WITH OTHER FORM OF ANGINA PECTORIS (HCC): ICD-10-CM

## 2021-07-09 DIAGNOSIS — R60.0 HAND EDEMA: ICD-10-CM

## 2021-07-09 DIAGNOSIS — M79.641 PAIN OF RIGHT HAND: Primary | ICD-10-CM

## 2021-07-09 DIAGNOSIS — M10.00 ACUTE IDIOPATHIC GOUT, UNSPECIFIED SITE: ICD-10-CM

## 2021-07-09 PROCEDURE — 1111F DSCHRG MED/CURRENT MED MERGE: CPT | Performed by: FAMILY MEDICINE

## 2021-07-09 PROCEDURE — 99495 TRANSJ CARE MGMT MOD F2F 14D: CPT | Performed by: FAMILY MEDICINE

## 2021-07-09 RX ORDER — CEPHALEXIN 500 MG/1
CAPSULE ORAL
COMMUNITY
Start: 2021-06-29

## 2021-07-09 RX ORDER — PREDNISONE 10 MG/1
10 TABLET ORAL 3 TIMES DAILY PRN
Qty: 15 TABLET | Refills: 0 | Status: SHIPPED | OUTPATIENT
Start: 2021-07-09

## 2021-07-09 NOTE — PROGRESS NOTES
HPI:    Isabell Dumas is a 66year old male here today for hospital follow up.    He was discharged from Inpatient hospital, [facility]  to Home   Admission Date: 6/29/21   Discharge Date: 7/2/21  Hospital Discharge Diagnoses (since 6/9/2021) FORMULA) Oral Tab, Take by mouth. finasteride 5 MG Oral Tab, Take 1 tablet (5 mg total) by mouth once daily. Sertraline HCl 100 MG Oral Tab, Take 1 tablet (100 mg total) by mouth daily.   Clopidogrel Bisulfate 75 MG Oral Tab, TAKE 1 TABLET(75 MG) BY MOUTH Review of Systems    PHYSICAL EXAM:   No LMP for male patient. Estimated body mass index is 36.15 kg/m² as calculated from the following:    Height as of this encounter: 5' 6\" (1.676 m). Weight as of this encounter: 224 lb (101.6 kg).    /78   P edema  As above    3. Acute idiopathic gout, unspecified site  Prednisone prescription    4.  Coronary artery disease involving native coronary artery of native heart with other form of angina pectoris ([address]  On clopidogrel also prednisone prescribed instea

## 2021-07-19 DIAGNOSIS — I10 ESSENTIAL HYPERTENSION: ICD-10-CM

## 2021-07-19 DIAGNOSIS — F43.10 PTSD (POST-TRAUMATIC STRESS DISORDER): ICD-10-CM

## 2021-07-19 RX ORDER — SERTRALINE HYDROCHLORIDE 100 MG/1
TABLET, FILM COATED ORAL
Qty: 90 TABLET | Refills: 1 | Status: SHIPPED | OUTPATIENT
Start: 2021-07-19 | End: 2021-10-21

## 2021-09-04 ENCOUNTER — PATIENT MESSAGE (OUTPATIENT)
Dept: FAMILY MEDICINE CLINIC | Facility: CLINIC | Age: 71
End: 2021-09-04

## 2021-09-05 NOTE — TELEPHONE ENCOUNTER
Rx was denied by Pt's Cardiologist due to needing appointment. Rx was prescribed by another provider. Pt is out of meds. Rx pended. Please advise.   LOV 7/9/21

## 2021-09-05 NOTE — TELEPHONE ENCOUNTER
From: Tasneem Weston  To: Jayashree Howe DO  Sent: 9/4/2021 8:18 PM CDT  Subject: Prescription Question    I am out of EZETIMIBE 10 MG Oral Tab [Pharmacy Med Name: EZETIMIBE 10MG TABLETS] and the refill is being denied. I don't know why!

## 2021-09-07 RX ORDER — EZETIMIBE 10 MG/1
10 TABLET ORAL DAILY
Qty: 90 TABLET | Refills: 0 | Status: SHIPPED | OUTPATIENT
Start: 2021-09-07 | End: 2021-11-29

## 2021-09-22 ENCOUNTER — TELEPHONE (OUTPATIENT)
Dept: PULMONOLOGY | Facility: CLINIC | Age: 71
End: 2021-09-22

## 2021-10-07 ENCOUNTER — HOSPITAL ENCOUNTER (OUTPATIENT)
Dept: CT IMAGING | Age: 71
Discharge: HOME | End: 2021-10-07
Attending: INTERNAL MEDICINE
Payer: MEDICARE

## 2021-10-07 DIAGNOSIS — R91.1 LUNG NODULE: ICD-10-CM

## 2021-10-07 PROCEDURE — 71250 CT THORAX DX C-: CPT | Performed by: INTERNAL MEDICINE

## 2021-10-21 DIAGNOSIS — I10 ESSENTIAL HYPERTENSION: ICD-10-CM

## 2021-10-21 DIAGNOSIS — F43.10 PTSD (POST-TRAUMATIC STRESS DISORDER): ICD-10-CM

## 2021-10-21 RX ORDER — SERTRALINE HYDROCHLORIDE 100 MG/1
100 TABLET, FILM COATED ORAL DAILY
Qty: 90 TABLET | Refills: 1 | Status: SHIPPED | OUTPATIENT
Start: 2021-10-21 | End: 2022-01-24

## 2021-10-21 NOTE — TELEPHONE ENCOUNTER
Refill passed per [facility] protocol.   Requested Prescriptions   Pending Prescriptions Disp Refills    SERTRALINE 100 MG Oral Tab [Pharmacy Med Name: SERTRALINE 100MG TABLETS] 90 tablet 1     Sig: TAKE 1 TABLET(100 MG) BY MOUTH DAILY        Psychiatric Non-Scheduled (Anti-Anxiety) Passed - 10/21/2021  8:01 AM        Passed - Appointment in last 6 or next 3 months               Recent Outpatient Visits              3 months ago Pain of right hand    [address], DO    Office Visit    3 months ago Ganglion cyst of tendon sheath of right hand    [address], DO    Office Visit    3 months ago Hand edema    [address] [facility] Lily Mcmillan MD    Office Visit    4 months ago Personal history of colon cancer    Gastoenterology - Anny Irizarry, Laura    Nurse Only    4 months ago     Gastoenterology - [address], Laura    Nurse Only            Future Appointments         Provider Department Appt Notes    In 1 month Ellen Reed MD Cardiology - [facility] Due for checkup    In 2 months Kevin Garcia MD [place], [facility], [address], [place] Check breathing

## 2021-11-07 ENCOUNTER — HOSPITAL ENCOUNTER (OUTPATIENT)
Age: 71
Discharge: HOME | End: 2021-11-07
Attending: EMERGENCY MEDICINE | Admitting: EMERGENCY MEDICINE
Payer: MEDICARE

## 2021-11-07 ENCOUNTER — APPOINTMENT (OUTPATIENT)
Dept: GENERAL RADIOLOGY | Age: 71
End: 2021-11-07
Attending: EMERGENCY MEDICINE
Payer: MEDICARE

## 2021-11-07 VITALS
TEMPERATURE: 98 F | HEART RATE: 70 BPM | DIASTOLIC BLOOD PRESSURE: 84 MMHG | RESPIRATION RATE: 20 BRPM | SYSTOLIC BLOOD PRESSURE: 148 MMHG | OXYGEN SATURATION: 99 %

## 2021-11-07 DIAGNOSIS — T07.XXXA MULTIPLE ABRASIONS: ICD-10-CM

## 2021-11-07 DIAGNOSIS — S63.124A DISLOCATION OF INTERPHALANGEAL JOINT OF RIGHT THUMB, INITIAL ENCOUNTER: Primary | ICD-10-CM

## 2021-11-07 PROCEDURE — 26770 TREAT FINGER DISLOCATION: CPT

## 2021-11-07 PROCEDURE — 99213 OFFICE O/P EST LOW 20 MIN: CPT

## 2021-11-07 PROCEDURE — 73140 X-RAY EXAM OF FINGER(S): CPT | Performed by: EMERGENCY MEDICINE

## 2021-11-07 NOTE — ED INITIAL ASSESSMENT (HPI)
Pt c/o pain and swelling on right thumb along with multiple abrasions due to a fall this morning at patient's back yard.  Denies LOC

## 2021-11-07 NOTE — ED PROVIDER NOTES
Patient Seen in: [facility] [place]      History   Patient presents with:  Arm or Hand Injury    Stated Complaint: Right hand thumb, may be broken, and right hand pinky needs to be looked at wit*    Subjective:   HPI    The patient is a 64-year-old m replacement   • KNEE REPLACEMENT SURGERY Right 2012    right knee replacement   • KNEE REPLACEMENT SURGERY Left 2013    left knee replacement   • TOTAL HIP REPLACEMENT     • TOTAL KNEE REPLACEMENT                  No pertinent social history.             Re Patient's x-ray results were discussed with the patient and his family. Will place in finger splint, follow-up with hand, .     MDM      Right thumb IP joint dislocation versus fracture dislocation                             Disposition and P

## 2021-11-29 RX ORDER — EZETIMIBE 10 MG/1
10 TABLET ORAL DAILY
Qty: 90 TABLET | Refills: 3 | Status: SHIPPED | OUTPATIENT
Start: 2021-11-29 | End: 2022-11-15

## 2021-12-09 RX ORDER — FINASTERIDE 5 MG/1
5 TABLET, FILM COATED ORAL DAILY
Qty: 90 TABLET | Refills: 3 | Status: SHIPPED | OUTPATIENT
Start: 2021-12-09 | End: 2023-05-22

## 2021-12-09 NOTE — TELEPHONE ENCOUNTER
Please review; protocol failed/no protocol.      Requested Prescriptions   Pending Prescriptions Disp Refills    FINASTERIDE 5 MG Oral Tab [Pharmacy Med Name: FINASTERIDE 5MG TABLETS] 90 tablet 3     Sig: TAKE 1 TABLET(5 MG) BY MOUTH EVERY DAY        There is no refill protocol information for this order            Recent Outpatient Visits              2 days ago Mixed hyperlipidemia    Cardiology - Orly Lopez MD    Office Visit    5 months ago Pain of right hand    [address], Darien Morales, DO    Office Visit    5 months ago Ganglion cyst of tendon sheath of right hand    [address], DO    Office Visit    5 months ago Hand edema    [address] [facility] Leif Hodges MD    Office Visit    5 months ago Personal history of colon cancer    Gastoenterology - Anny Irizarry, Nida [place]    Nurse Only             Future Appointments         Provider Department Appt Notes    In 4 weeks Brandy Richardson MD [facility], [address], [place] Check breathing

## 2022-01-07 ENCOUNTER — OFFICE VISIT (OUTPATIENT)
Dept: PULMONOLOGY | Facility: CLINIC | Age: 72
End: 2022-01-07
Payer: MEDICARE

## 2022-01-07 VITALS
SYSTOLIC BLOOD PRESSURE: 133 MMHG | WEIGHT: 226 LBS | RESPIRATION RATE: 16 BRPM | HEART RATE: 60 BPM | OXYGEN SATURATION: 100 % | DIASTOLIC BLOOD PRESSURE: 79 MMHG | BODY MASS INDEX: 36.32 KG/M2 | HEIGHT: 66 IN

## 2022-01-07 DIAGNOSIS — J44.1 CHRONIC OBSTRUCTIVE PULMONARY DISEASE WITH ACUTE EXACERBATION (HCC): Primary | ICD-10-CM

## 2022-01-07 PROCEDURE — 99214 OFFICE O/P EST MOD 30 MIN: CPT | Performed by: INTERNAL MEDICINE

## 2022-01-07 RX ORDER — PREDNISONE 10 MG/1
TABLET ORAL
Qty: 18 TABLET | Refills: 0 | Status: SHIPPED | OUTPATIENT
Start: 2022-01-07

## 2022-01-07 RX ORDER — ALBUTEROL SULFATE 90 UG/1
2 AEROSOL, METERED RESPIRATORY (INHALATION) EVERY 6 HOURS PRN
Qty: 1 EACH | Refills: 3 | Status: SHIPPED | OUTPATIENT
Start: 2022-01-07

## 2022-01-07 RX ORDER — BUDESONIDE AND FORMOTEROL FUMARATE DIHYDRATE 160; 4.5 UG/1; UG/1
2 AEROSOL RESPIRATORY (INHALATION) 2 TIMES DAILY
Qty: 1 EACH | Refills: 2 | Status: SHIPPED | OUTPATIENT
Start: 2022-01-07 | End: 2023-01-07

## 2022-01-07 NOTE — PROGRESS NOTES
Subjective:   Patient ID: Zahira Casarez is a 66year old male.     HPI    Patient had cough and wheezes required using albuterol and his wife's Symbicort which helped him  No fever or chills  Increase wheezes and dry cough and some dyspnea lately bu MOUTH DAILY 90 tablet 3   • ezetimibe 10 MG Oral Tab Take 1 tablet (10 mg total) by mouth daily. 90 tablet 3   • sertraline 100 MG Oral Tab Take 1 tablet (100 mg total) by mouth daily.  90 tablet 1   • cephALEXin 500 MG Oral Cap      • LOSARTAN POTASSIUM 50 seconds. Neurological:      Mental Status: He is oriented to person, place, and time.    Psychiatric:         Behavior: Behavior normal.         Assessment & Plan:   Chronic obstructive pulmonary disease with acute exacerbation ([facility] [address]  (primary encounter d

## 2022-01-24 RX ORDER — SERTRALINE HYDROCHLORIDE 100 MG/1
100 TABLET, FILM COATED ORAL DAILY
Qty: 90 TABLET | Refills: 0 | Status: SHIPPED | OUTPATIENT
Start: 2022-01-24

## 2022-01-24 NOTE — TELEPHONE ENCOUNTER
Routing through protocol.     ----- Message from Gina Pino RN sent at 1/24/2022 11:43 AM CST -----  Regarding: FW: Med      ----- Message -----  From: Chacho Hawthorne  Sent: 1/24/2022  11:33 AM CST  To: Em Rn Triage  Subject: Med                                              Sertraline prescription needs to be renewed please

## 2022-01-24 NOTE — TELEPHONE ENCOUNTER
Please review. Protocol Failed / No Protocol. Requested Prescriptions   Pending Prescriptions Disp Refills    sertraline 100 MG Oral Tab 90 tablet 1     Sig: Take 1 tablet (100 mg total) by mouth daily.         Psychiatric Non-Scheduled (Anti-Anxiety) Failed - 1/24/2022 12:09 PM        Failed - Appointment in last 6 or next 3 months              Recent Outpatient Visits              2 weeks ago Chronic obstructive pulmonary disease with acute exacerbation [facility])    [facility], [facility]    Office Visit    1 month ago Mixed hyperlipidemia    Cardiology - [address], Evan Hernández MD    Office Visit    6 months ago Pain of right hand    [address], Famurphy Cloud, DO    Office Visit    6 months ago Ganglion cyst of tendon sheath of right hand    [address], DO    Office Visit    7 months ago Hand edema    [address] [facility] Crow Santa MD    Office Visit

## 2022-02-09 ENCOUNTER — HOSPITAL ENCOUNTER (OUTPATIENT)
Dept: GENERAL RADIOLOGY | Facility: HOSPITAL | Age: 72
Discharge: HOME | End: 2022-02-09
Attending: ORTHOPAEDIC SURGERY
Payer: MEDICARE

## 2022-02-09 ENCOUNTER — OFFICE VISIT (OUTPATIENT)
Dept: ORTHOPEDICS CLINIC | Facility: CLINIC | Age: 72
End: 2022-02-09
Payer: MEDICARE

## 2022-02-09 DIAGNOSIS — M25.572 LEFT ANKLE PAIN, UNSPECIFIED CHRONICITY: Primary | ICD-10-CM

## 2022-02-09 DIAGNOSIS — M25.572 LEFT ANKLE PAIN, UNSPECIFIED CHRONICITY: ICD-10-CM

## 2022-02-09 DIAGNOSIS — S82.65XA NONDISPLACED FRACTURE OF LATERAL MALLEOLUS OF LEFT FIBULA, INITIAL ENCOUNTER FOR CLOSED FRACTURE: ICD-10-CM

## 2022-02-09 PROCEDURE — 99213 OFFICE O/P EST LOW 20 MIN: CPT | Performed by: ORTHOPAEDIC SURGERY

## 2022-02-09 PROCEDURE — 27786 TREATMENT OF ANKLE FRACTURE: CPT | Performed by: ORTHOPAEDIC SURGERY

## 2022-02-09 PROCEDURE — 73610 X-RAY EXAM OF ANKLE: CPT | Performed by: ORTHOPAEDIC SURGERY

## 2022-03-14 ENCOUNTER — OFFICE VISIT (OUTPATIENT)
Dept: ORTHOPEDICS CLINIC | Facility: CLINIC | Age: 72
End: 2022-03-14
Payer: MEDICARE

## 2022-03-14 ENCOUNTER — HOSPITAL ENCOUNTER (OUTPATIENT)
Dept: GENERAL RADIOLOGY | Facility: HOSPITAL | Age: 72
Discharge: HOME | End: 2022-03-14
Attending: ORTHOPAEDIC SURGERY
Payer: MEDICARE

## 2022-03-14 DIAGNOSIS — Z47.89 ORTHOPEDIC AFTERCARE: Primary | ICD-10-CM

## 2022-03-14 DIAGNOSIS — Z47.89 ORTHOPEDIC AFTERCARE: ICD-10-CM

## 2022-03-14 PROCEDURE — 73610 X-RAY EXAM OF ANKLE: CPT | Performed by: ORTHOPAEDIC SURGERY

## 2022-03-14 PROCEDURE — 99024 POSTOP FOLLOW-UP VISIT: CPT | Performed by: ORTHOPAEDIC SURGERY

## 2022-04-27 ENCOUNTER — TELEPHONE (OUTPATIENT)
Dept: FAMILY MEDICINE CLINIC | Facility: CLINIC | Age: 72
End: 2022-04-27

## 2022-04-27 NOTE — TELEPHONE ENCOUNTER
Patient scheduled appointment via [address] with PCP for 5/6/22 noting the following:    Why my neck is sore- is it a swollen gland?     LMTCB 4/27/22

## 2022-04-28 RX ORDER — SERTRALINE HYDROCHLORIDE 100 MG/1
100 TABLET, FILM COATED ORAL DAILY
Qty: 90 TABLET | Refills: 0 | Status: SHIPPED | OUTPATIENT
Start: 2022-04-28

## 2022-04-28 NOTE — TELEPHONE ENCOUNTER
Left a detailed message to his wife's cell (ok per ANTONIO) regarding note below. Advised to have him call us and speak with RN to discuss symptoms.

## 2022-04-29 NOTE — TELEPHONE ENCOUNTER
[facility] requesting refill for Sertraline 100 mg.
Refill passed per [facility] protocol   Requested Prescriptions   Pending Prescriptions Disp Refills    sertraline 100 MG Oral Tab 90 tablet 0     Sig: Take 1 tablet (100 mg total) by mouth daily.         Psychiatric Non-Scheduled (Anti-Anxiety) Passed - 4/28/2022  1:07 PM        Passed - Appointment in last 6 or next 3 months             Future Appointments   Date Time Provider Bradley De La Cruz   5/6/2022 11:40 AM DO ROBERTO [facility] [place]
1-2 cups/cans per day

## 2022-04-29 NOTE — TELEPHONE ENCOUNTER
Spoke to [facility], patient's spouse,  (on ANTONIO, verified patient's name and ). She said that he is very bad at describing things. She has felt his neck and there don't appear to be any swollen lymph nodes. If anything worsens, she will call to get him in sooner but feels like he can wait for 22.

## 2022-05-06 ENCOUNTER — OFFICE VISIT (OUTPATIENT)
Dept: FAMILY MEDICINE CLINIC | Facility: CLINIC | Age: 72
End: 2022-05-06
Payer: MEDICARE

## 2022-05-06 VITALS
HEART RATE: 80 BPM | HEIGHT: 66 IN | WEIGHT: 235.56 LBS | SYSTOLIC BLOOD PRESSURE: 122 MMHG | BODY MASS INDEX: 37.86 KG/M2 | DIASTOLIC BLOOD PRESSURE: 71 MMHG

## 2022-05-06 DIAGNOSIS — L30.0 NUMMULAR ECZEMA: ICD-10-CM

## 2022-05-06 DIAGNOSIS — R09.89 GLOBUS PHARYNGEUS: Primary | ICD-10-CM

## 2022-05-06 DIAGNOSIS — I10 ESSENTIAL HYPERTENSION: ICD-10-CM

## 2022-05-06 DIAGNOSIS — Z12.5 PROSTATE CANCER SCREENING: ICD-10-CM

## 2022-05-06 DIAGNOSIS — R73.03 PREDIABETES: ICD-10-CM

## 2022-05-06 PROCEDURE — 99213 OFFICE O/P EST LOW 20 MIN: CPT | Performed by: FAMILY MEDICINE

## 2022-05-06 NOTE — PROGRESS NOTES
Blood pressure 122/71, pulse 80, height 5' 6\" (1.676 m), weight 235 lb 9 oz (106.9 kg). Patient presents today complaining of pressure when swallowing. No Dysphonia. Quit smoking about 15 years ago.     Objective comfortable no apparent distress there is ecchymosis over the right eyelid  Throat clear nonerythematous    Neck with no thyromegaly  Assessment globus and ecchymosis of the right eyelid improving per patient    Plan reassurance regarding ecchymosis    Ultrasound of thyroid referral to ENT

## 2022-05-23 ENCOUNTER — HOSPITAL ENCOUNTER (OUTPATIENT)
Dept: ULTRASOUND IMAGING | Age: 72
Discharge: HOME | End: 2022-05-23
Attending: FAMILY MEDICINE
Payer: MEDICARE

## 2022-05-23 DIAGNOSIS — R09.89 GLOBUS PHARYNGEUS: ICD-10-CM

## 2022-05-23 PROCEDURE — 76536 US EXAM OF HEAD AND NECK: CPT | Performed by: FAMILY MEDICINE

## 2022-05-23 NOTE — TELEPHONE ENCOUNTER
LOV: 1/7/2022  Last refill: 1/7/2022    Dr. Trudy Draper review and sign pended prescription if agreeable.

## 2022-05-24 RX ORDER — BUDESONIDE AND FORMOTEROL FUMARATE DIHYDRATE 160; 4.5 UG/1; UG/1
AEROSOL RESPIRATORY (INHALATION)
Qty: 10.2 G | Refills: 3 | Status: SHIPPED | OUTPATIENT
Start: 2022-05-24

## 2022-06-10 ENCOUNTER — LAB ENCOUNTER (OUTPATIENT)
Dept: LAB | Age: 72
End: 2022-06-10
Attending: FAMILY MEDICINE
Payer: MEDICARE

## 2022-06-10 DIAGNOSIS — R73.03 PREDIABETES: ICD-10-CM

## 2022-06-10 DIAGNOSIS — Z12.5 PROSTATE CANCER SCREENING: ICD-10-CM

## 2022-06-10 DIAGNOSIS — I10 ESSENTIAL HYPERTENSION: ICD-10-CM

## 2022-06-10 LAB
ALBUMIN SERPL-MCNC: 3.9 G/DL (ref 3.4–5)
ALBUMIN/GLOB SERPL: 1.3 {RATIO} (ref 1–2)
ALP LIVER SERPL-CCNC: 88 U/L
ALT SERPL-CCNC: 79 U/L
ANION GAP SERPL CALC-SCNC: 6 MMOL/L (ref 0–18)
AST SERPL-CCNC: 48 U/L (ref 15–37)
BILIRUB SERPL-MCNC: 0.7 MG/DL (ref 0.1–2)
BUN BLD-MCNC: 12 MG/DL (ref 7–18)
BUN/CREAT SERPL: 11.9 (ref 10–20)
CALCIUM BLD-MCNC: 9.6 MG/DL (ref 8.5–10.1)
CHLORIDE SERPL-SCNC: 98 MMOL/L (ref 98–112)
CHOLEST SERPL-MCNC: 139 MG/DL (ref ?–200)
CO2 SERPL-SCNC: 33 MMOL/L (ref 21–32)
COMPLEXED PSA SERPL-MCNC: 0.42 NG/ML (ref ?–4)
CREAT BLD-MCNC: 1.01 MG/DL
EST. AVERAGE GLUCOSE BLD GHB EST-MCNC: 117 MG/DL (ref 68–126)
FASTING PATIENT LIPID ANSWER: YES
FASTING STATUS PATIENT QL REPORTED: YES
GLOBULIN PLAS-MCNC: 3 G/DL (ref 2.8–4.4)
GLUCOSE BLD-MCNC: 111 MG/DL (ref 70–99)
HBA1C MFR BLD: 5.7 % (ref ?–5.7)
HDLC SERPL-MCNC: 56 MG/DL (ref 40–59)
LDLC SERPL CALC-MCNC: 64 MG/DL (ref ?–100)
NONHDLC SERPL-MCNC: 83 MG/DL (ref ?–130)
OSMOLALITY SERPL CALC.SUM OF ELEC: 284 MOSM/KG (ref 275–295)
POTASSIUM SERPL-SCNC: 4.7 MMOL/L (ref 3.5–5.1)
PROT SERPL-MCNC: 6.9 G/DL (ref 6.4–8.2)
SODIUM SERPL-SCNC: 137 MMOL/L (ref 136–145)
TRIGL SERPL-MCNC: 104 MG/DL (ref 30–149)
TSI SER-ACNC: 1.82 MIU/ML (ref 0.36–3.74)
VLDLC SERPL CALC-MCNC: 16 MG/DL (ref 0–30)

## 2022-06-10 PROCEDURE — 80061 LIPID PANEL: CPT

## 2022-06-10 PROCEDURE — 84443 ASSAY THYROID STIM HORMONE: CPT

## 2022-06-10 PROCEDURE — 36415 COLL VENOUS BLD VENIPUNCTURE: CPT

## 2022-06-10 PROCEDURE — 83036 HEMOGLOBIN GLYCOSYLATED A1C: CPT

## 2022-06-10 PROCEDURE — 80053 COMPREHEN METABOLIC PANEL: CPT

## 2022-07-05 ENCOUNTER — OFFICE VISIT (OUTPATIENT)
Dept: OTOLARYNGOLOGY | Facility: CLINIC | Age: 72
End: 2022-07-05
Payer: MEDICARE

## 2022-07-05 VITALS — WEIGHT: 230 LBS | BODY MASS INDEX: 36.1 KG/M2 | HEIGHT: 67 IN

## 2022-07-05 DIAGNOSIS — R09.89 GLOBUS SENSATION: Primary | ICD-10-CM

## 2022-07-05 DIAGNOSIS — E04.2 MULTINODULAR GOITER: ICD-10-CM

## 2022-07-05 DIAGNOSIS — K21.9 GASTROESOPHAGEAL REFLUX DISEASE, UNSPECIFIED WHETHER ESOPHAGITIS PRESENT: ICD-10-CM

## 2022-07-05 PROCEDURE — 1126F AMNT PAIN NOTED NONE PRSNT: CPT | Performed by: SPECIALIST

## 2022-07-05 PROCEDURE — 31575 DIAGNOSTIC LARYNGOSCOPY: CPT | Performed by: SPECIALIST

## 2022-07-05 PROCEDURE — 99213 OFFICE O/P EST LOW 20 MIN: CPT | Performed by: SPECIALIST

## 2022-07-05 RX ORDER — FAMOTIDINE 40 MG/1
40 TABLET, FILM COATED ORAL NIGHTLY
Qty: 30 TABLET | Refills: 3 | Status: SHIPPED | OUTPATIENT
Start: 2022-07-05

## 2022-07-05 NOTE — PATIENT INSTRUCTIONS
I patient a trial of famotidine at bedtime. Is also an antireflux diet. No greasy foods, spicy foods, chocolate, caffeine, alcohol, spearmint, peppermint, lemon, lime, orange, grapefruit, tomatoes. Don't eat 2 hours before bedtime  Elevate the head of bed  Follow-up in 4 weeks time, sooner if problems.

## 2022-07-12 ENCOUNTER — OFFICE VISIT (OUTPATIENT)
Dept: OTOLARYNGOLOGY | Facility: CLINIC | Age: 72
End: 2022-07-12
Payer: MEDICARE

## 2022-07-12 VITALS — BODY MASS INDEX: 36.1 KG/M2 | WEIGHT: 230 LBS | HEIGHT: 67 IN

## 2022-07-12 DIAGNOSIS — K21.9 GASTROESOPHAGEAL REFLUX DISEASE, UNSPECIFIED WHETHER ESOPHAGITIS PRESENT: ICD-10-CM

## 2022-07-12 DIAGNOSIS — E04.2 MULTINODULAR GOITER: ICD-10-CM

## 2022-07-12 DIAGNOSIS — H60.332 ACUTE SWIMMER'S EAR OF LEFT SIDE: Primary | ICD-10-CM

## 2022-07-12 PROCEDURE — 99213 OFFICE O/P EST LOW 20 MIN: CPT | Performed by: SPECIALIST

## 2022-07-12 PROCEDURE — 1126F AMNT PAIN NOTED NONE PRSNT: CPT | Performed by: SPECIALIST

## 2022-07-12 RX ORDER — OFLOXACIN 3 MG/ML
5 SOLUTION AURICULAR (OTIC) 2 TIMES DAILY
Qty: 5 ML | Refills: 0 | Status: SHIPPED | OUTPATIENT
Start: 2022-07-12

## 2022-07-12 NOTE — PATIENT INSTRUCTIONS
You can use Floxin otic drops for 10 days for the left otitis externa. Please avoid irrigation in the ears on a weekly basis as this can cause the infection. Continue the antireflux diet and over-the-counter famotidine. We will recheck your goiter with an ultrasound in about 1 years time.

## 2022-07-28 DIAGNOSIS — I10 ESSENTIAL HYPERTENSION: ICD-10-CM

## 2022-07-28 DIAGNOSIS — F43.10 PTSD (POST-TRAUMATIC STRESS DISORDER): ICD-10-CM

## 2022-07-28 RX ORDER — SERTRALINE HYDROCHLORIDE 100 MG/1
TABLET, FILM COATED ORAL
Qty: 90 TABLET | Refills: 0 | Status: SHIPPED | OUTPATIENT
Start: 2022-07-28

## 2022-08-07 ENCOUNTER — APPOINTMENT (OUTPATIENT)
Dept: GENERAL RADIOLOGY | Facility: HOSPITAL | Age: 72
End: 2022-08-07
Attending: HOSPITALIST
Payer: MEDICARE

## 2022-08-07 ENCOUNTER — HOSPITAL ENCOUNTER (INPATIENT)
Facility: HOSPITAL | Age: 72
LOS: 9 days | Discharge: NURSING, REHAB OR LONG-TERM CARE | End: 2022-08-19
Attending: STUDENT IN AN ORGANIZED HEALTH CARE EDUCATION/TRAINING PROGRAM | Admitting: HOSPITALIST
Payer: MEDICARE

## 2022-08-07 ENCOUNTER — APPOINTMENT (OUTPATIENT)
Dept: CT IMAGING | Facility: HOSPITAL | Age: 72
End: 2022-08-07
Attending: STUDENT IN AN ORGANIZED HEALTH CARE EDUCATION/TRAINING PROGRAM
Payer: MEDICARE

## 2022-08-07 ENCOUNTER — APPOINTMENT (OUTPATIENT)
Dept: GENERAL RADIOLOGY | Facility: HOSPITAL | Age: 72
End: 2022-08-07
Attending: STUDENT IN AN ORGANIZED HEALTH CARE EDUCATION/TRAINING PROGRAM
Payer: MEDICARE

## 2022-08-07 DIAGNOSIS — R42 LIGHTHEADEDNESS: Primary | ICD-10-CM

## 2022-08-07 DIAGNOSIS — I10 HYPERTENSION, UNSPECIFIED TYPE: ICD-10-CM

## 2022-08-07 DIAGNOSIS — R11.0 NAUSEA: ICD-10-CM

## 2022-08-07 LAB
ALBUMIN SERPL-MCNC: 3.7 G/DL (ref 3.4–5)
ALP LIVER SERPL-CCNC: 86 U/L
ALT SERPL-CCNC: 98 U/L
ANION GAP SERPL CALC-SCNC: 6 MMOL/L (ref 0–18)
AST SERPL-CCNC: 63 U/L (ref 15–37)
BASOPHILS # BLD AUTO: 0.04 X10(3) UL (ref 0–0.2)
BASOPHILS NFR BLD AUTO: 0.5 %
BILIRUB DIRECT SERPL-MCNC: 0.1 MG/DL (ref 0–0.2)
BILIRUB SERPL-MCNC: 0.4 MG/DL (ref 0.1–2)
BILIRUB UR QL: NEGATIVE
BUN BLD-MCNC: 19 MG/DL (ref 7–18)
BUN/CREAT SERPL: 18.4 (ref 10–20)
CALCIUM BLD-MCNC: 8.7 MG/DL (ref 8.5–10.1)
CHLORIDE SERPL-SCNC: 104 MMOL/L (ref 98–112)
CLARITY UR: CLEAR
CO2 SERPL-SCNC: 29 MMOL/L (ref 21–32)
COLOR UR: YELLOW
CREAT BLD-MCNC: 1.03 MG/DL
DEPRECATED RDW RBC AUTO: 46.1 FL (ref 35.1–46.3)
EOSINOPHIL # BLD AUTO: 0.11 X10(3) UL (ref 0–0.7)
EOSINOPHIL NFR BLD AUTO: 1.4 %
ERYTHROCYTE [DISTWIDTH] IN BLOOD BY AUTOMATED COUNT: 13 % (ref 11–15)
GFR SERPLBLD BASED ON 1.73 SQ M-ARVRAT: 76 ML/MIN/1.73M2 (ref 60–?)
GLUCOSE BLD-MCNC: 195 MG/DL (ref 70–99)
GLUCOSE BLDC GLUCOMTR-MCNC: 188 MG/DL (ref 70–99)
GLUCOSE UR-MCNC: NEGATIVE MG/DL
HCT VFR BLD AUTO: 44.7 %
HGB BLD-MCNC: 14.7 G/DL
IMM GRANULOCYTES # BLD AUTO: 0.03 X10(3) UL (ref 0–1)
IMM GRANULOCYTES NFR BLD: 0.4 %
KETONES UR-MCNC: NEGATIVE MG/DL
LEUKOCYTE ESTERASE UR QL STRIP.AUTO: NEGATIVE
LIPASE SERPL-CCNC: 127 U/L (ref 73–393)
LYMPHOCYTES # BLD AUTO: 2.5 X10(3) UL (ref 1–4)
LYMPHOCYTES NFR BLD AUTO: 31.1 %
MCH RBC QN AUTO: 31.5 PG (ref 26–34)
MCHC RBC AUTO-ENTMCNC: 32.9 G/DL (ref 31–37)
MCV RBC AUTO: 95.7 FL
MONOCYTES # BLD AUTO: 0.63 X10(3) UL (ref 0.1–1)
MONOCYTES NFR BLD AUTO: 7.8 %
NEUTROPHILS # BLD AUTO: 4.74 X10 (3) UL (ref 1.5–7.7)
NEUTROPHILS # BLD AUTO: 4.74 X10(3) UL (ref 1.5–7.7)
NEUTROPHILS NFR BLD AUTO: 58.8 %
NITRITE UR QL STRIP.AUTO: NEGATIVE
OSMOLALITY SERPL CALC.SUM OF ELEC: 296 MOSM/KG (ref 275–295)
PH UR: 5.5 [PH] (ref 5–8)
PLATELET # BLD AUTO: 208 10(3)UL (ref 150–450)
POTASSIUM SERPL-SCNC: 3.5 MMOL/L (ref 3.5–5.1)
PROCALCITONIN SERPL-MCNC: <0.02 NG/ML (ref ?–0.16)
PROT SERPL-MCNC: 7.2 G/DL (ref 6.4–8.2)
PROT UR-MCNC: NEGATIVE MG/DL
RBC # BLD AUTO: 4.67 X10(6)UL
SARS-COV-2 RNA RESP QL NAA+PROBE: NOT DETECTED
SODIUM SERPL-SCNC: 139 MMOL/L (ref 136–145)
SP GR UR STRIP: 1.01 (ref 1–1.03)
TROPONIN I HIGH SENSITIVITY: 10 NG/L
TROPONIN I HIGH SENSITIVITY: 9 NG/L
UROBILINOGEN UR STRIP-ACNC: 0.2
WBC # BLD AUTO: 8.1 X10(3) UL (ref 4–11)

## 2022-08-07 PROCEDURE — 99220 INITIAL OBSERVATION CARE,LEVL III: CPT | Performed by: HOSPITALIST

## 2022-08-07 PROCEDURE — 74019 RADEX ABDOMEN 2 VIEWS: CPT | Performed by: HOSPITALIST

## 2022-08-07 PROCEDURE — 71045 X-RAY EXAM CHEST 1 VIEW: CPT | Performed by: STUDENT IN AN ORGANIZED HEALTH CARE EDUCATION/TRAINING PROGRAM

## 2022-08-07 PROCEDURE — 70450 CT HEAD/BRAIN W/O DYE: CPT | Performed by: STUDENT IN AN ORGANIZED HEALTH CARE EDUCATION/TRAINING PROGRAM

## 2022-08-07 RX ORDER — ALBUTEROL SULFATE 90 UG/1
2 AEROSOL, METERED RESPIRATORY (INHALATION) EVERY 6 HOURS PRN
Status: DISCONTINUED | OUTPATIENT
Start: 2022-08-07 | End: 2022-08-10

## 2022-08-07 RX ORDER — SODIUM CHLORIDE 9 MG/ML
INJECTION, SOLUTION INTRAVENOUS CONTINUOUS
Status: DISCONTINUED | OUTPATIENT
Start: 2022-08-07 | End: 2022-08-10

## 2022-08-07 RX ORDER — SERTRALINE HYDROCHLORIDE 100 MG/1
100 TABLET, FILM COATED ORAL NIGHTLY
Status: DISCONTINUED | OUTPATIENT
Start: 2022-08-07 | End: 2022-08-19

## 2022-08-07 RX ORDER — HYDROCHLOROTHIAZIDE 12.5 MG/1
12.5 TABLET ORAL DAILY
Status: DISCONTINUED | OUTPATIENT
Start: 2022-08-07 | End: 2022-08-12

## 2022-08-07 RX ORDER — ONDANSETRON 2 MG/ML
4 INJECTION INTRAMUSCULAR; INTRAVENOUS EVERY 6 HOURS PRN
Status: DISCONTINUED | OUTPATIENT
Start: 2022-08-07 | End: 2022-08-11

## 2022-08-07 RX ORDER — ONDANSETRON 2 MG/ML
INJECTION INTRAMUSCULAR; INTRAVENOUS
Status: COMPLETED
Start: 2022-08-07 | End: 2022-08-07

## 2022-08-07 RX ORDER — FAMOTIDINE 20 MG/1
40 TABLET, FILM COATED ORAL NIGHTLY
Status: DISCONTINUED | OUTPATIENT
Start: 2022-08-07 | End: 2022-08-07

## 2022-08-07 RX ORDER — HYDRALAZINE HYDROCHLORIDE 20 MG/ML
10 INJECTION INTRAMUSCULAR; INTRAVENOUS EVERY 4 HOURS PRN
Status: DISCONTINUED | OUTPATIENT
Start: 2022-08-07 | End: 2022-08-10

## 2022-08-07 RX ORDER — EZETIMIBE 10 MG/1
10 TABLET ORAL DAILY
Status: DISCONTINUED | OUTPATIENT
Start: 2022-08-07 | End: 2022-08-19

## 2022-08-07 RX ORDER — FINASTERIDE 5 MG/1
5 TABLET, FILM COATED ORAL DAILY
Status: DISCONTINUED | OUTPATIENT
Start: 2022-08-07 | End: 2022-08-19

## 2022-08-07 RX ORDER — HEPARIN SODIUM 5000 [USP'U]/ML
5000 INJECTION, SOLUTION INTRAVENOUS; SUBCUTANEOUS EVERY 12 HOURS SCHEDULED
Status: DISCONTINUED | OUTPATIENT
Start: 2022-08-07 | End: 2022-08-19

## 2022-08-07 RX ORDER — ROSUVASTATIN CALCIUM 20 MG/1
20 TABLET, COATED ORAL NIGHTLY
Status: DISCONTINUED | OUTPATIENT
Start: 2022-08-07 | End: 2022-08-10

## 2022-08-07 RX ORDER — FLUTICASONE FUROATE AND VILANTEROL 200; 25 UG/1; UG/1
1 POWDER RESPIRATORY (INHALATION) DAILY
Status: DISCONTINUED | OUTPATIENT
Start: 2022-08-07 | End: 2022-08-19

## 2022-08-07 RX ORDER — CLOPIDOGREL BISULFATE 75 MG/1
75 TABLET ORAL DAILY
Status: DISCONTINUED | OUTPATIENT
Start: 2022-08-07 | End: 2022-08-12

## 2022-08-07 RX ORDER — NITROGLYCERIN 0.4 MG/1
0.4 TABLET SUBLINGUAL ONCE
Status: COMPLETED | OUTPATIENT
Start: 2022-08-07 | End: 2022-08-07

## 2022-08-07 RX ORDER — LOSARTAN POTASSIUM 50 MG/1
50 TABLET ORAL DAILY
Status: DISCONTINUED | OUTPATIENT
Start: 2022-08-07 | End: 2022-08-12

## 2022-08-07 RX ORDER — METOPROLOL TARTRATE 5 MG/5ML
5 INJECTION INTRAVENOUS EVERY 6 HOURS PRN
Status: DISCONTINUED | OUTPATIENT
Start: 2022-08-07 | End: 2022-08-10

## 2022-08-07 NOTE — PROGRESS NOTES
The order for \"Budesonide-Formoterol Fumarate (Symbicort) 160-4.5 mcg/ACT Inhaler\" meets criteria to convert to \"Fluticasone furoate-vilanterol (Breo Ellipta) 200-25 mcg/IHN Inhaler\" per the automatic substitution policy approved by the P&T committee.   Thank you,  Michael Alonso  8/7/2022 9:23 AM

## 2022-08-07 NOTE — PLAN OF CARE
Pt unable to keep any liquids, food, or medications down, IVF started and PRN Iv zofran. Iv protonix. Obstructive series negative. Bp elevated, Iv hydralazine and metoprolol ordered. Afebrile, but pt diaphoretic after vomiting. Emesis x2 while on this unit. Wife updated at bedside and over the phone. Problem: CARDIOVASCULAR - ADULT  Goal: Maintains optimal cardiac output and hemodynamic stability  Description: INTERVENTIONS:  - Monitor vital signs, rhythm, and trends  - Monitor for bleeding, hypotension and signs of decreased cardiac output  - Evaluate effectiveness of vasoactive medications to optimize hemodynamic stability  - Monitor arterial and/or venous puncture sites for bleeding and/or hematoma  - Assess quality of pulses, skin color and temperature  - Assess for signs of decreased coronary artery perfusion - ex.  Angina  - Evaluate fluid balance, assess for edema, trend weights  Outcome: Progressing  Goal: Absence of cardiac arrhythmias or at baseline  Description: INTERVENTIONS:  - Continuous cardiac monitoring, monitor vital signs, obtain 12 lead EKG if indicated  - Evaluate effectiveness of antiarrhythmic and heart rate control medications as ordered  - Initiate emergency measures for life threatening arrhythmias  - Monitor electrolytes and administer replacement therapy as ordered  Outcome: Progressing     Problem: RESPIRATORY - ADULT  Goal: Achieves optimal ventilation and oxygenation  Description: INTERVENTIONS:  - Assess for changes in respiratory status  - Assess for changes in mentation and behavior  - Position to facilitate oxygenation and minimize respiratory effort  - Oxygen supplementation based on oxygen saturation or ABGs  - Provide Smoking Cessation handout, if applicable  - Encourage broncho-pulmonary hygiene including cough, deep breathe, Incentive Spirometry  - Assess the need for suctioning and perform as needed  - Assess and instruct to report SOB or any respiratory difficulty  - Respiratory Therapy support as indicated  - Manage/alleviate anxiety  - Monitor for signs/symptoms of CO2 retention  Outcome: Progressing

## 2022-08-07 NOTE — ED INITIAL ASSESSMENT (HPI)
Per EMS pt woke up with N/V. EMS gave Zofran in route. Pt states he is from home with wife. Pt's pupil sizes are different R>L on assessment.    Pt feels cold to touch and diaphoretic

## 2022-08-07 NOTE — ED QUICK NOTES
Orders for admission, patient is aware of plan and ready to go upstairs. Any questions, please call ED RN Megan at [address].      Patient Covid vaccination status: Fully vaccinated     COVID Test Ordered in ED: Rapid SARS-CoV-2 by PCR    COVID Suspicion at Admission: Low clinical suspicion for COVID    Running Infusions:  None    Mental Status/LOC at time of transport: AOx4    Other pertinent information:   CIWA score: N/A   NIH score:  N/A

## 2022-08-08 ENCOUNTER — APPOINTMENT (OUTPATIENT)
Dept: CT IMAGING | Facility: HOSPITAL | Age: 72
End: 2022-08-08
Attending: HOSPITALIST
Payer: MEDICARE

## 2022-08-08 LAB
ANION GAP SERPL CALC-SCNC: 3 MMOL/L (ref 0–18)
BUN BLD-MCNC: 13 MG/DL (ref 7–18)
BUN/CREAT SERPL: 14.4 (ref 10–20)
CALCIUM BLD-MCNC: 8.4 MG/DL (ref 8.5–10.1)
CHLORIDE SERPL-SCNC: 107 MMOL/L (ref 98–112)
CO2 SERPL-SCNC: 30 MMOL/L (ref 21–32)
CREAT BLD-MCNC: 0.9 MG/DL
GFR SERPLBLD BASED ON 1.73 SQ M-ARVRAT: 90 ML/MIN/1.73M2 (ref 60–?)
GLUCOSE BLD-MCNC: 130 MG/DL (ref 70–99)
MAGNESIUM SERPL-MCNC: 1.9 MG/DL (ref 1.6–2.6)
OSMOLALITY SERPL CALC.SUM OF ELEC: 292 MOSM/KG (ref 275–295)
PHOSPHATE SERPL-MCNC: 2.7 MG/DL (ref 2.5–4.9)
POTASSIUM SERPL-SCNC: 3.8 MMOL/L (ref 3.5–5.1)
POTASSIUM SERPL-SCNC: 3.8 MMOL/L (ref 3.5–5.1)
SODIUM SERPL-SCNC: 140 MMOL/L (ref 136–145)

## 2022-08-08 PROCEDURE — 99226 SUBSEQUENT OBSERVATION CARE: CPT | Performed by: HOSPITALIST

## 2022-08-08 PROCEDURE — 71275 CT ANGIOGRAPHY CHEST: CPT | Performed by: HOSPITALIST

## 2022-08-08 RX ORDER — POTASSIUM CHLORIDE 14.9 MG/ML
20 INJECTION INTRAVENOUS ONCE
Status: COMPLETED | OUTPATIENT
Start: 2022-08-08 | End: 2022-08-08

## 2022-08-08 NOTE — PLAN OF CARE
Patient has a hard time keeping down fluids/foods. IV antiemetics given PRN. Alert and oriented X4. Dizziness at rest and when ambulating. On 2L of oxygen. 0.9 NS at 83 ml/hr. Problem: Patient Centered Care  Goal: Patient preferences are identified and integrated in the patient's plan of care  Description: Interventions:  - What would you like us to know as we care for you? My wife is involved with my care  - Provide timely, complete, and accurate information to patient/family  - Incorporate patient and family knowledge, values, beliefs, and cultural backgrounds into the planning and delivery of care  - Encourage patient/family to participate in care and decision-making at the level they choose  - Honor patient and family perspectives and choices  Outcome: Progressing     Problem: Patient/Family Goals  Goal: Patient/Family Long Term Goal  Description: Patient's Long Term Goal: Go home    Interventions:  - Consult with GI  - See additional Care Plan goals for specific interventions  Outcome: Progressing  Goal: Patient/Family Short Term Goal  Description: Patient's Short Term Goal: eat food and drink fluids without throwing up    Interventions:   - IV zofran/compazine  - clear liquids  - IVF  - See additional Care Plan goals for specific interventions  Outcome: Progressing     Problem: CARDIOVASCULAR - ADULT  Goal: Maintains optimal cardiac output and hemodynamic stability  Description: INTERVENTIONS:  - Monitor vital signs, rhythm, and trends  - Monitor for bleeding, hypotension and signs of decreased cardiac output  - Evaluate effectiveness of vasoactive medications to optimize hemodynamic stability  - Monitor arterial and/or venous puncture sites for bleeding and/or hematoma  - Assess quality of pulses, skin color and temperature  - Assess for signs of decreased coronary artery perfusion - ex.  Angina  - Evaluate fluid balance, assess for edema, trend weights  Outcome: Progressing  Goal: Absence of cardiac arrhythmias or at baseline  Description: INTERVENTIONS:  - Continuous cardiac monitoring, monitor vital signs, obtain 12 lead EKG if indicated  - Evaluate effectiveness of antiarrhythmic and heart rate control medications as ordered  - Initiate emergency measures for life threatening arrhythmias  - Monitor electrolytes and administer replacement therapy as ordered  Outcome: Progressing     Problem: RESPIRATORY - ADULT  Goal: Achieves optimal ventilation and oxygenation  Description: INTERVENTIONS:  - Assess for changes in respiratory status  - Assess for changes in mentation and behavior  - Position to facilitate oxygenation and minimize respiratory effort  - Oxygen supplementation based on oxygen saturation or ABGs  - Provide Smoking Cessation handout, if applicable  - Encourage broncho-pulmonary hygiene including cough, deep breathe, Incentive Spirometry  - Assess the need for suctioning and perform as needed  - Assess and instruct to report SOB or any respiratory difficulty  - Respiratory Therapy support as indicated  - Manage/alleviate anxiety  - Monitor for signs/symptoms of CO2 retention  Outcome: Progressing

## 2022-08-08 NOTE — CM/SW NOTE
08/08/22 0900   CM/SW Referral Data   Referral Source    Reason for Referral Discharge planning   Informant Spouse/Significant Other   Pertinent Medical Hx   Does patient have an established PCP? Yes  Diomedes Sharif)   Patient Info   Patient's Current Mental Status at Time of Assessment Alert;Oriented   Patient's Home Environment Condo/Apt with elevator   Patient lives with Spouse/Significant other   Patient Status Prior to Admission   Independent with ADLs and Mobility Yes   Discharge Needs   Anticipated D/C needs To be determined     Pt discussed during nursing rounds. Dx dizziness and nause. From home w/spouse, independent and active prior to dx. On 2L O2 (no home O2). PT/OT evals needed for dc recommendation, RN is aware. Plan: TBD    / to remain available for support and/or discharge planning.      KRYS Tucker    [phone]

## 2022-08-08 NOTE — PLAN OF CARE
Patient A&Ox4, up with assistance. Patient still dizzy with ambulation, stayed in bed most of the day. Nausea improving, patient was able to eat some jello for lunch and keep it down. IV fluids still in place. 1x albuterol inhaler given for wheezing. Patient instructed to call for assistance as needed. Problem: Patient Centered Care  Goal: Patient preferences are identified and integrated in the patient's plan of care  Description: Interventions:  - What would you like us to know as we care for you?  - Provide timely, complete, and accurate information to patient/family  - Incorporate patient and family knowledge, values, beliefs, and cultural backgrounds into the planning and delivery of care  - Encourage patient/family to participate in care and decision-making at the level they choose  - Honor patient and family perspectives and choices  Outcome: Progressing     Problem: Patient/Family Goals  Goal: Patient/Family Long Term Goal  Description: Patient's Long Term Goal: Control dizziness    Interventions:  - Cardiology consult  - See additional Care Plan goals for specific interventions  Outcome: Progressing  Goal: Patient/Family Short Term Goal  Description: Patient's Short Term Goal: Stop feeling nauseaous    Interventions:   - IV fluids  - Zofran  - See additional Care Plan goals for specific interventions  Outcome: Progressing     Problem: CARDIOVASCULAR - ADULT  Goal: Maintains optimal cardiac output and hemodynamic stability  Description: INTERVENTIONS:  - Monitor vital signs, rhythm, and trends  - Monitor for bleeding, hypotension and signs of decreased cardiac output  - Evaluate effectiveness of vasoactive medications to optimize hemodynamic stability  - Monitor arterial and/or venous puncture sites for bleeding and/or hematoma  - Assess quality of pulses, skin color and temperature  - Assess for signs of decreased coronary artery perfusion - ex.  Angina  - Evaluate fluid balance, assess for edema, trend weights  Outcome: Progressing  Goal: Absence of cardiac arrhythmias or at baseline  Description: INTERVENTIONS:  - Continuous cardiac monitoring, monitor vital signs, obtain 12 lead EKG if indicated  - Evaluate effectiveness of antiarrhythmic and heart rate control medications as ordered  - Initiate emergency measures for life threatening arrhythmias  - Monitor electrolytes and administer replacement therapy as ordered  Outcome: Progressing     Problem: RESPIRATORY - ADULT  Goal: Achieves optimal ventilation and oxygenation  Description: INTERVENTIONS:  - Assess for changes in respiratory status  - Assess for changes in mentation and behavior  - Position to facilitate oxygenation and minimize respiratory effort  - Oxygen supplementation based on oxygen saturation or ABGs  - Provide Smoking Cessation handout, if applicable  - Encourage broncho-pulmonary hygiene including cough, deep breathe, Incentive Spirometry  - Assess the need for suctioning and perform as needed  - Assess and instruct to report SOB or any respiratory difficulty  - Respiratory Therapy support as indicated  - Manage/alleviate anxiety  - Monitor for signs/symptoms of CO2 retention  Outcome: Progressing

## 2022-08-08 NOTE — H&P
[facility]    PATIENT'S NAME: Sanju Chu   ATTENDING PHYSICIAN: Carlos Love MD   PATIENT ACCOUNT#:   [id number]    LOCATION:  [address] [place] [address] RECORD #:   [id number]       YOB: 1950  ADMISSION DATE:       08/07/2022    HISTORY AND PHYSICAL EXAMINATION    CHIEF COMPLAINT:  Nausea, vomiting, diarrhea, dizziness. HISTORY OF PRESENT ILLNESS:  The patient is a 68-year-old male with past medical history of coronary artery disease status post stenting, history of hypertension, hyperlipidemia, presented to the hospital with vomiting. Overnight, the patient states that he had awoken, felt very dizzy, foggy in the brain, and had numerous episodes of dry heaving with nausea. Also states that his vision was slightly blurry. He felt very diaphoretic he said. His initial troponins were negative. He was found to be in accelerated hypertension with systolic blood pressures in excess of 200. He was admitted to the hospital for further management. He currently denies any abdominal pain, shortness of breath, chest pain, cough, fevers, chills, recent travel, or sick contacts. PAST MEDICAL HISTORY:  Positive for amputation of the finger, colon cancer, coronary artery disease, hypertension, hypercholesterolemia, history of malignant melanoma of the right upper extremity. PAST SURGICAL HISTORY:  Positive for bowel resection, cardiac catheterization, history of cardiac stent placement, colonoscopy, hernia surgery, right hip replacement, right knee replacement, left knee replacement. MEDICATIONS:  Home medications have been reviewed and reconciled. Please refer to the patient's chart for a detailed review regarding the patient's home medications. ALLERGIES:  No known drug allergies. FAMILY HISTORY:  Mother had a history of hypertension and heart disease and also father had a history of hypertension and heart disease.     SOCIAL HISTORY:  The patient is a former smoker, quit in 2002. Denies any illicit drug use. He does have 5 drinks weekly. REVIEW OF SYSTEMS:  A 10-point review of systems has been obtained and otherwise negative. PHYSICAL EXAMINATION:    GENERAL:  Patient lying in bed, appears to be in mild distress at this time. He is A and O x3. VITAL SIGNS:  Patient's temperature is 97.1, pulse of 66, respirations 18, blood pressure 176/81, saturating 96% on room air. HEENT:  Extraocular movements are intact. Pupils equal, round, and reactive to light and accommodation. Atraumatic, normocephalic. LUNGS:  Good air entry bilaterally. HEART:  S1, S2 appreciated. ABDOMEN:  Soft, nontender, nondistended. Positive bowel sounds. EXTREMITIES:  Peripheral pulses are positive. MUSCULOSKELETAL:  Full range of motion intact. NEUROLOGIC:  No focal deficits noted at this time. SKIN:  No rashes noted. PSYCHIATRIC:   Appropriate affect. LABORATORY DATA:  Patient's glucose is 195, sodium 139, potassium  3.5, chloride is 104, carbon dioxide 29, BUN is 19, creatinine is 1.03, calcium is 8.7, and calculated osmolality is 296. AST 63, ALT is 98. Troponins are 9 and 10 respectively. WBC is 8.1, hemoglobin is 14.7, hematocrit is 44.7. Patient underwent chest x-ray which shows left upper lobe pulmonary nodule appears grossly stable dating back to 07/21/2020 and was without significant hypermetabolic activity on 45/86 PET CT, all most consistent with a benign nodule. ASSESSMENT AND PLAN:  The patient is a 24-year-old male with past medical history of multiple comorbid conditions who was admitted to the hospital with diaphoresis, nausea and vomiting, and accelerated hypertension. 1.   Diaphoresis, nausea and vomiting. At this time, we will continue IV antiemetics and will continue to monitor the patient closely. 2.   Accelerated hypertension.   We will resume the patient's oral antihypertensives, start the patient on p.r.n. IV hydralazine, and continue to monitor. We will have Cardiology placed on consult. The patient's troponins have been currently negative. He does have a history of coronary artery disease. We will continue the patient's oral antiplatelet therapy. 3.   History of hypercholesterolemia. We will resume the patient's home regimen. 4. VTE prophylaxis will be heparin subcutaneously 5000 units b.i.d.  5.   Disposition: At this time, we will monitor closely. The patient is a Full Code. Further recommendations to follow. Greater than 70 minutes were spent with greater than 50% of the time spent face-to-face.     Dictated By Amy Jiang MD  d: 08/07/2022 09:22:24  t: 08/07/2022 14:07:10  Job [id number]  KRISTINE/

## 2022-08-09 ENCOUNTER — TELEPHONE (OUTPATIENT)
Dept: PULMONOLOGY | Facility: CLINIC | Age: 72
End: 2022-08-09

## 2022-08-09 LAB
ADENOVIRUS PCR:: NOT DETECTED
ALBUMIN SERPL-MCNC: 3.5 G/DL (ref 3.4–5)
ALBUMIN/GLOB SERPL: 1.1 {RATIO} (ref 1–2)
ALP LIVER SERPL-CCNC: 84 U/L
ALT SERPL-CCNC: 56 U/L
ANION GAP SERPL CALC-SCNC: 5 MMOL/L (ref 0–18)
ANION GAP SERPL CALC-SCNC: 8 MMOL/L (ref 0–18)
AST SERPL-CCNC: 33 U/L (ref 15–37)
B PARAPERT DNA SPEC QL NAA+PROBE: NOT DETECTED
B PERT DNA SPEC QL NAA+PROBE: NOT DETECTED
BASE EXCESS BLD CALC-SCNC: 6 MMOL/L (ref ?–2)
BASOPHILS # BLD AUTO: 0.02 X10(3) UL (ref 0–0.2)
BASOPHILS NFR BLD AUTO: 0.2 %
BILIRUB SERPL-MCNC: 0.6 MG/DL (ref 0.1–2)
BILIRUB UR QL: NEGATIVE
BUN BLD-MCNC: 13 MG/DL (ref 7–18)
BUN BLD-MCNC: 13 MG/DL (ref 7–18)
BUN/CREAT SERPL: 14.3 (ref 10–20)
BUN/CREAT SERPL: 14.6 (ref 10–20)
C PNEUM DNA SPEC QL NAA+PROBE: NOT DETECTED
CALCIUM BLD-MCNC: 8.9 MG/DL (ref 8.5–10.1)
CALCIUM BLD-MCNC: 8.9 MG/DL (ref 8.5–10.1)
CHLORIDE SERPL-SCNC: 102 MMOL/L (ref 98–112)
CHLORIDE SERPL-SCNC: 102 MMOL/L (ref 98–112)
CLARITY UR: CLEAR
CO2 SERPL-SCNC: 29 MMOL/L (ref 21–32)
CO2 SERPL-SCNC: 32 MMOL/L (ref 21–32)
COLOR UR: YELLOW
CORONAVIRUS 229E PCR:: NOT DETECTED
CORONAVIRUS HKU1 PCR:: NOT DETECTED
CORONAVIRUS NL63 PCR:: NOT DETECTED
CORONAVIRUS OC43 PCR:: NOT DETECTED
CREAT BLD-MCNC: 0.89 MG/DL
CREAT BLD-MCNC: 0.91 MG/DL
DEPRECATED RDW RBC AUTO: 48 FL (ref 35.1–46.3)
EOSINOPHIL # BLD AUTO: 0 X10(3) UL (ref 0–0.7)
EOSINOPHIL NFR BLD AUTO: 0 %
ERYTHROCYTE [DISTWIDTH] IN BLOOD BY AUTOMATED COUNT: 13.2 % (ref 11–15)
FLUAV RNA SPEC QL NAA+PROBE: NOT DETECTED
FLUBV RNA SPEC QL NAA+PROBE: NOT DETECTED
GFR SERPLBLD BASED ON 1.73 SQ M-ARVRAT: 88 ML/MIN/1.73M2 (ref 60–?)
GFR SERPLBLD BASED ON 1.73 SQ M-ARVRAT: 90 ML/MIN/1.73M2 (ref 60–?)
GLOBULIN PLAS-MCNC: 3.3 G/DL (ref 2.8–4.4)
GLUCOSE BLD-MCNC: 124 MG/DL (ref 70–99)
GLUCOSE BLD-MCNC: 124 MG/DL (ref 70–99)
GLUCOSE UR-MCNC: NEGATIVE MG/DL
HCO3 BLDA-SCNC: 29.5 MEQ/L (ref 21–27)
HCT VFR BLD AUTO: 44.4 %
HGB BLD-MCNC: 14.2 G/DL
HGB UR QL STRIP.AUTO: NEGATIVE
IMM GRANULOCYTES # BLD AUTO: 0.04 X10(3) UL (ref 0–1)
IMM GRANULOCYTES NFR BLD: 0.3 %
KETONES UR-MCNC: 40 MG/DL
LEUKOCYTE ESTERASE UR QL STRIP.AUTO: NEGATIVE
LYMPHOCYTES # BLD AUTO: 1.02 X10(3) UL (ref 1–4)
LYMPHOCYTES NFR BLD AUTO: 8.3 %
MAGNESIUM SERPL-MCNC: 2 MG/DL (ref 1.6–2.6)
MCH RBC QN AUTO: 31.3 PG (ref 26–34)
MCHC RBC AUTO-ENTMCNC: 32 G/DL (ref 31–37)
MCV RBC AUTO: 97.8 FL
METAPNEUMOVIRUS PCR:: NOT DETECTED
MODIFIED ALLEN TEST: POSITIVE
MONOCYTES # BLD AUTO: 0.69 X10(3) UL (ref 0.1–1)
MONOCYTES NFR BLD AUTO: 5.6 %
MYCOPLASMA PNEUMONIA PCR:: NOT DETECTED
NEUTROPHILS # BLD AUTO: 10.53 X10 (3) UL (ref 1.5–7.7)
NEUTROPHILS # BLD AUTO: 10.53 X10(3) UL (ref 1.5–7.7)
NEUTROPHILS NFR BLD AUTO: 85.6 %
NITRITE UR QL STRIP.AUTO: NEGATIVE
O2 CT BLD-SCNC: 19.4 VOL% (ref 15–23)
O2/TOTAL GAS SETTING VFR VENT: 24 %
OSMOLALITY SERPL CALC.SUM OF ELEC: 290 MOSM/KG (ref 275–295)
OSMOLALITY SERPL CALC.SUM OF ELEC: 290 MOSM/KG (ref 275–295)
OXYGEN LITERS/MINUTE: 1 L/MIN
PARAINFLUENZA 1 PCR:: NOT DETECTED
PARAINFLUENZA 2 PCR:: NOT DETECTED
PARAINFLUENZA 3 PCR:: NOT DETECTED
PARAINFLUENZA 4 PCR:: NOT DETECTED
PCO2 BLDA: 49 MM HG (ref 35–45)
PH BLDA: 7.42 [PH] (ref 7.35–7.45)
PH UR: 5.5 [PH] (ref 5–8)
PLATELET # BLD AUTO: 195 10(3)UL (ref 150–450)
PO2 BLDA: 75 MM HG (ref 80–100)
POTASSIUM SERPL-SCNC: 3.8 MMOL/L (ref 3.5–5.1)
POTASSIUM SERPL-SCNC: 3.8 MMOL/L (ref 3.5–5.1)
POTASSIUM SERPL-SCNC: 4 MMOL/L (ref 3.5–5.1)
PROCALCITONIN SERPL-MCNC: 0.03 NG/ML (ref ?–0.16)
PROT SERPL-MCNC: 6.8 G/DL (ref 6.4–8.2)
PROT UR-MCNC: NEGATIVE MG/DL
PUNCTURE CHARGE: YES
RBC # BLD AUTO: 4.54 X10(6)UL
RHINOVIRUS/ENTERO PCR:: NOT DETECTED
RSV RNA SPEC QL NAA+PROBE: NOT DETECTED
SAO2 % BLDA: 98.8 % (ref 94–100)
SARS-COV-2 RNA NPH QL NAA+NON-PROBE: DETECTED
SODIUM SERPL-SCNC: 139 MMOL/L (ref 136–145)
SODIUM SERPL-SCNC: 139 MMOL/L (ref 136–145)
SP GR UR STRIP: 1.02 (ref 1–1.03)
UROBILINOGEN UR STRIP-ACNC: 0.2
WBC # BLD AUTO: 12.3 X10(3) UL (ref 4–11)

## 2022-08-09 PROCEDURE — 99226 SUBSEQUENT OBSERVATION CARE: CPT | Performed by: HOSPITALIST

## 2022-08-09 PROCEDURE — 99223 1ST HOSP IP/OBS HIGH 75: CPT | Performed by: INTERNAL MEDICINE

## 2022-08-09 RX ORDER — POTASSIUM CHLORIDE 20 MEQ/1
40 TABLET, EXTENDED RELEASE ORAL ONCE
Status: COMPLETED | OUTPATIENT
Start: 2022-08-09 | End: 2022-08-09

## 2022-08-09 RX ORDER — METHYLPREDNISOLONE SODIUM SUCCINATE 40 MG/ML
40 INJECTION, POWDER, LYOPHILIZED, FOR SOLUTION INTRAMUSCULAR; INTRAVENOUS EVERY 6 HOURS
Status: DISCONTINUED | OUTPATIENT
Start: 2022-08-09 | End: 2022-08-10

## 2022-08-09 RX ORDER — IPRATROPIUM BROMIDE AND ALBUTEROL SULFATE 2.5; .5 MG/3ML; MG/3ML
3 SOLUTION RESPIRATORY (INHALATION)
Status: DISCONTINUED | OUTPATIENT
Start: 2022-08-09 | End: 2022-08-09

## 2022-08-09 RX ORDER — METHYLPREDNISOLONE SODIUM SUCCINATE 40 MG/ML
40 INJECTION, POWDER, LYOPHILIZED, FOR SOLUTION INTRAMUSCULAR; INTRAVENOUS EVERY 12 HOURS
Status: DISCONTINUED | OUTPATIENT
Start: 2022-08-09 | End: 2022-08-09

## 2022-08-09 NOTE — TELEPHONE ENCOUNTER
Sure  Hospitalist were taking care of the patient  They just called me for the consultation  I will stop by and see the patient  Thank you .

## 2022-08-09 NOTE — PLAN OF CARE
Pt alert and oriented x4. Denies chest pain. Reports some mild SOB. On 1L O2. Satting in the [address] [place] [address]. Wheezing heard upon auscultation. Albuterol inhaler administered. Still on IVF 0.9 NS @ 83 ml/hr. Continues to report nausea overnight. Zofran administered with some relief. Problem: Patient Centered Care  Goal: Patient preferences are identified and integrated in the patient's plan of care  Description: Interventions:  - What would you like us to know as we care for you?   - Provide timely, complete, and accurate information to patient/family  - Incorporate patient and family knowledge, values, beliefs, and cultural backgrounds into the planning and delivery of care  - Encourage patient/family to participate in care and decision-making at the level they choose  - Honor patient and family perspectives and choices  Outcome: Progressing     Problem: CARDIOVASCULAR - ADULT  Goal: Maintains optimal cardiac output and hemodynamic stability  Description: INTERVENTIONS:  - Monitor vital signs, rhythm, and trends  - Monitor for bleeding, hypotension and signs of decreased cardiac output  - Evaluate effectiveness of vasoactive medications to optimize hemodynamic stability  - Monitor arterial and/or venous puncture sites for bleeding and/or hematoma  - Assess quality of pulses, skin color and temperature  - Assess for signs of decreased coronary artery perfusion - ex.  Angina  - Evaluate fluid balance, assess for edema, trend weights  Outcome: Progressing  Goal: Absence of cardiac arrhythmias or at baseline  Description: INTERVENTIONS:  - Continuous cardiac monitoring, monitor vital signs, obtain 12 lead EKG if indicated  - Evaluate effectiveness of antiarrhythmic and heart rate control medications as ordered  - Initiate emergency measures for life threatening arrhythmias  - Monitor electrolytes and administer replacement therapy as ordered  Outcome: Progressing     Problem: RESPIRATORY - ADULT  Goal: Achieves optimal ventilation and oxygenation  Description: INTERVENTIONS:  - Assess for changes in respiratory status  - Assess for changes in mentation and behavior  - Position to facilitate oxygenation and minimize respiratory effort  - Oxygen supplementation based on oxygen saturation or ABGs  - Provide Smoking Cessation handout, if applicable  - Encourage broncho-pulmonary hygiene including cough, deep breathe, Incentive Spirometry  - Assess the need for suctioning and perform as needed  - Assess and instruct to report SOB or any respiratory difficulty  - Respiratory Therapy support as indicated  - Manage/alleviate anxiety  - Monitor for signs/symptoms of CO2 retention  Outcome: Progressing

## 2022-08-09 NOTE — PLAN OF CARE
Problem: Patient Centered Care  Goal: Patient preferences are identified and integrated in the patient's plan of care  Description: Interventions:  - What would you like us to know as we care for you? I live at home with my wife. - Provide timely, complete, and accurate information to patient/family  - Incorporate patient and family knowledge, values, beliefs, and cultural backgrounds into the planning and delivery of care  - Encourage patient/family to participate in care and decision-making at the level they choose  - Honor patient and family perspectives and choices  8/9/2022 1226 by Aleisha Ramos RN  Outcome: Progressing  8/9/2022 1224 by Aleisha Ramos RN  Outcome: Progressing     Problem: Patient/Family Goals  Goal: Patient/Family Long Term Goal  Description: Patient's Long Term Goal: to feel better    Interventions:  - ween O2  - steroids  _ Pulm consult  - See additional Care Plan goals for specific interventions  8/9/2022 1226 by Aleisha Ramos RN  Outcome: Progressing  8/9/2022 1224 by Aleisha Ramos RN  Outcome: Progressing  Goal: Patient/Family Short Term Goal  Description: Patient's Short Term Goal: to return home safe     Interventions:   - ween O2  - steroids  - Pulm consult  - See additional Care Plan goals for specific interventions  8/9/2022 1226 by Aleisha Ramos RN  Outcome: Progressing  8/9/2022 1224 by Aleisha Ramos RN  Outcome: Progressing     Problem: CARDIOVASCULAR - ADULT  Goal: Maintains optimal cardiac output and hemodynamic stability  Description: INTERVENTIONS:  - Monitor vital signs, rhythm, and trends  - Monitor for bleeding, hypotension and signs of decreased cardiac output  - Evaluate effectiveness of vasoactive medications to optimize hemodynamic stability  - Monitor arterial and/or venous puncture sites for bleeding and/or hematoma  - Assess quality of pulses, skin color and temperature  - Assess for signs of decreased coronary artery perfusion - ex. Angina  - Evaluate fluid balance, assess for edema, trend weights  8/9/2022 1226 by Catalina Banks RN  Outcome: Progressing  8/9/2022 1224 by Catalina Banks RN  Outcome: Progressing  Goal: Absence of cardiac arrhythmias or at baseline  Description: INTERVENTIONS:  - Continuous cardiac monitoring, monitor vital signs, obtain 12 lead EKG if indicated  - Evaluate effectiveness of antiarrhythmic and heart rate control medications as ordered  - Initiate emergency measures for life threatening arrhythmias  - Monitor electrolytes and administer replacement therapy as ordered  8/9/2022 1226 by Catalina Banks RN  Outcome: Progressing  8/9/2022 1224 by Catalina Banks RN  Outcome: Progressing     Problem: RESPIRATORY - ADULT  Goal: Achieves optimal ventilation and oxygenation  Description: INTERVENTIONS:  - Assess for changes in respiratory status  - Assess for changes in mentation and behavior  - Position to facilitate oxygenation and minimize respiratory effort  - Oxygen supplementation based on oxygen saturation or ABGs  - Provide Smoking Cessation handout, if applicable  - Encourage broncho-pulmonary hygiene including cough, deep breathe, Incentive Spirometry  - Assess the need for suctioning and perform as needed  - Assess and instruct to report SOB or any respiratory difficulty  - Respiratory Therapy support as indicated  - Manage/alleviate anxiety  - Monitor for signs/symptoms of CO2 retention  8/9/2022 1226 by Catalina Banks RN  Outcome: Progressing  8/9/2022 1224 by Catalina Banks RN  Outcome: Progressing     Patient is alert and oriented, 1L NC, VSS, solumedrol started. Pulmonary consulted. Pt walked to restroom today X1 assist. ABG were drawn. Pt feeling N/V zofran given.  Pt was able to tolerate a cup full of jello this AM.

## 2022-08-10 ENCOUNTER — APPOINTMENT (OUTPATIENT)
Dept: MRI IMAGING | Facility: HOSPITAL | Age: 72
End: 2022-08-10
Attending: HOSPITALIST
Payer: MEDICARE

## 2022-08-10 LAB
ANION GAP SERPL CALC-SCNC: 7 MMOL/L (ref 0–18)
BASOPHILS # BLD AUTO: 0 X10(3) UL (ref 0–0.2)
BASOPHILS NFR BLD AUTO: 0 %
BILIRUB UR QL: NEGATIVE
BUN BLD-MCNC: 18 MG/DL (ref 7–18)
BUN/CREAT SERPL: 21.2 (ref 10–20)
CALCIUM BLD-MCNC: 8.9 MG/DL (ref 8.5–10.1)
CHLORIDE SERPL-SCNC: 99 MMOL/L (ref 98–112)
CK SERPL-CCNC: 932 U/L
CLARITY UR: CLEAR
CO2 SERPL-SCNC: 30 MMOL/L (ref 21–32)
COLOR UR: YELLOW
CREAT BLD-MCNC: 0.85 MG/DL
CRP SERPL-MCNC: 0.58 MG/DL (ref ?–0.3)
DEPRECATED HBV CORE AB SER IA-ACNC: 102.3 NG/ML
DEPRECATED RDW RBC AUTO: 43.6 FL (ref 35.1–46.3)
EOSINOPHIL # BLD AUTO: 0 X10(3) UL (ref 0–0.7)
EOSINOPHIL NFR BLD AUTO: 0 %
ERYTHROCYTE [DISTWIDTH] IN BLOOD BY AUTOMATED COUNT: 12.4 % (ref 11–15)
EST. AVERAGE GLUCOSE BLD GHB EST-MCNC: 114 MG/DL (ref 68–126)
GFR SERPLBLD BASED ON 1.73 SQ M-ARVRAT: 91 ML/MIN/1.73M2 (ref 60–?)
GLUCOSE BLD-MCNC: 158 MG/DL (ref 70–99)
GLUCOSE UR-MCNC: 100 MG/DL
HBA1C MFR BLD: 5.6 % (ref ?–5.7)
HCT VFR BLD AUTO: 41.8 %
HGB BLD-MCNC: 14.2 G/DL
HGB UR QL STRIP.AUTO: NEGATIVE
IMM GRANULOCYTES # BLD AUTO: 0.06 X10(3) UL (ref 0–1)
IMM GRANULOCYTES NFR BLD: 0.5 %
LDH SERPL L TO P-CCNC: 243 U/L
LEUKOCYTE ESTERASE UR QL STRIP.AUTO: NEGATIVE
LYMPHOCYTES # BLD AUTO: 0.48 X10(3) UL (ref 1–4)
LYMPHOCYTES NFR BLD AUTO: 4.1 %
MCH RBC QN AUTO: 32.1 PG (ref 26–34)
MCHC RBC AUTO-ENTMCNC: 34 G/DL (ref 31–37)
MCV RBC AUTO: 94.4 FL
MONOCYTES # BLD AUTO: 0.15 X10(3) UL (ref 0.1–1)
MONOCYTES NFR BLD AUTO: 1.3 %
NEUTROPHILS # BLD AUTO: 11.12 X10 (3) UL (ref 1.5–7.7)
NEUTROPHILS # BLD AUTO: 11.12 X10(3) UL (ref 1.5–7.7)
NEUTROPHILS NFR BLD AUTO: 94.1 %
NITRITE UR QL STRIP.AUTO: NEGATIVE
NT-PROBNP SERPL-MCNC: 1476 PG/ML (ref ?–125)
OSMOLALITY SERPL CALC.SUM OF ELEC: 287 MOSM/KG (ref 275–295)
PH UR: 5.5 [PH] (ref 5–8)
PLATELET # BLD AUTO: 203 10(3)UL (ref 150–450)
POTASSIUM SERPL-SCNC: 3.5 MMOL/L (ref 3.5–5.1)
POTASSIUM SERPL-SCNC: 3.5 MMOL/L (ref 3.5–5.1)
PROCALCITONIN SERPL-MCNC: 0.02 NG/ML (ref ?–0.16)
PROT UR-MCNC: NEGATIVE MG/DL
RBC # BLD AUTO: 4.43 X10(6)UL
SODIUM SERPL-SCNC: 136 MMOL/L (ref 136–145)
SP GR UR STRIP: 1.02 (ref 1–1.03)
UROBILINOGEN UR STRIP-ACNC: 0.2
WBC # BLD AUTO: 11.8 X10(3) UL (ref 4–11)

## 2022-08-10 PROCEDURE — XW033E5 INTRODUCTION OF REMDESIVIR ANTI-INFECTIVE INTO PERIPHERAL VEIN, PERCUTANEOUS APPROACH, NEW TECHNOLOGY GROUP 5: ICD-10-PCS | Performed by: HOSPITALIST

## 2022-08-10 PROCEDURE — 99233 SBSQ HOSP IP/OBS HIGH 50: CPT | Performed by: HOSPITALIST

## 2022-08-10 PROCEDURE — 99233 SBSQ HOSP IP/OBS HIGH 50: CPT | Performed by: INTERNAL MEDICINE

## 2022-08-10 PROCEDURE — 70551 MRI BRAIN STEM W/O DYE: CPT | Performed by: HOSPITALIST

## 2022-08-10 RX ORDER — ALBUTEROL SULFATE 90 UG/1
4 AEROSOL, METERED RESPIRATORY (INHALATION) EVERY 4 HOURS PRN
Status: DISCONTINUED | OUTPATIENT
Start: 2022-08-10 | End: 2022-08-19

## 2022-08-10 RX ORDER — DIAZEPAM 5 MG/ML
2.5 INJECTION, SOLUTION INTRAMUSCULAR; INTRAVENOUS ONCE
Status: COMPLETED | OUTPATIENT
Start: 2022-08-10 | End: 2022-08-10

## 2022-08-10 RX ORDER — GUAIFENESIN 600 MG
600 TABLET, EXTENDED RELEASE 12 HR ORAL 2 TIMES DAILY
Status: DISCONTINUED | OUTPATIENT
Start: 2022-08-10 | End: 2022-08-19

## 2022-08-10 RX ORDER — BENZONATATE 100 MG/1
200 CAPSULE ORAL 3 TIMES DAILY PRN
Status: DISCONTINUED | OUTPATIENT
Start: 2022-08-10 | End: 2022-08-19

## 2022-08-10 RX ORDER — DEXAMETHASONE 6 MG/1
6 TABLET ORAL DAILY
Status: DISCONTINUED | OUTPATIENT
Start: 2022-08-10 | End: 2022-08-15

## 2022-08-10 RX ORDER — DEXAMETHASONE SODIUM PHOSPHATE 4 MG/ML
6 VIAL (ML) INJECTION DAILY
Status: DISCONTINUED | OUTPATIENT
Start: 2022-08-10 | End: 2022-08-15

## 2022-08-10 NOTE — PLAN OF CARE
Patient A&Ox4, up with assistance. Found to be covid positive. Feeling much better today. No complaints of nausea and able to tolerate food. MRI screening complete and PRN valium ordered for pre-medication prior to MRI. Plan of care discussed with patient at the bedside. Verbalized understanding. Patient instructed to call for assistance as needed. Problem: Patient Centered Care  Goal: Patient preferences are identified and integrated in the patient's plan of care  Description: Interventions:  - What would you like us to know as we care for you? I live at home with my wife.    - Provide timely, complete, and accurate information to patient/family  - Incorporate patient and family knowledge, values, beliefs, and cultural backgrounds into the planning and delivery of care  - Encourage patient/family to participate in care and decision-making at the level they choose  - Honor patient and family perspectives and choices  Outcome: Progressing     Problem: Patient/Family Goals  Goal: Patient/Family Long Term Goal  Description: Patient's Long Term Goal: to feel better    Interventions:  - ween O2  - steroids  _ Pulm consult  - See additional Care Plan goals for specific interventions  Outcome: Progressing  Goal: Patient/Family Short Term Goal  Description: Patient's Short Term Goal: to return home safe     Interventions:   - ween O2  - steroids  - Pulm consult  - See additional Care Plan goals for specific interventions  Outcome: Progressing     Problem: CARDIOVASCULAR - ADULT  Goal: Maintains optimal cardiac output and hemodynamic stability  Description: INTERVENTIONS:  - Monitor vital signs, rhythm, and trends  - Monitor for bleeding, hypotension and signs of decreased cardiac output  - Evaluate effectiveness of vasoactive medications to optimize hemodynamic stability  - Monitor arterial and/or venous puncture sites for bleeding and/or hematoma  - Assess quality of pulses, skin color and temperature  - Assess for signs of decreased coronary artery perfusion - ex.  Angina  - Evaluate fluid balance, assess for edema, trend weights  Outcome: Progressing  Goal: Absence of cardiac arrhythmias or at baseline  Description: INTERVENTIONS:  - Continuous cardiac monitoring, monitor vital signs, obtain 12 lead EKG if indicated  - Evaluate effectiveness of antiarrhythmic and heart rate control medications as ordered  - Initiate emergency measures for life threatening arrhythmias  - Monitor electrolytes and administer replacement therapy as ordered  Outcome: Progressing     Problem: RESPIRATORY - ADULT  Goal: Achieves optimal ventilation and oxygenation  Description: INTERVENTIONS:  - Assess for changes in respiratory status  - Assess for changes in mentation and behavior  - Position to facilitate oxygenation and minimize respiratory effort  - Oxygen supplementation based on oxygen saturation or ABGs  - Provide Smoking Cessation handout, if applicable  - Encourage broncho-pulmonary hygiene including cough, deep breathe, Incentive Spirometry  - Assess the need for suctioning and perform as needed  - Assess and instruct to report SOB or any respiratory difficulty  - Respiratory Therapy support as indicated  - Manage/alleviate anxiety  - Monitor for signs/symptoms of CO2 retention  Outcome: Progressing

## 2022-08-10 NOTE — PLAN OF CARE
Pt alert and oriented. Denies chest pain/SOB. On 1L O2 via nasal cannula. CPAP at night. Continues to report dizziness and nausea. Zofran administered. On 0.9 @ 50 ml/hr. Problem: Patient Centered Care  Goal: Patient preferences are identified and integrated in the patient's plan of care  Description: Interventions:  - What would you like us to know as we care for you? I live at home with my wife. - Provide timely, complete, and accurate information to patient/family  - Incorporate patient and family knowledge, values, beliefs, and cultural backgrounds into the planning and delivery of care  - Encourage patient/family to participate in care and decision-making at the level they choose  - Honor patient and family perspectives and choices  Outcome: Progressing     Problem: CARDIOVASCULAR - ADULT  Goal: Maintains optimal cardiac output and hemodynamic stability  Description: INTERVENTIONS:  - Monitor vital signs, rhythm, and trends  - Monitor for bleeding, hypotension and signs of decreased cardiac output  - Evaluate effectiveness of vasoactive medications to optimize hemodynamic stability  - Monitor arterial and/or venous puncture sites for bleeding and/or hematoma  - Assess quality of pulses, skin color and temperature  - Assess for signs of decreased coronary artery perfusion - ex.  Angina  - Evaluate fluid balance, assess for edema, trend weights  Outcome: Progressing  Goal: Absence of cardiac arrhythmias or at baseline  Description: INTERVENTIONS:  - Continuous cardiac monitoring, monitor vital signs, obtain 12 lead EKG if indicated  - Evaluate effectiveness of antiarrhythmic and heart rate control medications as ordered  - Initiate emergency measures for life threatening arrhythmias  - Monitor electrolytes and administer replacement therapy as ordered  Outcome: Progressing     Problem: RESPIRATORY - ADULT  Goal: Achieves optimal ventilation and oxygenation  Description: INTERVENTIONS:  - Assess for changes in respiratory status  - Assess for changes in mentation and behavior  - Position to facilitate oxygenation and minimize respiratory effort  - Oxygen supplementation based on oxygen saturation or ABGs  - Provide Smoking Cessation handout, if applicable  - Encourage broncho-pulmonary hygiene including cough, deep breathe, Incentive Spirometry  - Assess the need for suctioning and perform as needed  - Assess and instruct to report SOB or any respiratory difficulty  - Respiratory Therapy support as indicated  - Manage/alleviate anxiety  - Monitor for signs/symptoms of CO2 retention  Outcome: Progressing

## 2022-08-11 ENCOUNTER — APPOINTMENT (OUTPATIENT)
Dept: CV DIAGNOSTICS | Facility: HOSPITAL | Age: 72
End: 2022-08-11
Attending: Other
Payer: MEDICARE

## 2022-08-11 ENCOUNTER — APPOINTMENT (OUTPATIENT)
Dept: GENERAL RADIOLOGY | Facility: HOSPITAL | Age: 72
End: 2022-08-11
Attending: INTERNAL MEDICINE
Payer: MEDICARE

## 2022-08-11 ENCOUNTER — APPOINTMENT (OUTPATIENT)
Dept: CT IMAGING | Facility: HOSPITAL | Age: 72
End: 2022-08-11
Attending: Other
Payer: MEDICARE

## 2022-08-11 LAB
ALBUMIN SERPL-MCNC: 3.2 G/DL (ref 3.4–5)
ALBUMIN/GLOB SERPL: 1.1 {RATIO} (ref 1–2)
ALP LIVER SERPL-CCNC: 70 U/L
ALT SERPL-CCNC: 61 U/L
ANION GAP SERPL CALC-SCNC: 8 MMOL/L (ref 0–18)
AST SERPL-CCNC: 99 U/L (ref 15–37)
BASOPHILS # BLD AUTO: 0.01 X10(3) UL (ref 0–0.2)
BASOPHILS NFR BLD AUTO: 0.1 %
BILIRUB SERPL-MCNC: 0.4 MG/DL (ref 0.1–2)
BUN BLD-MCNC: 25 MG/DL (ref 7–18)
BUN/CREAT SERPL: 30.1 (ref 10–20)
CALCIUM BLD-MCNC: 8.4 MG/DL (ref 8.5–10.1)
CHLORIDE SERPL-SCNC: 99 MMOL/L (ref 98–112)
CHOLEST SERPL-MCNC: 119 MG/DL (ref ?–200)
CO2 SERPL-SCNC: 31 MMOL/L (ref 21–32)
CREAT BLD-MCNC: 0.83 MG/DL
CRP SERPL-MCNC: <0.29 MG/DL (ref ?–0.3)
D DIMER PPP FEU-MCNC: 0.27 UG/ML FEU (ref ?–0.74)
DEPRECATED HBV CORE AB SER IA-ACNC: 125.4 NG/ML
DEPRECATED RDW RBC AUTO: 43.4 FL (ref 35.1–46.3)
EOSINOPHIL # BLD AUTO: 0 X10(3) UL (ref 0–0.7)
EOSINOPHIL NFR BLD AUTO: 0 %
ERYTHROCYTE [DISTWIDTH] IN BLOOD BY AUTOMATED COUNT: 12.5 % (ref 11–15)
GFR SERPLBLD BASED ON 1.73 SQ M-ARVRAT: 92 ML/MIN/1.73M2 (ref 60–?)
GLOBULIN PLAS-MCNC: 3 G/DL (ref 2.8–4.4)
GLUCOSE BLD-MCNC: 132 MG/DL (ref 70–99)
GLUCOSE BLDC GLUCOMTR-MCNC: 128 MG/DL (ref 70–99)
HCT VFR BLD AUTO: 41.9 %
HDLC SERPL-MCNC: 50 MG/DL (ref 40–59)
HGB BLD-MCNC: 14.3 G/DL
IMM GRANULOCYTES # BLD AUTO: 0.14 X10(3) UL (ref 0–1)
IMM GRANULOCYTES NFR BLD: 0.8 %
LDH SERPL L TO P-CCNC: 266 U/L
LDLC SERPL CALC-MCNC: 51 MG/DL (ref ?–100)
LYMPHOCYTES # BLD AUTO: 0.71 X10(3) UL (ref 1–4)
LYMPHOCYTES NFR BLD AUTO: 3.9 %
MCH RBC QN AUTO: 32.1 PG (ref 26–34)
MCHC RBC AUTO-ENTMCNC: 34.1 G/DL (ref 31–37)
MCV RBC AUTO: 94.2 FL
MONOCYTES # BLD AUTO: 1.07 X10(3) UL (ref 0.1–1)
MONOCYTES NFR BLD AUTO: 5.9 %
NEUTROPHILS # BLD AUTO: 16.26 X10 (3) UL (ref 1.5–7.7)
NEUTROPHILS # BLD AUTO: 16.26 X10(3) UL (ref 1.5–7.7)
NEUTROPHILS NFR BLD AUTO: 89.3 %
NONHDLC SERPL-MCNC: 69 MG/DL (ref ?–130)
OSMOLALITY SERPL CALC.SUM OF ELEC: 292 MOSM/KG (ref 275–295)
PLATELET # BLD AUTO: 218 10(3)UL (ref 150–450)
POTASSIUM SERPL-SCNC: 3.5 MMOL/L (ref 3.5–5.1)
POTASSIUM SERPL-SCNC: 3.5 MMOL/L (ref 3.5–5.1)
POTASSIUM SERPL-SCNC: 4.1 MMOL/L (ref 3.5–5.1)
PROT SERPL-MCNC: 6.2 G/DL (ref 6.4–8.2)
RBC # BLD AUTO: 4.45 X10(6)UL
SODIUM SERPL-SCNC: 138 MMOL/L (ref 136–145)
TRIGL SERPL-MCNC: 92 MG/DL (ref 30–149)
VLDLC SERPL CALC-MCNC: 13 MG/DL (ref 0–30)
WBC # BLD AUTO: 18.2 X10(3) UL (ref 4–11)

## 2022-08-11 PROCEDURE — 93306 TTE W/DOPPLER COMPLETE: CPT | Performed by: OTHER

## 2022-08-11 PROCEDURE — 99233 SBSQ HOSP IP/OBS HIGH 50: CPT | Performed by: INTERNAL MEDICINE

## 2022-08-11 PROCEDURE — 70496 CT ANGIOGRAPHY HEAD: CPT | Performed by: OTHER

## 2022-08-11 PROCEDURE — 71045 X-RAY EXAM CHEST 1 VIEW: CPT | Performed by: INTERNAL MEDICINE

## 2022-08-11 PROCEDURE — 70498 CT ANGIOGRAPHY NECK: CPT | Performed by: OTHER

## 2022-08-11 PROCEDURE — 99223 1ST HOSP IP/OBS HIGH 75: CPT | Performed by: OTHER

## 2022-08-11 PROCEDURE — 99233 SBSQ HOSP IP/OBS HIGH 50: CPT | Performed by: HOSPITALIST

## 2022-08-11 RX ORDER — ATORVASTATIN CALCIUM 80 MG/1
80 TABLET, FILM COATED ORAL NIGHTLY
Status: DISCONTINUED | OUTPATIENT
Start: 2022-08-11 | End: 2022-08-19

## 2022-08-11 RX ORDER — ONDANSETRON 2 MG/ML
4 INJECTION INTRAMUSCULAR; INTRAVENOUS EVERY 6 HOURS PRN
Status: DISCONTINUED | OUTPATIENT
Start: 2022-08-11 | End: 2022-08-19

## 2022-08-11 RX ORDER — HYDRALAZINE HYDROCHLORIDE 20 MG/ML
10 INJECTION INTRAMUSCULAR; INTRAVENOUS EVERY 2 HOUR PRN
Status: DISCONTINUED | OUTPATIENT
Start: 2022-08-11 | End: 2022-08-19

## 2022-08-11 RX ORDER — POTASSIUM CHLORIDE 20 MEQ/1
40 TABLET, EXTENDED RELEASE ORAL EVERY 4 HOURS
Status: CANCELLED | OUTPATIENT
Start: 2022-08-11 | End: 2022-08-11

## 2022-08-11 RX ORDER — POTASSIUM CHLORIDE 20 MEQ/1
40 TABLET, EXTENDED RELEASE ORAL EVERY 4 HOURS
Status: COMPLETED | OUTPATIENT
Start: 2022-08-11 | End: 2022-08-11

## 2022-08-11 RX ORDER — LABETALOL HYDROCHLORIDE 5 MG/ML
10 INJECTION, SOLUTION INTRAVENOUS EVERY 10 MIN PRN
Status: COMPLETED | OUTPATIENT
Start: 2022-08-11 | End: 2022-08-13

## 2022-08-11 RX ORDER — METOCLOPRAMIDE HYDROCHLORIDE 5 MG/ML
10 INJECTION INTRAMUSCULAR; INTRAVENOUS EVERY 8 HOURS PRN
Status: DISCONTINUED | OUTPATIENT
Start: 2022-08-11 | End: 2022-08-19

## 2022-08-11 NOTE — IMAGING NOTE
Per echo department protocol, bubbles not performed on patients over 72years of age unless specifically stated in the comments or indications.   Echo change to standard echocardiogram.

## 2022-08-11 NOTE — CM/SW NOTE
Pt with L cerebellar infarct and PT/OT recommendation is for Acute Rehab  Called pt in his room and he is agreeable to plan. PMR ordered  CM requested department  [facility]) to initiate [address] referral for Acute Rehab. SW/CM will continue to follow.      Augusto Rodriguez MBA MSN, RN CTL/  [id number]

## 2022-08-11 NOTE — CM/SW NOTE
Department  notified of request for Acute Rehab, aidin referrals started. Assigned CM/SW to follow up with pt/family on further discharge planning.      Gerri Bryant  [facility]

## 2022-08-11 NOTE — PROGRESS NOTES
Pt. Was able to pass dyspahgia screen no abnormalities or difficulty with swallowing. Pt. Able to swallow 10 ounces of water without choking and said \"AHHH\"\" afterwards. Medications administered orally throughout the night no difficulty noted. ambulatory

## 2022-08-11 NOTE — PLAN OF CARE
NEUROLOGY PLAN OF CARE    Paged by primary team about MRI findings. Acute large left cerebellar ischemic stroke with mass effect including on 4th ventricle. Will need to be monitored closely in ICU for craniotomy watch. If any neurological decompensation, obtain CT brain stat and page neurosurgery.       Plan:   Plavix monotherapy, avoid dual antiplatelets due to stroke size  CTA head/neck  HOB elevated  Echocardiogram  LDL, Hemoglobin A1C    Full consult to follow in the morning    FELICIA Keith DO  8/10/2022 8:43 PM

## 2022-08-12 DIAGNOSIS — I63.9 ISCHEMIC STROKE (HCC): Primary | ICD-10-CM

## 2022-08-12 LAB
ALBUMIN SERPL-MCNC: 3.2 G/DL (ref 3.4–5)
ALBUMIN/GLOB SERPL: 1 {RATIO} (ref 1–2)
ALP LIVER SERPL-CCNC: 70 U/L
ALT SERPL-CCNC: 68 U/L
ANION GAP SERPL CALC-SCNC: 6 MMOL/L (ref 0–18)
AST SERPL-CCNC: 69 U/L (ref 15–37)
BASOPHILS # BLD AUTO: 0.01 X10(3) UL (ref 0–0.2)
BASOPHILS NFR BLD AUTO: 0.1 %
BILIRUB SERPL-MCNC: 0.5 MG/DL (ref 0.1–2)
BUN BLD-MCNC: 23 MG/DL (ref 7–18)
BUN/CREAT SERPL: 28.8 (ref 10–20)
CALCIUM BLD-MCNC: 8.2 MG/DL (ref 8.5–10.1)
CHLORIDE SERPL-SCNC: 98 MMOL/L (ref 98–112)
CO2 SERPL-SCNC: 33 MMOL/L (ref 21–32)
CREAT BLD-MCNC: 0.8 MG/DL
DEPRECATED RDW RBC AUTO: 43.6 FL (ref 35.1–46.3)
EOSINOPHIL # BLD AUTO: 0 X10(3) UL (ref 0–0.7)
EOSINOPHIL NFR BLD AUTO: 0 %
ERYTHROCYTE [DISTWIDTH] IN BLOOD BY AUTOMATED COUNT: 12.6 % (ref 11–15)
GFR SERPLBLD BASED ON 1.73 SQ M-ARVRAT: 93 ML/MIN/1.73M2 (ref 60–?)
GLOBULIN PLAS-MCNC: 3.1 G/DL (ref 2.8–4.4)
GLUCOSE BLD-MCNC: 136 MG/DL (ref 70–99)
HCT VFR BLD AUTO: 41.9 %
HGB BLD-MCNC: 14 G/DL
IMM GRANULOCYTES # BLD AUTO: 0.12 X10(3) UL (ref 0–1)
IMM GRANULOCYTES NFR BLD: 1 %
LYMPHOCYTES # BLD AUTO: 0.59 X10(3) UL (ref 1–4)
LYMPHOCYTES NFR BLD AUTO: 4.8 %
MCH RBC QN AUTO: 31.4 PG (ref 26–34)
MCHC RBC AUTO-ENTMCNC: 33.4 G/DL (ref 31–37)
MCV RBC AUTO: 93.9 FL
MONOCYTES # BLD AUTO: 0.73 X10(3) UL (ref 0.1–1)
MONOCYTES NFR BLD AUTO: 5.9 %
NEUTROPHILS # BLD AUTO: 10.97 X10 (3) UL (ref 1.5–7.7)
NEUTROPHILS # BLD AUTO: 10.97 X10(3) UL (ref 1.5–7.7)
NEUTROPHILS NFR BLD AUTO: 88.2 %
OSMOLALITY SERPL CALC.SUM OF ELEC: 290 MOSM/KG (ref 275–295)
PLATELET # BLD AUTO: 209 10(3)UL (ref 150–450)
POTASSIUM SERPL-SCNC: 4.1 MMOL/L (ref 3.5–5.1)
PROT SERPL-MCNC: 6.3 G/DL (ref 6.4–8.2)
RBC # BLD AUTO: 4.46 X10(6)UL
SODIUM SERPL-SCNC: 137 MMOL/L (ref 136–145)
TROPONIN I HIGH SENSITIVITY: 25 NG/L
WBC # BLD AUTO: 12.4 X10(3) UL (ref 4–11)

## 2022-08-12 PROCEDURE — 99233 SBSQ HOSP IP/OBS HIGH 50: CPT | Performed by: OTHER

## 2022-08-12 PROCEDURE — 99233 SBSQ HOSP IP/OBS HIGH 50: CPT | Performed by: HOSPITALIST

## 2022-08-12 PROCEDURE — 99233 SBSQ HOSP IP/OBS HIGH 50: CPT | Performed by: INTERNAL MEDICINE

## 2022-08-12 RX ORDER — LOSARTAN POTASSIUM 100 MG/1
100 TABLET ORAL DAILY
Status: DISCONTINUED | OUTPATIENT
Start: 2022-08-12 | End: 2022-08-17

## 2022-08-12 RX ORDER — HYDROCHLOROTHIAZIDE 25 MG/1
25 TABLET ORAL DAILY
Status: DISCONTINUED | OUTPATIENT
Start: 2022-08-13 | End: 2022-08-17

## 2022-08-12 RX ORDER — HYDROCHLOROTHIAZIDE 12.5 MG/1
12.5 TABLET ORAL ONCE
Status: COMPLETED | OUTPATIENT
Start: 2022-08-12 | End: 2022-08-12

## 2022-08-12 NOTE — PLAN OF CARE
Pt in bed throughout shift; shift NIH done; see flowsheets; complex neuro's done; see flowsheets. Pt ambulate to bathroom; pt's wife visited pt; updated on plan of care. Pt in bed; call light remains within reach; in lowest position. Problem: Patient Centered Care  Goal: Patient preferences are identified and integrated in the patient's plan of care  Description: Interventions:  - What would you like us to know as we care for you? I live at home with my wife. - Provide timely, complete, and accurate information to patient/family  - Incorporate patient and family knowledge, values, beliefs, and cultural backgrounds into the planning and delivery of care  - Encourage patient/family to participate in care and decision-making at the level they choose  - Honor patient and family perspectives and choices  Outcome: Progressing     Problem: Patient/Family Goals  Goal: Patient/Family Long Term Goal  Description: Patient's Long Term Goal: to feel better    Interventions:  - ween O2  - steroids  _ Pulm consult  - See additional Care Plan goals for specific interventions  Outcome: Progressing  Goal: Patient/Family Short Term Goal  Description: Patient's Short Term Goal: to return home safe     Interventions:   - ween O2  - steroids  - Pulm consult  - See additional Care Plan goals for specific interventions  Outcome: Progressing     Problem: CARDIOVASCULAR - ADULT  Goal: Maintains optimal cardiac output and hemodynamic stability  Description: INTERVENTIONS:  - Monitor vital signs, rhythm, and trends  - Monitor for bleeding, hypotension and signs of decreased cardiac output  - Evaluate effectiveness of vasoactive medications to optimize hemodynamic stability  - Monitor arterial and/or venous puncture sites for bleeding and/or hematoma  - Assess quality of pulses, skin color and temperature  - Assess for signs of decreased coronary artery perfusion - ex.  Angina  - Evaluate fluid balance, assess for edema, trend weights  Outcome: Progressing  Goal: Absence of cardiac arrhythmias or at baseline  Description: INTERVENTIONS:  - Continuous cardiac monitoring, monitor vital signs, obtain 12 lead EKG if indicated  - Evaluate effectiveness of antiarrhythmic and heart rate control medications as ordered  - Initiate emergency measures for life threatening arrhythmias  - Monitor electrolytes and administer replacement therapy as ordered  Outcome: Progressing     Problem: RESPIRATORY - ADULT  Goal: Achieves optimal ventilation and oxygenation  Description: INTERVENTIONS:  - Assess for changes in respiratory status  - Assess for changes in mentation and behavior  - Position to facilitate oxygenation and minimize respiratory effort  - Oxygen supplementation based on oxygen saturation or ABGs  - Provide Smoking Cessation handout, if applicable  - Encourage broncho-pulmonary hygiene including cough, deep breathe, Incentive Spirometry  - Assess the need for suctioning and perform as needed  - Assess and instruct to report SOB or any respiratory difficulty  - Respiratory Therapy support as indicated  - Manage/alleviate anxiety  - Monitor for signs/symptoms of CO2 retention  Outcome: Progressing

## 2022-08-13 ENCOUNTER — APPOINTMENT (OUTPATIENT)
Dept: CT IMAGING | Facility: HOSPITAL | Age: 72
End: 2022-08-13
Attending: HOSPITALIST
Payer: MEDICARE

## 2022-08-13 LAB
ALBUMIN SERPL-MCNC: 3.7 G/DL (ref 3.4–5)
ALBUMIN/GLOB SERPL: 1.1 {RATIO} (ref 1–2)
ALP LIVER SERPL-CCNC: 82 U/L
ALT SERPL-CCNC: 74 U/L
ANION GAP SERPL CALC-SCNC: 7 MMOL/L (ref 0–18)
AST SERPL-CCNC: 47 U/L (ref 15–37)
BASOPHILS # BLD AUTO: 0.02 X10(3) UL (ref 0–0.2)
BASOPHILS NFR BLD AUTO: 0.1 %
BILIRUB SERPL-MCNC: 0.6 MG/DL (ref 0.1–2)
BUN BLD-MCNC: 20 MG/DL (ref 7–18)
BUN/CREAT SERPL: 29 (ref 10–20)
CALCIUM BLD-MCNC: 9.3 MG/DL (ref 8.5–10.1)
CHLORIDE SERPL-SCNC: 95 MMOL/L (ref 98–112)
CO2 SERPL-SCNC: 31 MMOL/L (ref 21–32)
CREAT BLD-MCNC: 0.69 MG/DL
DEPRECATED RDW RBC AUTO: 41.2 FL (ref 35.1–46.3)
EOSINOPHIL # BLD AUTO: 0 X10(3) UL (ref 0–0.7)
EOSINOPHIL NFR BLD AUTO: 0 %
ERYTHROCYTE [DISTWIDTH] IN BLOOD BY AUTOMATED COUNT: 12.3 % (ref 11–15)
GFR SERPLBLD BASED ON 1.73 SQ M-ARVRAT: 97 ML/MIN/1.73M2 (ref 60–?)
GLOBULIN PLAS-MCNC: 3.5 G/DL (ref 2.8–4.4)
GLUCOSE BLD-MCNC: 135 MG/DL (ref 70–99)
GLUCOSE BLDC GLUCOMTR-MCNC: 152 MG/DL (ref 70–99)
HCT VFR BLD AUTO: 48.7 %
HGB BLD-MCNC: 17.1 G/DL
IMM GRANULOCYTES # BLD AUTO: 0.17 X10(3) UL (ref 0–1)
IMM GRANULOCYTES NFR BLD: 1.2 %
LYMPHOCYTES # BLD AUTO: 0.77 X10(3) UL (ref 1–4)
LYMPHOCYTES NFR BLD AUTO: 5.6 %
MCH RBC QN AUTO: 32 PG (ref 26–34)
MCHC RBC AUTO-ENTMCNC: 35.1 G/DL (ref 31–37)
MCV RBC AUTO: 91 FL
MONOCYTES # BLD AUTO: 0.89 X10(3) UL (ref 0.1–1)
MONOCYTES NFR BLD AUTO: 6.4 %
NEUTROPHILS # BLD AUTO: 12 X10 (3) UL (ref 1.5–7.7)
NEUTROPHILS # BLD AUTO: 12 X10(3) UL (ref 1.5–7.7)
NEUTROPHILS NFR BLD AUTO: 86.7 %
OSMOLALITY SERPL CALC.SUM OF ELEC: 281 MOSM/KG (ref 275–295)
PLATELET # BLD AUTO: 239 10(3)UL (ref 150–450)
POTASSIUM SERPL-SCNC: 3.6 MMOL/L (ref 3.5–5.1)
PROT SERPL-MCNC: 7.2 G/DL (ref 6.4–8.2)
RBC # BLD AUTO: 5.35 X10(6)UL
SODIUM SERPL-SCNC: 133 MMOL/L (ref 136–145)
WBC # BLD AUTO: 13.9 X10(3) UL (ref 4–11)

## 2022-08-13 PROCEDURE — 70450 CT HEAD/BRAIN W/O DYE: CPT | Performed by: HOSPITALIST

## 2022-08-13 PROCEDURE — 99233 SBSQ HOSP IP/OBS HIGH 50: CPT | Performed by: INTERNAL MEDICINE

## 2022-08-13 PROCEDURE — 99233 SBSQ HOSP IP/OBS HIGH 50: CPT | Performed by: HOSPITALIST

## 2022-08-13 RX ORDER — HYDRALAZINE HYDROCHLORIDE 25 MG/1
25 TABLET, FILM COATED ORAL EVERY 8 HOURS SCHEDULED
Status: DISCONTINUED | OUTPATIENT
Start: 2022-08-13 | End: 2022-08-14

## 2022-08-13 RX ORDER — LABETALOL HYDROCHLORIDE 5 MG/ML
10 INJECTION, SOLUTION INTRAVENOUS EVERY 6 HOURS PRN
Status: DISCONTINUED | OUTPATIENT
Start: 2022-08-13 | End: 2022-08-19

## 2022-08-13 RX ORDER — ACETAMINOPHEN 325 MG/1
650 TABLET ORAL EVERY 6 HOURS PRN
Status: DISCONTINUED | OUTPATIENT
Start: 2022-08-13 | End: 2022-08-19

## 2022-08-13 RX ORDER — CLONIDINE HYDROCHLORIDE 0.1 MG/1
0.1 TABLET ORAL ONCE
Status: DISCONTINUED | OUTPATIENT
Start: 2022-08-13 | End: 2022-08-13

## 2022-08-13 RX ORDER — SODIUM CHLORIDE 9 MG/ML
INJECTION, SOLUTION INTRAVENOUS CONTINUOUS
Status: DISCONTINUED | OUTPATIENT
Start: 2022-08-13 | End: 2022-08-15

## 2022-08-13 NOTE — PROGRESS NOTES
Pt alert X3, Q1 hour neuro checks- follow all commands, pupils equal and reactive, no acute neuro changes noted throughout this shift. Pt c/o of mild headache however he denied tylenol. Pt remains on 2l nasal cannula. \  Cardiologist Chidi Rubi MD, and Dr. Darinel Hebert notified of pt increasing bp no relief from PRN medications, or schedule medications. STAT  CT ordered please see chart for results. Cardene gtt restarted per MD orders.

## 2022-08-13 NOTE — OCCUPATIONAL THERAPY NOTE
Chart reviewed. Attempted patient for OT treatment session. Patient stated \"not today\" s/t fatigue and nausea. OT will follow up as appropriate.       Aniket Westfall, [address] [place] [address].  #[id number]

## 2022-08-13 NOTE — PHYSICAL THERAPY NOTE
Attempted PT follow up treatment. Patient received supine in bed- declining therapy due to fatigue and nausea. \"Not today. \"   Will reschedule.      Thank you,  Nika Herrmann, PT, DPT    [facility]

## 2022-08-14 ENCOUNTER — APPOINTMENT (OUTPATIENT)
Dept: CT IMAGING | Facility: HOSPITAL | Age: 72
End: 2022-08-14
Attending: Other
Payer: MEDICARE

## 2022-08-14 ENCOUNTER — APPOINTMENT (OUTPATIENT)
Dept: CT IMAGING | Facility: HOSPITAL | Age: 72
End: 2022-08-14
Attending: HOSPITALIST
Payer: MEDICARE

## 2022-08-14 LAB
ALBUMIN SERPL-MCNC: 3.6 G/DL (ref 3.4–5)
ALBUMIN/GLOB SERPL: 1 {RATIO} (ref 1–2)
ALP LIVER SERPL-CCNC: 81 U/L
ALT SERPL-CCNC: 67 U/L
ANION GAP SERPL CALC-SCNC: 8 MMOL/L (ref 0–18)
AST SERPL-CCNC: 30 U/L (ref 15–37)
BILIRUB SERPL-MCNC: 0.9 MG/DL (ref 0.1–2)
BUN BLD-MCNC: 24 MG/DL (ref 7–18)
BUN/CREAT SERPL: 26.7 (ref 10–20)
CALCIUM BLD-MCNC: 9 MG/DL (ref 8.5–10.1)
CHLORIDE SERPL-SCNC: 92 MMOL/L (ref 98–112)
CO2 SERPL-SCNC: 29 MMOL/L (ref 21–32)
CREAT BLD-MCNC: 0.9 MG/DL
DEPRECATED RDW RBC AUTO: 43.8 FL (ref 35.1–46.3)
ERYTHROCYTE [DISTWIDTH] IN BLOOD BY AUTOMATED COUNT: 12.9 % (ref 11–15)
GFR SERPLBLD BASED ON 1.73 SQ M-ARVRAT: 90 ML/MIN/1.73M2 (ref 60–?)
GLOBULIN PLAS-MCNC: 3.5 G/DL (ref 2.8–4.4)
GLUCOSE BLD-MCNC: 153 MG/DL (ref 70–99)
GLUCOSE BLDC GLUCOMTR-MCNC: 133 MG/DL (ref 70–99)
HCT VFR BLD AUTO: 51.4 %
HGB BLD-MCNC: 17.3 G/DL
MCH RBC QN AUTO: 31.2 PG (ref 26–34)
MCHC RBC AUTO-ENTMCNC: 33.7 G/DL (ref 31–37)
MCV RBC AUTO: 92.8 FL
OSMOLALITY SERPL CALC.SUM OF ELEC: 275 MOSM/KG (ref 275–295)
PLATELET # BLD AUTO: 301 10(3)UL (ref 150–450)
POTASSIUM SERPL-SCNC: 3.6 MMOL/L (ref 3.5–5.1)
PROT SERPL-MCNC: 7.1 G/DL (ref 6.4–8.2)
RBC # BLD AUTO: 5.54 X10(6)UL
SODIUM SERPL-SCNC: 129 MMOL/L (ref 136–145)
WBC # BLD AUTO: 15.6 X10(3) UL (ref 4–11)

## 2022-08-14 PROCEDURE — 70450 CT HEAD/BRAIN W/O DYE: CPT | Performed by: HOSPITALIST

## 2022-08-14 PROCEDURE — 99233 SBSQ HOSP IP/OBS HIGH 50: CPT | Performed by: HOSPITALIST

## 2022-08-14 PROCEDURE — 99232 SBSQ HOSP IP/OBS MODERATE 35: CPT | Performed by: INTERNAL MEDICINE

## 2022-08-14 PROCEDURE — 99232 SBSQ HOSP IP/OBS MODERATE 35: CPT | Performed by: OTHER

## 2022-08-14 PROCEDURE — 70450 CT HEAD/BRAIN W/O DYE: CPT | Performed by: OTHER

## 2022-08-14 RX ORDER — PANTOPRAZOLE SODIUM 40 MG/1
40 TABLET, DELAYED RELEASE ORAL
Status: DISCONTINUED | OUTPATIENT
Start: 2022-08-15 | End: 2022-08-19

## 2022-08-14 RX ORDER — HYDRALAZINE HYDROCHLORIDE 50 MG/1
50 TABLET, FILM COATED ORAL EVERY 8 HOURS SCHEDULED
Status: DISCONTINUED | OUTPATIENT
Start: 2022-08-14 | End: 2022-08-17

## 2022-08-14 NOTE — PROGRESS NOTES
Patient alert and oriented x3, follows commands pupils equal and reactive, 3L Nasal cannula. No neuro deficits noted during this shift. Pt did c/o blurry vision, MD. Ligia Sigala notified and aware and stat CT orders no acute changes from previous CT at 4am. Q1 neuro checks completed. Pt denied lionel nausea and vomiting during this shift. Frequent rounding and neuro checks completed. Per Dr. Vicki Bradley pt okay to transfer to Tele.

## 2022-08-15 ENCOUNTER — APPOINTMENT (OUTPATIENT)
Dept: CT IMAGING | Facility: HOSPITAL | Age: 72
End: 2022-08-15
Attending: Other
Payer: MEDICARE

## 2022-08-15 LAB
ANION GAP SERPL CALC-SCNC: 9 MMOL/L (ref 0–18)
BASOPHILS # BLD AUTO: 0.05 X10(3) UL (ref 0–0.2)
BASOPHILS NFR BLD AUTO: 0.3 %
BUN BLD-MCNC: 26 MG/DL (ref 7–18)
BUN/CREAT SERPL: 29.9 (ref 10–20)
CALCIUM BLD-MCNC: 8.9 MG/DL (ref 8.5–10.1)
CHLORIDE SERPL-SCNC: 94 MMOL/L (ref 98–112)
CO2 SERPL-SCNC: 28 MMOL/L (ref 21–32)
CREAT BLD-MCNC: 0.87 MG/DL
DEPRECATED RDW RBC AUTO: 43.9 FL (ref 35.1–46.3)
EOSINOPHIL # BLD AUTO: 0.01 X10(3) UL (ref 0–0.7)
EOSINOPHIL NFR BLD AUTO: 0.1 %
ERYTHROCYTE [DISTWIDTH] IN BLOOD BY AUTOMATED COUNT: 13 % (ref 11–15)
GFR SERPLBLD BASED ON 1.73 SQ M-ARVRAT: 91 ML/MIN/1.73M2 (ref 60–?)
GLUCOSE BLD-MCNC: 142 MG/DL (ref 70–99)
HCT VFR BLD AUTO: 49.7 %
HGB BLD-MCNC: 16.8 G/DL
IMM GRANULOCYTES # BLD AUTO: 0.33 X10(3) UL (ref 0–1)
IMM GRANULOCYTES NFR BLD: 2 %
LYMPHOCYTES # BLD AUTO: 0.37 X10(3) UL (ref 1–4)
LYMPHOCYTES NFR BLD AUTO: 2.2 %
MCH RBC QN AUTO: 31.2 PG (ref 26–34)
MCHC RBC AUTO-ENTMCNC: 33.8 G/DL (ref 31–37)
MCV RBC AUTO: 92.4 FL
MONOCYTES # BLD AUTO: 1.1 X10(3) UL (ref 0.1–1)
MONOCYTES NFR BLD AUTO: 6.6 %
NEUTROPHILS # BLD AUTO: 14.83 X10 (3) UL (ref 1.5–7.7)
NEUTROPHILS # BLD AUTO: 14.83 X10(3) UL (ref 1.5–7.7)
NEUTROPHILS NFR BLD AUTO: 88.8 %
OSMOLALITY SERPL CALC.SUM OF ELEC: 279 MOSM/KG (ref 275–295)
OSMOLALITY UR: 731 MOSM/KG (ref 300–1100)
PLATELET # BLD AUTO: 290 10(3)UL (ref 150–450)
POTASSIUM SERPL-SCNC: 3.8 MMOL/L (ref 3.5–5.1)
PROCALCITONIN SERPL-MCNC: 0.05 NG/ML (ref ?–0.16)
RBC # BLD AUTO: 5.38 X10(6)UL
SODIUM SERPL-SCNC: 121 MMOL/L
SODIUM SERPL-SCNC: 131 MMOL/L (ref 136–145)
URATE SERPL-MCNC: 3.8 MG/DL
WBC # BLD AUTO: 16.7 X10(3) UL (ref 4–11)

## 2022-08-15 PROCEDURE — 99233 SBSQ HOSP IP/OBS HIGH 50: CPT | Performed by: HOSPITALIST

## 2022-08-15 PROCEDURE — 99232 SBSQ HOSP IP/OBS MODERATE 35: CPT | Performed by: OTHER

## 2022-08-15 PROCEDURE — 70450 CT HEAD/BRAIN W/O DYE: CPT | Performed by: OTHER

## 2022-08-15 PROCEDURE — 99233 SBSQ HOSP IP/OBS HIGH 50: CPT | Performed by: INTERNAL MEDICINE

## 2022-08-15 PROCEDURE — 99221 1ST HOSP IP/OBS SF/LOW 40: CPT | Performed by: NEUROLOGICAL SURGERY

## 2022-08-15 RX ORDER — DEXAMETHASONE 4 MG/1
4 TABLET ORAL DAILY
Status: DISCONTINUED | OUTPATIENT
Start: 2022-08-16 | End: 2022-08-16

## 2022-08-15 NOTE — PLAN OF CARE
Problem: Patient Centered Care  Goal: Patient preferences are identified and integrated in the patient's plan of care  Description: Interventions:  - What would you like us to know as we care for you? I live at home with my wife. - Provide timely, complete, and accurate information to patient/family  - Incorporate patient and family knowledge, values, beliefs, and cultural backgrounds into the planning and delivery of care  - Encourage patient/family to participate in care and decision-making at the level they choose  - Honor patient and family perspectives and choices  Outcome: Progressing     Problem: CARDIOVASCULAR - ADULT  Goal: Maintains optimal cardiac output and hemodynamic stability  Description: INTERVENTIONS:  - Monitor vital signs, rhythm, and trends  - Monitor for bleeding, hypotension and signs of decreased cardiac output  - Evaluate effectiveness of vasoactive medications to optimize hemodynamic stability  - Monitor arterial and/or venous puncture sites for bleeding and/or hematoma  - Assess quality of pulses, skin color and temperature  - Assess for signs of decreased coronary artery perfusion - ex.  Angina  - Evaluate fluid balance, assess for edema, trend weights  Outcome: Progressing  Goal: Absence of cardiac arrhythmias or at baseline  Description: INTERVENTIONS:  - Continuous cardiac monitoring, monitor vital signs, obtain 12 lead EKG if indicated  - Evaluate effectiveness of antiarrhythmic and heart rate control medications as ordered  - Initiate emergency measures for life threatening arrhythmias  - Monitor electrolytes and administer replacement therapy as ordered  Outcome: Progressing

## 2022-08-15 NOTE — OCCUPATIONAL THERAPY NOTE
Chart reviewed. Not appropriate for therapy at this time due to a worsening of COVID symptoms per RN. Will attempt to see patient tomorrow. RN aware and agreeable.        Gwendolyn Lim, [address] [place] [address]  #[id number]

## 2022-08-15 NOTE — PLAN OF CARE
Problem: Patient Centered Care  Goal: Patient preferences are identified and integrated in the patient's plan of care  Description: Interventions:  - What would you like us to know as we care for you? I live at home with my wife. - Provide timely, complete, and accurate information to patient/family  - Incorporate patient and family knowledge, values, beliefs, and cultural backgrounds into the planning and delivery of care  - Encourage patient/family to participate in care and decision-making at the level they choose  - Honor patient and family perspectives and choices  Outcome: Progressing     Problem: Patient/Family Goals  Goal: Patient/Family Long Term Goal  Description: Patient's Long Term Goal: to feel better    Interventions:  - ween O2  - steroids  _ Pulm consult  - See additional Care Plan goals for specific interventions  Outcome: Progressing  Goal: Patient/Family Short Term Goal  Description: Patient's Short Term Goal: to return home safe     Interventions:   - ween O2  - steroids  - Pulm consult  - See additional Care Plan goals for specific interventions  Outcome: Progressing    Patient is alert and oriented, Ra, VSS, no complaints of pain. No neuro deficits noted. Skin intact. Bed in lowest position, call light within reach.

## 2022-08-15 NOTE — PHYSICAL THERAPY NOTE
Chart reviewed, patient with neurosurgery consult placed in chart. Also not appropriate for physical therapy at this time due to a worsening of COVID symptoms per RN. Will attempt to see patient tomorrow for physical therapy session. RN aware and agreeable.

## 2022-08-15 NOTE — PLAN OF CARE
Problem: Patient Centered Care  Goal: Patient preferences are identified and integrated in the patient's plan of care  Description: Interventions:  - What would you like us to know as we care for you? I live at home with my wife. - Provide timely, complete, and accurate information to patient/family  - Incorporate patient and family knowledge, values, beliefs, and cultural backgrounds into the planning and delivery of care  - Encourage patient/family to participate in care and decision-making at the level they choose  - Honor patient and family perspectives and choices  Outcome: Progressing     Problem: Patient/Family Goals  Goal: Patient/Family Long Term Goal  Description: Patient's Long Term Goal: to feel better    Interventions:  - ween O2  - steroids  _ Pulm consult  - See additional Care Plan goals for specific interventions  Outcome: Progressing  Goal: Patient/Family Short Term Goal  Description: Patient's Short Term Goal: to return home safe     Interventions:   - ween O2  - steroids  - Pulm consult  - See additional Care Plan goals for specific interventions  Outcome: Progressing     Problem: CARDIOVASCULAR - ADULT  Goal: Maintains optimal cardiac output and hemodynamic stability  Description: INTERVENTIONS:  - Monitor vital signs, rhythm, and trends  - Monitor for bleeding, hypotension and signs of decreased cardiac output  - Evaluate effectiveness of vasoactive medications to optimize hemodynamic stability  - Monitor arterial and/or venous puncture sites for bleeding and/or hematoma  - Assess quality of pulses, skin color and temperature  - Assess for signs of decreased coronary artery perfusion - ex.  Angina  - Evaluate fluid balance, assess for edema, trend weights  Outcome: Progressing  Goal: Absence of cardiac arrhythmias or at baseline  Description: INTERVENTIONS:  - Continuous cardiac monitoring, monitor vital signs, obtain 12 lead EKG if indicated  - Evaluate effectiveness of antiarrhythmic and heart rate control medications as ordered  - Initiate emergency measures for life threatening arrhythmias  - Monitor electrolytes and administer replacement therapy as ordered  Outcome: Progressing     Problem: RESPIRATORY - ADULT  Goal: Achieves optimal ventilation and oxygenation  Description: INTERVENTIONS:  - Assess for changes in respiratory status  - Assess for changes in mentation and behavior  - Position to facilitate oxygenation and minimize respiratory effort  - Oxygen supplementation based on oxygen saturation or ABGs  - Provide Smoking Cessation handout, if applicable  - Encourage broncho-pulmonary hygiene including cough, deep breathe, Incentive Spirometry  - Assess the need for suctioning and perform as needed  - Assess and instruct to report SOB or any respiratory difficulty  - Respiratory Therapy support as indicated  - Manage/alleviate anxiety  - Monitor for signs/symptoms of CO2 retention  Outcome: Progressing

## 2022-08-15 NOTE — CM/SW NOTE
"Pt has been off BiPap and oxygen all day. Has been up to the bathroom every couple hours for loose stools but pt states this is her \"normal.\" Was up in the chair for breakfast and again this afternoon, transfers with SBA. Has some small instances of feeling slightly dyspneic after exertion but this resolves with rest. Catheter remains in place, urine output is poor-fair. MD updated. Pt states this is her \"normal\" also as drinking too many PO fluids makes her nauseated but states she will continue to do what she can. BP continues to be soft but more stable than yesterday. Plan to d/c catheter this afternoon and likely change patient to med/surg status once hospitalist rounds. Pt currently sitting up in the chair visiting with family. Call light within reach.   " 08/15/22 1600   Discharge Needs   Anticipated D/C needs Acute rehab   Choice of Post-Acute Provider   Informed patient of right to choose their preferred provider Yes   List of appropriate post-acute services provided to patient/family with quality data Yes     Provided list of available Acute Rehabs to pt/wife. Will need choice    / to remain available for support and/or discharge planning.      Wood Arreola MBA MSN, RN CTL/  [id number]

## 2022-08-15 NOTE — CDS QUERY
How to Answer this Query    1.) Click \"Edit Button\" on the toolbar  2.) Type an \"X\" in the bracket for the diagnosis that applies. (You may also add additional clinical details as you feel necessary to substantiate your response). 3.) Finally click \"Sign\" to complete response. Thank you     Present on Admission (POA)  [facility]    Dear Doctor Oviedo Marrow:  Clinical information (provided below) does not conclusively specify if the condition was Present On Admission (POA). In order to apply the POA indicator to the final set of ICD-10-CM diagnosis codes that reflects severity of illness and risk of mortality,  SELECTION BY PROVIDER ONLY  WAS   CVA  PRESENT ON ADMISSION (POA)?      ( )  Yes, it was present on admission. ( )  No, was not present on admission. ( )  It is not possible to clinically determine whether it was present on admission or not. ( )  Other (please specify):     ( )  Unable to Determine (please comment)         H&P:   admitted to the hospital with diaphoresis, nausea and vomiting, and accelerated hypertension. 8-7 Cardiology consult:   Impression:  1. hypertension  2. coronary artery disease s/p PCI  3. N/V    8-11 Neurology consult:   presents to the hospital with vomiting and was found to be COVID-positive and significantly hypertensive. Neurology is consulted for persistent vertigo and evaluation by primary team revealed a large sized left cerebellar ischemic stroke. ASSESSMENT:  Large left cerebellar ischemic stroke likely large vessel to vessel from left vertebral artery long segment occlusion, may be hypercoagulable from COVID    8-7 Admit BPs: 202/98 ,  191/88,  185/96    8-7 CT Brain: 1.  No acute intracranial process    8-10 MRI Brain: CONCLUSION: Acute non hemorrhagic left cerebellar hemisphere infarction with accompanying edema, localized mass effect, including mass effect on the 4th ventricle    Meds include: Apresoline prn q 4 hours start 8-7 w/ 2 doses and 1 dose on 8-8        If you have any questions, please contact Clinical :  Sunny De La Rosa RN CCDS at [phone]     Thank You!     THIS FORM IS A PERMANENT PART OF THE MEDICAL RECORD

## 2022-08-16 LAB
ANION GAP SERPL CALC-SCNC: 10 MMOL/L (ref 0–18)
BUN BLD-MCNC: 32 MG/DL (ref 7–18)
BUN/CREAT SERPL: 36.4 (ref 10–20)
CALCIUM BLD-MCNC: 9.1 MG/DL (ref 8.5–10.1)
CHLORIDE SERPL-SCNC: 95 MMOL/L (ref 98–112)
CO2 SERPL-SCNC: 26 MMOL/L (ref 21–32)
CREAT BLD-MCNC: 0.88 MG/DL
DEPRECATED RDW RBC AUTO: 44.6 FL (ref 35.1–46.3)
ERYTHROCYTE [DISTWIDTH] IN BLOOD BY AUTOMATED COUNT: 13.2 % (ref 11–15)
GFR SERPLBLD BASED ON 1.73 SQ M-ARVRAT: 90 ML/MIN/1.73M2 (ref 60–?)
GLUCOSE BLD-MCNC: 117 MG/DL (ref 70–99)
HCT VFR BLD AUTO: 49.9 %
HGB BLD-MCNC: 16.8 G/DL
MCH RBC QN AUTO: 31.1 PG (ref 26–34)
MCHC RBC AUTO-ENTMCNC: 33.7 G/DL (ref 31–37)
MCV RBC AUTO: 92.2 FL
OSMOLALITY SERPL CALC.SUM OF ELEC: 280 MOSM/KG (ref 275–295)
PLATELET # BLD AUTO: 305 10(3)UL (ref 150–450)
POTASSIUM SERPL-SCNC: 3.7 MMOL/L (ref 3.5–5.1)
RBC # BLD AUTO: 5.41 X10(6)UL
SODIUM SERPL-SCNC: 131 MMOL/L (ref 136–145)
WBC # BLD AUTO: 18.6 X10(3) UL (ref 4–11)

## 2022-08-16 PROCEDURE — 99233 SBSQ HOSP IP/OBS HIGH 50: CPT | Performed by: HOSPITALIST

## 2022-08-16 PROCEDURE — 99231 SBSQ HOSP IP/OBS SF/LOW 25: CPT | Performed by: OTHER

## 2022-08-16 PROCEDURE — 99232 SBSQ HOSP IP/OBS MODERATE 35: CPT | Performed by: INTERNAL MEDICINE

## 2022-08-16 RX ORDER — ONDANSETRON 4 MG/1
4 TABLET, FILM COATED ORAL
Status: DISCONTINUED | OUTPATIENT
Start: 2022-08-16 | End: 2022-08-19

## 2022-08-16 RX ORDER — SODIUM CHLORIDE 9 MG/ML
INJECTION, SOLUTION INTRAVENOUS CONTINUOUS
Status: DISCONTINUED | OUTPATIENT
Start: 2022-08-16 | End: 2022-08-19

## 2022-08-16 RX ORDER — DEXAMETHASONE 2 MG/1
2 TABLET ORAL DAILY
Status: DISCONTINUED | OUTPATIENT
Start: 2022-08-17 | End: 2022-08-17

## 2022-08-16 RX ORDER — POTASSIUM CHLORIDE 20 MEQ/1
40 TABLET, EXTENDED RELEASE ORAL ONCE
Status: COMPLETED | OUTPATIENT
Start: 2022-08-16 | End: 2022-08-16

## 2022-08-16 NOTE — CM/SW NOTE
Per Nubia Luther, acceptance day of Covid (+) pts is on a case by case basis. Nubia Luther reviewing case with her peers. CM followed up with pts wife. She agrees to a dc plan of AR at [facility]. She is requesting to tour it first.  CM encourage her to tour tonight/ Tomorrow as pt is getting closer to dc. CM also reached out to [facility] and requested she reach out to pts wife to assist with answering questions and facilitate arranging a tour tomorrow. Sarahy Jamison.  Armando Garcia RN, BSN  Nurse   [phone]

## 2022-08-16 NOTE — CM/SW NOTE
Pt will need to be off covid isolation, maintain RA sats >92% on O2 @ 4LNC to go to Acute rehab of choice. Pt can dc to to [facility] and [address] [place] [address] prior to discontinuing isolation if bed is available. Tonya .  Roscoe Duarte RN, BSN  Nurse   [phone]

## 2022-08-16 NOTE — PLAN OF CARE
Problem: Patient Centered Care  Goal: Patient preferences are identified and integrated in the patient's plan of care  Description: Interventions:  - What would you like us to know as we care for you? I live at home with my wife. - Provide timely, complete, and accurate information to patient/family  - Incorporate patient and family knowledge, values, beliefs, and cultural backgrounds into the planning and delivery of care  - Encourage patient/family to participate in care and decision-making at the level they choose  - Honor patient and family perspectives and choices  Outcome: Progressing     Problem: Patient/Family Goals  Goal: Patient/Family Long Term Goal  Description: Patient's Long Term Goal: to feel better    Interventions:  - ween O2  - steroids  _ Pulm consult  - See additional Care Plan goals for specific interventions  Outcome: Progressing  Goal: Patient/Family Short Term Goal  Description: Patient's Short Term Goal: to return home safe     Interventions:   - ween O2  - steroids  - Pulm consult  - See additional Care Plan goals for specific interventions  Outcome: Progressing     Problem: CARDIOVASCULAR - ADULT  Goal: Maintains optimal cardiac output and hemodynamic stability  Description: INTERVENTIONS:  - Monitor vital signs, rhythm, and trends  - Monitor for bleeding, hypotension and signs of decreased cardiac output  - Evaluate effectiveness of vasoactive medications to optimize hemodynamic stability  - Monitor arterial and/or venous puncture sites for bleeding and/or hematoma  - Assess quality of pulses, skin color and temperature  - Assess for signs of decreased coronary artery perfusion - ex.  Angina  - Evaluate fluid balance, assess for edema, trend weights  Outcome: Progressing  Goal: Absence of cardiac arrhythmias or at baseline  Description: INTERVENTIONS:  - Continuous cardiac monitoring, monitor vital signs, obtain 12 lead EKG if indicated  - Evaluate effectiveness of antiarrhythmic and heart rate control medications as ordered  - Initiate emergency measures for life threatening arrhythmias  - Monitor electrolytes and administer replacement therapy as ordered  Outcome: Progressing     Problem: RESPIRATORY - ADULT  Goal: Achieves optimal ventilation and oxygenation  Description: INTERVENTIONS:  - Assess for changes in respiratory status  - Assess for changes in mentation and behavior  - Position to facilitate oxygenation and minimize respiratory effort  - Oxygen supplementation based on oxygen saturation or ABGs  - Provide Smoking Cessation handout, if applicable  - Encourage broncho-pulmonary hygiene including cough, deep breathe, Incentive Spirometry  - Assess the need for suctioning and perform as needed  - Assess and instruct to report SOB or any respiratory difficulty  - Respiratory Therapy support as indicated  - Manage/alleviate anxiety  - Monitor for signs/symptoms of CO2 retention  Outcome: Progressing     Pt aox4. RA. 32L via  nc. Vss. Call light within reach.

## 2022-08-16 NOTE — PLAN OF CARE
Problem: Patient Centered Care  Goal: Patient preferences are identified and integrated in the patient's plan of care  Description: Interventions:  - What would you like us to know as we care for you? I live at home with my wife. - Provide timely, complete, and accurate information to patient/family  - Incorporate patient and family knowledge, values, beliefs, and cultural backgrounds into the planning and delivery of care  - Encourage patient/family to participate in care and decision-making at the level they choose  - Honor patient and family perspectives and choices  Outcome: Progressing     Problem: Patient/Family Goals  Goal: Patient/Family Long Term Goal  Description: Patient's Long Term Goal: to feel better    Interventions:  - ween O2  - steroids  _ Pulm consult  - See additional Care Plan goals for specific interventions  Outcome: Progressing  Goal: Patient/Family Short Term Goal  Description: Patient's Short Term Goal: to return home safe     Interventions:   - ween O2  - steroids  - Pulm consult  - See additional Care Plan goals for specific interventions  Outcome: Progressing     Problem: CARDIOVASCULAR - ADULT  Goal: Maintains optimal cardiac output and hemodynamic stability  Description: INTERVENTIONS:  - Monitor vital signs, rhythm, and trends  - Monitor for bleeding, hypotension and signs of decreased cardiac output  - Evaluate effectiveness of vasoactive medications to optimize hemodynamic stability  - Monitor arterial and/or venous puncture sites for bleeding and/or hematoma  - Assess quality of pulses, skin color and temperature  - Assess for signs of decreased coronary artery perfusion - ex.  Angina  - Evaluate fluid balance, assess for edema, trend weights  Outcome: Progressing  Goal: Absence of cardiac arrhythmias or at baseline  Description: INTERVENTIONS:  - Continuous cardiac monitoring, monitor vital signs, obtain 12 lead EKG if indicated  - Evaluate effectiveness of antiarrhythmic and heart rate control medications as ordered  - Initiate emergency measures for life threatening arrhythmias  - Monitor electrolytes and administer replacement therapy as ordered  Outcome: Progressing     Problem: RESPIRATORY - ADULT  Goal: Achieves optimal ventilation and oxygenation  Description: INTERVENTIONS:  - Assess for changes in respiratory status  - Assess for changes in mentation and behavior  - Position to facilitate oxygenation and minimize respiratory effort  - Oxygen supplementation based on oxygen saturation or ABGs  - Provide Smoking Cessation handout, if applicable  - Encourage broncho-pulmonary hygiene including cough, deep breathe, Incentive Spirometry  - Assess the need for suctioning and perform as needed  - Assess and instruct to report SOB or any respiratory difficulty  - Respiratory Therapy support as indicated  - Manage/alleviate anxiety  - Monitor for signs/symptoms of CO2 retention  Outcome: Progressing     Problem: NEUROLOGICAL - ADULT  Goal: Achieves stable or improved neurological status  Description: INTERVENTIONS  - Assess for and report changes in neurological status  - Initiate measures to prevent increased intracranial pressure  - Maintain blood pressure and fluid volume within ordered parameters to optimize cerebral perfusion and minimize risk of hemorrhage  - Monitor temperature, glucose, and sodium.  Initiate appropriate interventions as ordered  Outcome: Progressing  Goal: Absence of seizures  Description: INTERVENTIONS  - Monitor for seizure activity  - Administer anti-seizure medications as ordered  - Monitor neurological status  Outcome: Progressing  Goal: Remains free of injury related to seizure activity  Description: INTERVENTIONS:  - Maintain airway, patient safety  and administer oxygen as ordered  - Monitor patient for seizure activity, document and report duration and description of seizure to MD/LIP  - If seizure occurs, turn patient to side and suction secretions as needed  - Reorient patient post seizure  - Seizure pads on all 4 side rails  - Instruct patient/family to notify RN of any seizure activity  - Instruct patient/family to call for assistance with activity based on assessment  Outcome: Progressing  Goal: Achieves maximal functionality and self care  Description: INTERVENTIONS  - Monitor swallowing and airway patency with patient fatigue and changes in neurological status  - Encourage and assist patient to increase activity and self care with guidance from PT/OT  - Encourage visually impaired, hearing impaired and aphasic patients to use assistive/communication devices  Outcome: Progressing    Patient is alert and oriented, 3L NC, VSS, Po zofran given, Orthostatic BP positive, janee hose placed on and IV fluids restarted. Q shift neuro checks. Bed in lowest position, call light within reach.

## 2022-08-16 NOTE — DIETARY NOTE
Brief Nutrition Note:    Pt consulted r/t poor po intake. Discussed with RN, pt with minimum intake. Pt currently on Cardiac diet with 1200ml fluid restriction. Ensure [employer] and [employer] ONS ordered. PPE utilized during interaction. Pt sleeping and appears comfortable. Discussed intake with wife, pt ate 1/2 chicken salad sandwich without the crust and wife encouraged pt to drink Ensure Compact, able to get about 75% of supplement in but pt made face while drinking. Offered alternative to which wife agreed to. Adj ONS order. Discussed lifting cardiac diet restrictions to help increase po intake with Dr Tammi Aleman, with approval. Liberalized diet. Encouraged wife to continue providing encouragement during meals to help increase po intake. F/u per protocol.     Wt Readings from Last 6 Encounters:  08/16/22 : 104.7 kg (230 lb 12.8 oz)  07/12/22 : 104.3 kg (230 lb)  07/05/22 : 104.3 kg (230 lb)  05/06/22 : 106.9 kg (235 lb 9 oz)  01/07/22 : 102.5 kg (226 lb)  12/07/21 : 103 kg (227 lb)    Percent Meals Eaten (last 6 days)     Date/Time Percent Meals Eaten (%)    08/10/22 1030 50 %    08/10/22 1530 100 %    08/13/22 1000 0 %    08/13/22 1200 0 %        Chang Goodson MS, CAITY, RDN, LDN  Clinical Dietitian  P: [phone]

## 2022-08-17 LAB — POTASSIUM SERPL-SCNC: 4.1 MMOL/L (ref 3.5–5.1)

## 2022-08-17 PROCEDURE — 99233 SBSQ HOSP IP/OBS HIGH 50: CPT | Performed by: HOSPITALIST

## 2022-08-17 PROCEDURE — 99232 SBSQ HOSP IP/OBS MODERATE 35: CPT | Performed by: PHYSICIAN ASSISTANT

## 2022-08-17 RX ORDER — LOSARTAN POTASSIUM 100 MG/1
100 TABLET ORAL DAILY
Status: DISCONTINUED | OUTPATIENT
Start: 2022-08-18 | End: 2022-08-18

## 2022-08-17 RX ORDER — DEXAMETHASONE 2 MG/1
2 TABLET ORAL DAILY
Status: COMPLETED | OUTPATIENT
Start: 2022-08-17 | End: 2022-08-18

## 2022-08-17 RX ORDER — LOSARTAN POTASSIUM 50 MG/1
50 TABLET ORAL DAILY
Status: DISCONTINUED | OUTPATIENT
Start: 2022-08-18 | End: 2022-08-17

## 2022-08-18 LAB
ANION GAP SERPL CALC-SCNC: 8 MMOL/L (ref 0–18)
BUN BLD-MCNC: 38 MG/DL (ref 7–18)
BUN/CREAT SERPL: 36.2 (ref 10–20)
CALCIUM BLD-MCNC: 8.8 MG/DL (ref 8.5–10.1)
CHLORIDE SERPL-SCNC: 97 MMOL/L (ref 98–112)
CO2 SERPL-SCNC: 24 MMOL/L (ref 21–32)
CREAT BLD-MCNC: 1.05 MG/DL
DEPRECATED RDW RBC AUTO: 44.5 FL (ref 35.1–46.3)
ERYTHROCYTE [DISTWIDTH] IN BLOOD BY AUTOMATED COUNT: 13 % (ref 11–15)
GFR SERPLBLD BASED ON 1.73 SQ M-ARVRAT: 74 ML/MIN/1.73M2 (ref 60–?)
GLUCOSE BLD-MCNC: 136 MG/DL (ref 70–99)
HCT VFR BLD AUTO: 50.7 %
HGB BLD-MCNC: 17.4 G/DL
MCH RBC QN AUTO: 31.8 PG (ref 26–34)
MCHC RBC AUTO-ENTMCNC: 34.3 G/DL (ref 31–37)
MCV RBC AUTO: 92.7 FL
OSMOLALITY SERPL CALC.SUM OF ELEC: 279 MOSM/KG (ref 275–295)
PLATELET # BLD AUTO: 240 10(3)UL (ref 150–450)
POTASSIUM SERPL-SCNC: 3.4 MMOL/L (ref 3.5–5.1)
POTASSIUM SERPL-SCNC: 5 MMOL/L (ref 3.5–5.1)
RBC # BLD AUTO: 5.47 X10(6)UL
SODIUM SERPL-SCNC: 129 MMOL/L (ref 136–145)
WBC # BLD AUTO: 14.9 X10(3) UL (ref 4–11)

## 2022-08-18 PROCEDURE — 99232 SBSQ HOSP IP/OBS MODERATE 35: CPT | Performed by: PHYSICIAN ASSISTANT

## 2022-08-18 RX ORDER — POTASSIUM CHLORIDE 20 MEQ/1
40 TABLET, EXTENDED RELEASE ORAL EVERY 4 HOURS
Status: COMPLETED | OUTPATIENT
Start: 2022-08-18 | End: 2022-08-18

## 2022-08-18 RX ORDER — GUAIFENESIN 600 MG
600 TABLET, EXTENDED RELEASE 12 HR ORAL 2 TIMES DAILY PRN
Refills: 0 | Status: SHIPPED | COMMUNITY
Start: 2022-08-18

## 2022-08-18 RX ORDER — PANTOPRAZOLE SODIUM 40 MG/1
40 TABLET, DELAYED RELEASE ORAL
Refills: 0 | Status: SHIPPED | COMMUNITY
Start: 2022-08-19

## 2022-08-18 NOTE — PLAN OF CARE
Patient a/o x4, slightly hard of hearing, on room air. Orthostatic BP Q shift done, positive, MD aware. Last dose of decadron given. Losartan and verapamil discontinued. Ambulates with x1 assist with walker to bathroom. Neuro check and NIHSS done. Pt has no complaints at this time. Fall precautions maintained, call light in reach, bed locked in low position. Problem: Patient Centered Care  Goal: Patient preferences are identified and integrated in the patient's plan of care  Description: Interventions:  - What would you like us to know as we care for you? I live at home with my wife.    - Provide timely, complete, and accurate information to patient/family  - Incorporate patient and family knowledge, values, beliefs, and cultural backgrounds into the planning and delivery of care  - Encourage patient/family to participate in care and decision-making at the level they choose  - Honor patient and family perspectives and choices  Outcome: Progressing     Problem: Patient/Family Goals  Goal: Patient/Family Long Term Goal  Description: Patient's Long Term Goal: to feel better    Interventions:  - ween O2  - steroids  _ Pulm consult  - See additional Care Plan goals for specific interventions  Outcome: Progressing  Goal: Patient/Family Short Term Goal  Description: Patient's Short Term Goal: to return home safe     Interventions:   - ween O2  - steroids  - Pulm consult  - See additional Care Plan goals for specific interventions  Outcome: Progressing     Problem: CARDIOVASCULAR - ADULT  Goal: Maintains optimal cardiac output and hemodynamic stability  Description: INTERVENTIONS:  - Monitor vital signs, rhythm, and trends  - Monitor for bleeding, hypotension and signs of decreased cardiac output  - Evaluate effectiveness of vasoactive medications to optimize hemodynamic stability  - Monitor arterial and/or venous puncture sites for bleeding and/or hematoma  - Assess quality of pulses, skin color and temperature  - Assess for signs of decreased coronary artery perfusion - ex. Angina  - Evaluate fluid balance, assess for edema, trend weights  Outcome: Progressing  Goal: Absence of cardiac arrhythmias or at baseline  Description: INTERVENTIONS:  - Continuous cardiac monitoring, monitor vital signs, obtain 12 lead EKG if indicated  - Evaluate effectiveness of antiarrhythmic and heart rate control medications as ordered  - Initiate emergency measures for life threatening arrhythmias  - Monitor electrolytes and administer replacement therapy as ordered  Outcome: Progressing     Problem: RESPIRATORY - ADULT  Goal: Achieves optimal ventilation and oxygenation  Description: INTERVENTIONS:  - Assess for changes in respiratory status  - Assess for changes in mentation and behavior  - Position to facilitate oxygenation and minimize respiratory effort  - Oxygen supplementation based on oxygen saturation or ABGs  - Provide Smoking Cessation handout, if applicable  - Encourage broncho-pulmonary hygiene including cough, deep breathe, Incentive Spirometry  - Assess the need for suctioning and perform as needed  - Assess and instruct to report SOB or any respiratory difficulty  - Respiratory Therapy support as indicated  - Manage/alleviate anxiety  - Monitor for signs/symptoms of CO2 retention  Outcome: Progressing     Problem: NEUROLOGICAL - ADULT  Goal: Achieves stable or improved neurological status  Description: INTERVENTIONS  - Assess for and report changes in neurological status  - Initiate measures to prevent increased intracranial pressure  - Maintain blood pressure and fluid volume within ordered parameters to optimize cerebral perfusion and minimize risk of hemorrhage  - Monitor temperature, glucose, and sodium.  Initiate appropriate interventions as ordered  Outcome: Progressing  Goal: Absence of seizures  Description: INTERVENTIONS  - Monitor for seizure activity  - Administer anti-seizure medications as ordered  - Monitor neurological status  Outcome: Progressing  Goal: Remains free of injury related to seizure activity  Description: INTERVENTIONS:  - Maintain airway, patient safety  and administer oxygen as ordered  - Monitor patient for seizure activity, document and report duration and description of seizure to MD/LIP  - If seizure occurs, turn patient to side and suction secretions as needed  - Reorient patient post seizure  - Seizure pads on all 4 side rails  - Instruct patient/family to notify RN of any seizure activity  - Instruct patient/family to call for assistance with activity based on assessment  Outcome: Progressing  Goal: Achieves maximal functionality and self care  Description: INTERVENTIONS  - Monitor swallowing and airway patency with patient fatigue and changes in neurological status  - Encourage and assist patient to increase activity and self care with guidance from PT/OT  - Encourage visually impaired, hearing impaired and aphasic patients to use assistive/communication devices  Outcome: Progressing

## 2022-08-18 NOTE — PLAN OF CARE
Problem: Patient Centered Care  Goal: Patient preferences are identified and integrated in the patient's plan of care  Description: Interventions:  - What would you like us to know as we care for you? I live at home with my wife. - Provide timely, complete, and accurate information to patient/family  - Incorporate patient and family knowledge, values, beliefs, and cultural backgrounds into the planning and delivery of care  - Encourage patient/family to participate in care and decision-making at the level they choose  - Honor patient and family perspectives and choices  Outcome: Progressing     Problem: Patient/Family Goals  Goal: Patient/Family Long Term Goal  Description: Patient's Long Term Goal: to feel better    Interventions:  - ween O2  - steroids  _ Pulm consult  - See additional Care Plan goals for specific interventions  Outcome: Progressing  Goal: Patient/Family Short Term Goal  Description: Patient's Short Term Goal: to return home safe     Interventions:   - ween O2  - steroids  - Pulm consult  - See additional Care Plan goals for specific interventions  Outcome: Progressing     Problem: CARDIOVASCULAR - ADULT  Goal: Maintains optimal cardiac output and hemodynamic stability  Description: INTERVENTIONS:  - Monitor vital signs, rhythm, and trends  - Monitor for bleeding, hypotension and signs of decreased cardiac output  - Evaluate effectiveness of vasoactive medications to optimize hemodynamic stability  - Monitor arterial and/or venous puncture sites for bleeding and/or hematoma  - Assess quality of pulses, skin color and temperature  - Assess for signs of decreased coronary artery perfusion - ex.  Angina  - Evaluate fluid balance, assess for edema, trend weights  Outcome: Progressing  Goal: Absence of cardiac arrhythmias or at baseline  Description: INTERVENTIONS:  - Continuous cardiac monitoring, monitor vital signs, obtain 12 lead EKG if indicated  - Evaluate effectiveness of antiarrhythmic and heart rate control medications as ordered  - Initiate emergency measures for life threatening arrhythmias  - Monitor electrolytes and administer replacement therapy as ordered  Outcome: Progressing     Problem: RESPIRATORY - ADULT  Goal: Achieves optimal ventilation and oxygenation  Description: INTERVENTIONS:  - Assess for changes in respiratory status  - Assess for changes in mentation and behavior  - Position to facilitate oxygenation and minimize respiratory effort  - Oxygen supplementation based on oxygen saturation or ABGs  - Provide Smoking Cessation handout, if applicable  - Encourage broncho-pulmonary hygiene including cough, deep breathe, Incentive Spirometry  - Assess the need for suctioning and perform as needed  - Assess and instruct to report SOB or any respiratory difficulty  - Respiratory Therapy support as indicated  - Manage/alleviate anxiety  - Monitor for signs/symptoms of CO2 retention  Outcome: Progressing     Problem: NEUROLOGICAL - ADULT  Goal: Achieves stable or improved neurological status  Description: INTERVENTIONS  - Assess for and report changes in neurological status  - Initiate measures to prevent increased intracranial pressure  - Maintain blood pressure and fluid volume within ordered parameters to optimize cerebral perfusion and minimize risk of hemorrhage  - Monitor temperature, glucose, and sodium.  Initiate appropriate interventions as ordered  Outcome: Progressing  Goal: Absence of seizures  Description: INTERVENTIONS  - Monitor for seizure activity  - Administer anti-seizure medications as ordered  - Monitor neurological status  Outcome: Progressing  Goal: Remains free of injury related to seizure activity  Description: INTERVENTIONS:  - Maintain airway, patient safety  and administer oxygen as ordered  - Monitor patient for seizure activity, document and report duration and description of seizure to MD/LIP  - If seizure occurs, turn patient to side and suction secretions as needed  - Reorient patient post seizure  - Seizure pads on all 4 side rails  - Instruct patient/family to notify RN of any seizure activity  - Instruct patient/family to call for assistance with activity based on assessment  Outcome: Progressing  Goal: Achieves maximal functionality and self care  Description: INTERVENTIONS  - Monitor swallowing and airway patency with patient fatigue and changes in neurological status  - Encourage and assist patient to increase activity and self care with guidance from PT/OT  - Encourage visually impaired, hearing impaired and aphasic patients to use assistive/communication devices  Outcome: Progressing     Aox4. RA. Orthos, qshift. Up x1, w/wlkr. Plan for dx to [facility] once med cleared. Call light within reach.

## 2022-08-19 VITALS
TEMPERATURE: 98 F | WEIGHT: 213.31 LBS | OXYGEN SATURATION: 94 % | DIASTOLIC BLOOD PRESSURE: 64 MMHG | HEART RATE: 114 BPM | BODY MASS INDEX: 33 KG/M2 | RESPIRATION RATE: 18 BRPM | SYSTOLIC BLOOD PRESSURE: 113 MMHG

## 2022-08-19 PROCEDURE — 99239 HOSP IP/OBS DSCHRG MGMT >30: CPT | Performed by: HOSPITALIST

## 2022-08-19 RX ORDER — ATORVASTATIN CALCIUM 80 MG/1
80 TABLET, FILM COATED ORAL NIGHTLY
Qty: 30 TABLET | Refills: 0 | Status: SHIPPED | OUTPATIENT
Start: 2022-08-19

## 2022-08-19 NOTE — CM/SW NOTE
08/19/22 1200   Discharge disposition   Expected discharge disposition [facility] [address] Provider   ([address])   Discharge transportation [facility]     Patient on COVID isolation and will remain on COVID isolation per Yvonne at [facility]. [facility] ambulance (due to isolation needs) set up for a 4pm pick-up. Per dc summary, patient to have a 30 day event monitor placed after discharge from [facility] per follow-up appointment with Dr. Rigoberto Allen. Discussed with wife scheduling of appointment once discharge date is known. Requested that [employer] provide a copy of the AVS to wife for her review. Patient to be picked up at 1600 and will admit to the stroke unit. Report to be called to .     Don Lakhani MBA BSN RN [address]  RN Case Manager  [phone]

## 2022-08-19 NOTE — PLAN OF CARE
Patient a/o x4, on room air. Neuro check and NIHSS done. Ambulates steady to bathroom with x1 assist with walker. BM x1. No complaints at this time. Medically cleared for discharge to [facility] acute rehab. Discharge instructions provided to patient and wife at bedside. Handoff report given to Cooper Bruce RN at rehab facility who had no further questions at this time. Tele, IV, and ID band removed. Pt was picked up by [address] [place] [address] with all belongings for transportation to [facility]. Atorvastatin med prescription sent with pt, no scripts needed for protonix, mucinex, and brilinta per Dr. Librado Sylvester.    Problem: Patient Centered Care  Goal: Patient preferences are identified and integrated in the patient's plan of care  Description: Interventions:  - What would you like us to know as we care for you? I live at home with my wife.    - Provide timely, complete, and accurate information to patient/family  - Incorporate patient and family knowledge, values, beliefs, and cultural backgrounds into the planning and delivery of care  - Encourage patient/family to participate in care and decision-making at the level they choose  - Honor patient and family perspectives and choices  Outcome: Completed     Problem: Patient/Family Goals  Goal: Patient/Family Long Term Goal  Description: Patient's Long Term Goal: to feel better    Interventions:  - ween O2  - steroids  _ Pulm consult  - See additional Care Plan goals for specific interventions  Outcome: Completed  Goal: Patient/Family Short Term Goal  Description: Patient's Short Term Goal: to return home safe     Interventions:   - ween O2  - steroids  - Pulm consult  - See additional Care Plan goals for specific interventions  Outcome: Completed     Problem: CARDIOVASCULAR - ADULT  Goal: Maintains optimal cardiac output and hemodynamic stability  Description: INTERVENTIONS:  - Monitor vital signs, rhythm, and trends  - Monitor for bleeding, hypotension and signs of decreased cardiac output  - Evaluate effectiveness of vasoactive medications to optimize hemodynamic stability  - Monitor arterial and/or venous puncture sites for bleeding and/or hematoma  - Assess quality of pulses, skin color and temperature  - Assess for signs of decreased coronary artery perfusion - ex.  Angina  - Evaluate fluid balance, assess for edema, trend weights  Outcome: Completed  Goal: Absence of cardiac arrhythmias or at baseline  Description: INTERVENTIONS:  - Continuous cardiac monitoring, monitor vital signs, obtain 12 lead EKG if indicated  - Evaluate effectiveness of antiarrhythmic and heart rate control medications as ordered  - Initiate emergency measures for life threatening arrhythmias  - Monitor electrolytes and administer replacement therapy as ordered  Outcome: Completed     Problem: RESPIRATORY - ADULT  Goal: Achieves optimal ventilation and oxygenation  Description: INTERVENTIONS:  - Assess for changes in respiratory status  - Assess for changes in mentation and behavior  - Position to facilitate oxygenation and minimize respiratory effort  - Oxygen supplementation based on oxygen saturation or ABGs  - Provide Smoking Cessation handout, if applicable  - Encourage broncho-pulmonary hygiene including cough, deep breathe, Incentive Spirometry  - Assess the need for suctioning and perform as needed  - Assess and instruct to report SOB or any respiratory difficulty  - Respiratory Therapy support as indicated  - Manage/alleviate anxiety  - Monitor for signs/symptoms of CO2 retention  Outcome: Completed     Problem: NEUROLOGICAL - ADULT  Goal: Achieves stable or improved neurological status  Description: INTERVENTIONS  - Assess for and report changes in neurological status  - Initiate measures to prevent increased intracranial pressure  - Maintain blood pressure and fluid volume within ordered parameters to optimize cerebral perfusion and minimize risk of hemorrhage  - Monitor temperature, glucose, and sodium.  Initiate appropriate interventions as ordered  Outcome: Completed  Goal: Absence of seizures  Description: INTERVENTIONS  - Monitor for seizure activity  - Administer anti-seizure medications as ordered  - Monitor neurological status  Outcome: Completed  Goal: Remains free of injury related to seizure activity  Description: INTERVENTIONS:  - Maintain airway, patient safety  and administer oxygen as ordered  - Monitor patient for seizure activity, document and report duration and description of seizure to MD/LIP  - If seizure occurs, turn patient to side and suction secretions as needed  - Reorient patient post seizure  - Seizure pads on all 4 side rails  - Instruct patient/family to notify RN of any seizure activity  - Instruct patient/family to call for assistance with activity based on assessment  Outcome: Completed  Goal: Achieves maximal functionality and self care  Description: INTERVENTIONS  - Monitor swallowing and airway patency with patient fatigue and changes in neurological status  - Encourage and assist patient to increase activity and self care with guidance from PT/OT  - Encourage visually impaired, hearing impaired and aphasic patients to use assistive/communication devices  Outcome: Completed Initial (On Arrival)

## 2022-08-19 NOTE — PLAN OF CARE
Problem: Patient Centered Care  Goal: Patient preferences are identified and integrated in the patient's plan of care  Description: Interventions:  - What would you like us to know as we care for you? I live at home with my wife. - Provide timely, complete, and accurate information to patient/family  - Incorporate patient and family knowledge, values, beliefs, and cultural backgrounds into the planning and delivery of care  - Encourage patient/family to participate in care and decision-making at the level they choose  - Honor patient and family perspectives and choices  Outcome: Progressing     Problem: Patient/Family Goals  Goal: Patient/Family Long Term Goal  Description: Patient's Long Term Goal: to feel better    Interventions:  - ween O2  - steroids  _ Pulm consult  - See additional Care Plan goals for specific interventions  Outcome: Progressing  Goal: Patient/Family Short Term Goal  Description: Patient's Short Term Goal: to return home safe     Interventions:   - ween O2  - steroids  - Pulm consult  - See additional Care Plan goals for specific interventions  Outcome: Progressing     Problem: CARDIOVASCULAR - ADULT  Goal: Maintains optimal cardiac output and hemodynamic stability  Description: INTERVENTIONS:  - Monitor vital signs, rhythm, and trends  - Monitor for bleeding, hypotension and signs of decreased cardiac output  - Evaluate effectiveness of vasoactive medications to optimize hemodynamic stability  - Monitor arterial and/or venous puncture sites for bleeding and/or hematoma  - Assess quality of pulses, skin color and temperature  - Assess for signs of decreased coronary artery perfusion - ex.  Angina  - Evaluate fluid balance, assess for edema, trend weights  Outcome: Progressing  Goal: Absence of cardiac arrhythmias or at baseline  Description: INTERVENTIONS:  - Continuous cardiac monitoring, monitor vital signs, obtain 12 lead EKG if indicated  - Evaluate effectiveness of antiarrhythmic and heart rate control medications as ordered  - Initiate emergency measures for life threatening arrhythmias  - Monitor electrolytes and administer replacement therapy as ordered  Outcome: Progressing     Problem: RESPIRATORY - ADULT  Goal: Achieves optimal ventilation and oxygenation  Description: INTERVENTIONS:  - Assess for changes in respiratory status  - Assess for changes in mentation and behavior  - Position to facilitate oxygenation and minimize respiratory effort  - Oxygen supplementation based on oxygen saturation or ABGs  - Provide Smoking Cessation handout, if applicable  - Encourage broncho-pulmonary hygiene including cough, deep breathe, Incentive Spirometry  - Assess the need for suctioning and perform as needed  - Assess and instruct to report SOB or any respiratory difficulty  - Respiratory Therapy support as indicated  - Manage/alleviate anxiety  - Monitor for signs/symptoms of CO2 retention  Outcome: Progressing     Problem: NEUROLOGICAL - ADULT  Goal: Achieves stable or improved neurological status  Description: INTERVENTIONS  - Assess for and report changes in neurological status  - Initiate measures to prevent increased intracranial pressure  - Maintain blood pressure and fluid volume within ordered parameters to optimize cerebral perfusion and minimize risk of hemorrhage  - Monitor temperature, glucose, and sodium.  Initiate appropriate interventions as ordered  Outcome: Progressing  Goal: Absence of seizures  Description: INTERVENTIONS  - Monitor for seizure activity  - Administer anti-seizure medications as ordered  - Monitor neurological status  Outcome: Progressing  Goal: Remains free of injury related to seizure activity  Description: INTERVENTIONS:  - Maintain airway, patient safety  and administer oxygen as ordered  - Monitor patient for seizure activity, document and report duration and description of seizure to MD/LIP  - If seizure occurs, turn patient to side and suction secretions as needed  - Reorient patient post seizure  - Seizure pads on all 4 side rails  - Instruct patient/family to notify RN of any seizure activity  - Instruct patient/family to call for assistance with activity based on assessment  Outcome: Progressing  Goal: Achieves maximal functionality and self care  Description: INTERVENTIONS  - Monitor swallowing and airway patency with patient fatigue and changes in neurological status  - Encourage and assist patient to increase activity and self care with guidance from PT/OT  - Encourage visually impaired, hearing impaired and aphasic patients to use assistive/communication devices  Outcome: Progressing    Pt is aox4. RA. Isolation precautions maintained. Plan to dx once medically cleared. Call light within reach.

## 2022-09-09 ENCOUNTER — OFFICE VISIT (OUTPATIENT)
Dept: FAMILY MEDICINE CLINIC | Facility: CLINIC | Age: 72
End: 2022-09-09
Payer: MEDICARE

## 2022-09-09 VITALS
HEIGHT: 67 IN | HEART RATE: 85 BPM | WEIGHT: 221 LBS | SYSTOLIC BLOOD PRESSURE: 130 MMHG | DIASTOLIC BLOOD PRESSURE: 72 MMHG | BODY MASS INDEX: 34.69 KG/M2

## 2022-09-09 DIAGNOSIS — I63.9 CEREBELLAR STROKE (HCC): ICD-10-CM

## 2022-09-09 DIAGNOSIS — M79.671 RIGHT FOOT PAIN: Primary | ICD-10-CM

## 2022-09-09 PROCEDURE — 99213 OFFICE O/P EST LOW 20 MIN: CPT | Performed by: FAMILY MEDICINE

## 2022-09-09 PROCEDURE — 1111F DSCHRG MED/CURRENT MED MERGE: CPT | Performed by: FAMILY MEDICINE

## 2022-09-09 PROCEDURE — 1126F AMNT PAIN NOTED NONE PRSNT: CPT | Performed by: FAMILY MEDICINE

## 2022-09-09 RX ORDER — ACETAMINOPHEN 325 MG/1
2 TABLET ORAL EVERY 6 HOURS PRN
COMMUNITY
Start: 2022-09-01

## 2022-09-09 RX ORDER — LOSARTAN POTASSIUM 25 MG/1
25 TABLET ORAL DAILY
Qty: 30 TABLET | Refills: 0 | COMMUNITY
Start: 2022-09-09 | End: 2022-09-09

## 2022-09-09 NOTE — PROGRESS NOTES
Blood pressure 130/72, pulse 85, height 5' 7\" (1.702 m), weight 221 lb (100.2 kg). Presents today following up for cerebellar stroke sustained prior to this hospitalization a month ago. Still having issues with balance never had any speech issues some generalized weakness but is currently in therapy.     Currently off all blood pressure medications    Objective patient is comfortable no apparent distress cranial nerves II-XII grossly intact cerebellar exam is intact heel-to-shin and finger-to-nose did not do dysdiadochokinesis    Did not do Romberg    Assessment status post cerebellar stroke    Plan continue therapy follow-up in 1 month also referral to podiatry for foot pain and neurology for follow-up

## 2022-09-12 ENCOUNTER — HOSPITAL ENCOUNTER (OUTPATIENT)
Dept: GENERAL RADIOLOGY | Age: 72
Discharge: HOME | End: 2022-09-12
Attending: PODIATRIST
Payer: MEDICARE

## 2022-09-12 ENCOUNTER — OFFICE VISIT (OUTPATIENT)
Dept: PODIATRY CLINIC | Facility: CLINIC | Age: 72
End: 2022-09-12
Payer: MEDICARE

## 2022-09-12 DIAGNOSIS — M10.071 ACUTE IDIOPATHIC GOUT OF RIGHT FOOT: ICD-10-CM

## 2022-09-12 DIAGNOSIS — M10.071 ACUTE IDIOPATHIC GOUT OF RIGHT FOOT: Primary | ICD-10-CM

## 2022-09-12 DIAGNOSIS — M79.671 RIGHT FOOT PAIN: ICD-10-CM

## 2022-09-12 DIAGNOSIS — R26.81 GAIT INSTABILITY: ICD-10-CM

## 2022-09-12 PROCEDURE — 73630 X-RAY EXAM OF FOOT: CPT | Performed by: PODIATRIST

## 2022-09-12 PROCEDURE — 99203 OFFICE O/P NEW LOW 30 MIN: CPT | Performed by: PODIATRIST

## 2022-09-12 PROCEDURE — 1111F DSCHRG MED/CURRENT MED MERGE: CPT | Performed by: PODIATRIST

## 2022-09-12 PROCEDURE — 1125F AMNT PAIN NOTED PAIN PRSNT: CPT | Performed by: PODIATRIST

## 2022-09-12 RX ORDER — METHYLPREDNISOLONE 4 MG/1
TABLET ORAL
Qty: 1 EACH | Refills: 0 | Status: SHIPPED | OUTPATIENT
Start: 2022-09-12

## 2022-10-04 RX ORDER — PANTOPRAZOLE SODIUM 40 MG/1
40 TABLET, DELAYED RELEASE ORAL
Qty: 90 TABLET | Refills: 1 | Status: SHIPPED | OUTPATIENT
Start: 2022-10-04 | End: 2022-10-11

## 2022-10-04 NOTE — TELEPHONE ENCOUNTER
Refill passed per [facility] protocol.    Requested Prescriptions   Pending Prescriptions Disp Refills    pantoprazole 40 MG Oral Tab EC  0     Sig: Take 1 tablet (40 mg total) by mouth every morning before breakfast.        Gastrointestional Medication Protocol Passed - 10/3/2022  1:40 PM        Passed - In person appointment or virtual visit in the past 12 mos or appointment in next 3 mos       Recent Outpatient Visits              3 weeks ago Acute idiopathic gout of right foot    [address] [facility] Alondra Qureshi, Fredrick Wiley, [place], [place]    Office Visit    3 weeks ago Right foot pain    [facility], [address], DO    Office Visit    2 months ago Acute swimmer's ear of left side    [address] Raji Lawrence MD    Office Visit    3 months ago Globus sensation    [address] Raji Lawrence MD    Office Visit    5 months ago Globus pharyngeus    [facility], [address], DO    Office Visit     Future Appointments         Provider Department Appt Notes    Tomorrow Erma Lassiter MD [facility] f/u    In 1 week [facility] 1 month follow up                     Recent Outpatient Visits              3 weeks ago [facility] right foot    [facility] [address] [place], [place]    Office Visit    3 weeks ago Right foot pain    [facility], [address], DO    Office Visit    2 months ago Acute swimmer's ear of left side    [address] Raji Lawrence MD    Office Visit    3 months ago Globus sensation    [address] Raji Lawrence MD    Office Visit    5 months ago Globus pharyngeus    [facility], [address], DO    Office Visit           Future Appointments Provider Department Appt Notes    Tomorrow Williams Eric MD [facility] f/u    In 1 week [facility] 1 month follow up

## 2022-10-11 ENCOUNTER — OFFICE VISIT (OUTPATIENT)
Dept: FAMILY MEDICINE CLINIC | Facility: CLINIC | Age: 72
End: 2022-10-11
Payer: MEDICARE

## 2022-10-11 VITALS
DIASTOLIC BLOOD PRESSURE: 66 MMHG | HEART RATE: 66 BPM | SYSTOLIC BLOOD PRESSURE: 136 MMHG | WEIGHT: 229 LBS | HEIGHT: 67 IN | BODY MASS INDEX: 35.94 KG/M2

## 2022-10-11 DIAGNOSIS — C18.7 MALIGNANT NEOPLASM OF SIGMOID COLON (HCC): ICD-10-CM

## 2022-10-11 DIAGNOSIS — I25.118 CORONARY ARTERY DISEASE INVOLVING NATIVE CORONARY ARTERY OF NATIVE HEART WITH OTHER FORM OF ANGINA PECTORIS (HCC): ICD-10-CM

## 2022-10-11 DIAGNOSIS — I10 ESSENTIAL HYPERTENSION: Primary | ICD-10-CM

## 2022-10-11 PROBLEM — E66.01 MORBID (SEVERE) OBESITY DUE TO EXCESS CALORIES (HCC): Status: ACTIVE | Noted: 2022-10-11

## 2022-10-11 PROCEDURE — 99213 OFFICE O/P EST LOW 20 MIN: CPT | Performed by: FAMILY MEDICINE

## 2022-10-11 PROCEDURE — 1126F AMNT PAIN NOTED NONE PRSNT: CPT | Performed by: FAMILY MEDICINE

## 2022-10-11 RX ORDER — HYDROCHLOROTHIAZIDE 12.5 MG/1
TABLET ORAL
COMMUNITY
Start: 2022-10-09 | End: 2022-10-11

## 2022-10-11 NOTE — PROGRESS NOTES
Blood pressure 136/66, pulse 66, height 5' 7\" (1.702 m), weight 229 lb (103.9 kg). Patient presents today with history of foot pain now resolved. He reports that he continues to have balance issue although they have improved significantly. Strength is improved overall.     Objective comfortable no apparent distress    Assessment hypertension controlled status post stroke    Plan follow-up in 3 months

## 2022-11-04 RX ORDER — TICAGRELOR 90 MG/1
TABLET ORAL
Qty: 90 TABLET | Refills: 0 | Status: SHIPPED | OUTPATIENT
Start: 2022-11-04

## 2022-11-15 RX ORDER — EZETIMIBE 10 MG/1
10 TABLET ORAL DAILY
Qty: 90 TABLET | Refills: 1 | Status: SHIPPED | OUTPATIENT
Start: 2022-11-15

## 2022-11-15 NOTE — TELEPHONE ENCOUNTER
Refill passed per [facility] protocol. Requested Prescriptions   Pending Prescriptions Disp Refills    ezetimibe 10 MG Oral Tab 90 tablet 3     Sig: Take 1 tablet (10 mg total) by mouth daily. Cholesterol Medication Protocol Passed - 11/15/2022  8:17 AM        Passed - ALT in past 12 months        Passed - LDL in past 12 months        Passed - Last ALT < 80     Lab Results   Component Value Date    ALT 67 (H) 08/14/2022             Passed - Last LDL < 130     Lab Results   Component Value Date    LDL 51 08/10/2022             Passed - In person appointment or virtual visit in the past 12 mos or appointment in next 3 mos     Recent Outpatient Visits              1 month ago Essential hypertension    [address], DO    Office Visit    2 months ago Acute idiopathic gout of right foot    [facility], [place] [address], [address] [place] [address], [place], [place]    Office Visit    2 months ago Right foot pain    [address] [employer], DO    Office Visit    4 months ago Acute swimmer's ear of left side    Kyung Mistry MD    Office Visit    4 months ago Globus sensation    Kyung Mistry MD    Office Visit          Future Appointments         Provider Department Appt Notes    Tomorrow Michael Valera MD [facility], [address] [place] [address] Check up. Wheezing and out of breath.  Has gained an additional 10 pounds                  Recent Outpatient Visits              1 month ago Essential hypertension    [place] [address], [address] [place] [address] Drive, DO    Office Visit    2 months ago Acute idiopathic gout of right foot    [facility], [address], Fredrick Wiley, [place], [place]    Office Visit    2 months ago Right foot pain    [address], DO    Office Visit    4 months ago Acute swimmer's ear of left side    [address] Kyung Lawrence MD    Office Visit    4 months ago Globus sensation    [address] Kyung Lawrence MD    Office Visit          Future Appointments         Provider Department Appt Notes    Tomorrow Michael Valera MD [facility], [address] [place] [address] Check up. Wheezing and out of breath.  Has gained an additional 10 pounds

## 2022-11-16 ENCOUNTER — OFFICE VISIT (OUTPATIENT)
Dept: PULMONOLOGY | Facility: CLINIC | Age: 72
End: 2022-11-16
Payer: MEDICARE

## 2022-11-16 VITALS
RESPIRATION RATE: 14 BRPM | OXYGEN SATURATION: 97 % | SYSTOLIC BLOOD PRESSURE: 146 MMHG | WEIGHT: 238 LBS | HEIGHT: 66 IN | HEART RATE: 90 BPM | DIASTOLIC BLOOD PRESSURE: 84 MMHG | BODY MASS INDEX: 38.25 KG/M2

## 2022-11-16 DIAGNOSIS — G47.33 OSA (OBSTRUCTIVE SLEEP APNEA): ICD-10-CM

## 2022-11-16 DIAGNOSIS — R91.1 LUNG NODULE: ICD-10-CM

## 2022-11-16 DIAGNOSIS — J44.9 CHRONIC OBSTRUCTIVE PULMONARY DISEASE, UNSPECIFIED COPD TYPE (HCC): Primary | ICD-10-CM

## 2022-11-16 PROCEDURE — 99214 OFFICE O/P EST MOD 30 MIN: CPT | Performed by: INTERNAL MEDICINE

## 2022-11-16 PROCEDURE — 1126F AMNT PAIN NOTED NONE PRSNT: CPT | Performed by: INTERNAL MEDICINE

## 2022-11-16 RX ORDER — BUDESONIDE AND FORMOTEROL FUMARATE DIHYDRATE 160; 4.5 UG/1; UG/1
2 AEROSOL RESPIRATORY (INHALATION) 2 TIMES DAILY
Qty: 10.2 G | Refills: 3 | Status: SHIPPED | OUTPATIENT
Start: 2022-11-16

## 2022-11-16 RX ORDER — IPRATROPIUM BROMIDE AND ALBUTEROL SULFATE 2.5; .5 MG/3ML; MG/3ML
3 SOLUTION RESPIRATORY (INHALATION) 2 TIMES DAILY
Qty: 100 EACH | Refills: 1 | Status: SHIPPED | OUTPATIENT
Start: 2022-11-16

## 2022-11-16 NOTE — PROGRESS NOTES
Face sheet, DME order and office visit note faxed to [facility]. Fax confirmation received.

## 2022-11-28 ENCOUNTER — TELEPHONE (OUTPATIENT)
Dept: PULMONOLOGY | Facility: CLINIC | Age: 72
End: 2022-11-28

## 2022-11-28 RX ORDER — IPRATROPIUM BROMIDE AND ALBUTEROL SULFATE 2.5; .5 MG/3ML; MG/3ML
3 SOLUTION RESPIRATORY (INHALATION) 2 TIMES DAILY
Qty: 100 EACH | Refills: 1 | Status: SHIPPED | OUTPATIENT
Start: 2022-11-28

## 2022-12-21 ENCOUNTER — OFFICE VISIT (OUTPATIENT)
Dept: FAMILY MEDICINE CLINIC | Facility: CLINIC | Age: 72
End: 2022-12-21
Payer: MEDICARE

## 2022-12-21 VITALS
HEART RATE: 70 BPM | WEIGHT: 238 LBS | HEIGHT: 66 IN | OXYGEN SATURATION: 97 % | BODY MASS INDEX: 38.25 KG/M2 | SYSTOLIC BLOOD PRESSURE: 162 MMHG | DIASTOLIC BLOOD PRESSURE: 91 MMHG

## 2022-12-21 DIAGNOSIS — I10 ESSENTIAL HYPERTENSION WITH GOAL BLOOD PRESSURE LESS THAN 140/90: ICD-10-CM

## 2022-12-21 DIAGNOSIS — I10 HYPERTENSION, UNSPECIFIED TYPE: Primary | ICD-10-CM

## 2022-12-21 PROCEDURE — 1126F AMNT PAIN NOTED NONE PRSNT: CPT | Performed by: FAMILY MEDICINE

## 2022-12-21 PROCEDURE — 99214 OFFICE O/P EST MOD 30 MIN: CPT | Performed by: FAMILY MEDICINE

## 2022-12-21 RX ORDER — LOSARTAN POTASSIUM 50 MG/1
TABLET ORAL
COMMUNITY
Start: 2022-11-01 | End: 2022-12-21

## 2022-12-21 RX ORDER — BUPROPION HYDROCHLORIDE 150 MG/1
150 TABLET, EXTENDED RELEASE ORAL DAILY
Qty: 90 TABLET | Refills: 1 | Status: SHIPPED | OUTPATIENT
Start: 2022-12-21

## 2022-12-21 RX ORDER — SERTRALINE HYDROCHLORIDE 100 MG/1
100 TABLET, FILM COATED ORAL NIGHTLY
COMMUNITY
Start: 2022-11-01

## 2022-12-21 RX ORDER — LOSARTAN POTASSIUM 50 MG/1
100 TABLET ORAL DAILY
Qty: 90 TABLET | Refills: 0 | Status: SHIPPED | OUTPATIENT
Start: 2022-12-21

## 2022-12-21 NOTE — PROGRESS NOTES
Blood pressure (!) 162/91, pulse 70, height 5' 6\" (1.676 m), weight 238 lb (108 kg), SpO2 97 %. Presents today status post stroke. Recovering well. Walking better without a cane. Some interest in driving. Also reports he does seem slightly more irritable since the stroke. He denies anxiety. He reports that he feels that his memory is good. He reports he was told by at [facility] therapy that he would be allowed to drive.     Objective      CN II-XII GROSSLY INTACT MUSCLE STRENGTH +5/5 UPPER AND LOWER EXTREMITIES B/L DTR'S UPPER AND LOWER +2/4 UPPER AND LOWER EXTREMITIES B/L NEG PRONATOR DRIFT NEG BABINSKI NO CEREBELLAR SIGNS VISUAL JOHNSON INTACT    Able to name 20/14 animals in 1 minute    Able to draw clock showing the correct time    Mini-Mental status exam score 27/30 short-term recall in the date    Appropriate mood and affect    Assessment #1 some dysphoria status post CVA #2 status post CVA    Hypertension uncontrolled    Plan #1 add Wellbutrin #2 follow-up with therapy discussed possibility of driving versus not driving versus evaluation for driving #3 increase losartan    Follow-up in 1 month Medicare annual physical

## 2023-01-10 ENCOUNTER — PATIENT MESSAGE (OUTPATIENT)
Dept: FAMILY MEDICINE CLINIC | Facility: CLINIC | Age: 73
End: 2023-01-10

## 2023-01-11 NOTE — TELEPHONE ENCOUNTER
Queried chart and immunization record updated.       Monty Bo RN 1/11/2023 12:31 PM CST        ----- Message -----  From: Jeff Hong  Sent: 1/10/2023   1:08 PM CST  To: Em Rn Triage  Subject: Response re: flu and Covid shots                 Aurea Griffin had both vaccines shots

## 2023-01-18 RX ORDER — SERTRALINE HYDROCHLORIDE 100 MG/1
TABLET, FILM COATED ORAL
Qty: 135 TABLET | Refills: 0 | OUTPATIENT
Start: 2023-01-18

## 2023-01-24 ENCOUNTER — OFFICE VISIT (OUTPATIENT)
Dept: FAMILY MEDICINE CLINIC | Facility: CLINIC | Age: 73
End: 2023-01-24

## 2023-01-24 ENCOUNTER — EKG ENCOUNTER (OUTPATIENT)
Dept: LAB | Age: 73
End: 2023-01-24
Attending: FAMILY MEDICINE
Payer: MEDICARE

## 2023-01-24 ENCOUNTER — LAB ENCOUNTER (OUTPATIENT)
Dept: LAB | Age: 73
End: 2023-01-24
Attending: FAMILY MEDICINE
Payer: MEDICARE

## 2023-01-24 VITALS
HEART RATE: 65 BPM | BODY MASS INDEX: 38.73 KG/M2 | WEIGHT: 241 LBS | SYSTOLIC BLOOD PRESSURE: 172 MMHG | HEIGHT: 66 IN | DIASTOLIC BLOOD PRESSURE: 97 MMHG

## 2023-01-24 DIAGNOSIS — Z86.59 HISTORY OF POSTTRAUMATIC STRESS DISORDER (PTSD): ICD-10-CM

## 2023-01-24 DIAGNOSIS — I10 HYPERTENSION, UNSPECIFIED TYPE: ICD-10-CM

## 2023-01-24 DIAGNOSIS — I25.10 CORONARY ARTERY DISEASE INVOLVING NATIVE CORONARY ARTERY OF NATIVE HEART WITHOUT ANGINA PECTORIS: ICD-10-CM

## 2023-01-24 DIAGNOSIS — Z00.00 MEDICARE ANNUAL WELLNESS VISIT, SUBSEQUENT: Primary | ICD-10-CM

## 2023-01-24 DIAGNOSIS — E66.01 MORBID (SEVERE) OBESITY DUE TO EXCESS CALORIES (HCC): ICD-10-CM

## 2023-01-24 DIAGNOSIS — I25.118 CORONARY ARTERY DISEASE INVOLVING NATIVE CORONARY ARTERY OF NATIVE HEART WITH OTHER FORM OF ANGINA PECTORIS (HCC): ICD-10-CM

## 2023-01-24 DIAGNOSIS — J44.9 CHRONIC OBSTRUCTIVE PULMONARY DISEASE, UNSPECIFIED COPD TYPE (HCC): ICD-10-CM

## 2023-01-24 DIAGNOSIS — E78.2 MIXED HYPERLIPIDEMIA: ICD-10-CM

## 2023-01-24 DIAGNOSIS — I10 ESSENTIAL HYPERTENSION: ICD-10-CM

## 2023-01-24 DIAGNOSIS — I63.9 CEREBELLAR STROKE (HCC): ICD-10-CM

## 2023-01-24 DIAGNOSIS — Z00.00 ENCOUNTER FOR ANNUAL HEALTH EXAMINATION: ICD-10-CM

## 2023-01-24 DIAGNOSIS — Z00.00 MEDICARE ANNUAL WELLNESS VISIT, SUBSEQUENT: ICD-10-CM

## 2023-01-24 PROBLEM — C18.7 MALIGNANT NEOPLASM OF SIGMOID COLON (HCC): Status: RESOLVED | Noted: 2019-12-03 | Resolved: 2023-01-24

## 2023-01-24 LAB
ALBUMIN SERPL-MCNC: 4 G/DL (ref 3.4–5)
ALBUMIN/GLOB SERPL: 1.3 {RATIO} (ref 1–2)
ALP LIVER SERPL-CCNC: 97 U/L
ALT SERPL-CCNC: 36 U/L
ANION GAP SERPL CALC-SCNC: 9 MMOL/L (ref 0–18)
AST SERPL-CCNC: 19 U/L (ref 15–37)
BILIRUB SERPL-MCNC: 0.7 MG/DL (ref 0.1–2)
BUN BLD-MCNC: 15 MG/DL (ref 7–18)
BUN/CREAT SERPL: 13.4 (ref 10–20)
CALCIUM BLD-MCNC: 10.2 MG/DL (ref 8.5–10.1)
CHLORIDE SERPL-SCNC: 104 MMOL/L (ref 98–112)
CHOLEST SERPL-MCNC: 162 MG/DL (ref ?–200)
CO2 SERPL-SCNC: 28 MMOL/L (ref 21–32)
CREAT BLD-MCNC: 1.12 MG/DL
FASTING PATIENT LIPID ANSWER: YES
FASTING STATUS PATIENT QL REPORTED: YES
GFR SERPLBLD BASED ON 1.73 SQ M-ARVRAT: 69 ML/MIN/1.73M2 (ref 60–?)
GLOBULIN PLAS-MCNC: 3.2 G/DL (ref 2.8–4.4)
GLUCOSE BLD-MCNC: 109 MG/DL (ref 70–99)
HDLC SERPL-MCNC: 66 MG/DL (ref 40–59)
LDLC SERPL CALC-MCNC: 67 MG/DL (ref ?–100)
NONHDLC SERPL-MCNC: 96 MG/DL (ref ?–130)
OSMOLALITY SERPL CALC.SUM OF ELEC: 293 MOSM/KG (ref 275–295)
POTASSIUM SERPL-SCNC: 4.6 MMOL/L (ref 3.5–5.1)
PROT SERPL-MCNC: 7.2 G/DL (ref 6.4–8.2)
SODIUM SERPL-SCNC: 141 MMOL/L (ref 136–145)
TRIGL SERPL-MCNC: 173 MG/DL (ref 30–149)
TSI SER-ACNC: 1.92 MIU/ML (ref 0.36–3.74)
VLDLC SERPL CALC-MCNC: 26 MG/DL (ref 0–30)

## 2023-01-24 PROCEDURE — 80061 LIPID PANEL: CPT

## 2023-01-24 PROCEDURE — G0439 PPPS, SUBSEQ VISIT: HCPCS | Performed by: FAMILY MEDICINE

## 2023-01-24 PROCEDURE — 80053 COMPREHEN METABOLIC PANEL: CPT

## 2023-01-24 PROCEDURE — 93010 ELECTROCARDIOGRAM REPORT: CPT | Performed by: INTERNAL MEDICINE

## 2023-01-24 PROCEDURE — 84443 ASSAY THYROID STIM HORMONE: CPT

## 2023-01-24 PROCEDURE — 36415 COLL VENOUS BLD VENIPUNCTURE: CPT

## 2023-01-24 PROCEDURE — 99213 OFFICE O/P EST LOW 20 MIN: CPT | Performed by: FAMILY MEDICINE

## 2023-01-24 PROCEDURE — 93005 ELECTROCARDIOGRAM TRACING: CPT

## 2023-01-24 PROCEDURE — 1126F AMNT PAIN NOTED NONE PRSNT: CPT | Performed by: FAMILY MEDICINE

## 2023-01-24 RX ORDER — CARVEDILOL 3.12 MG/1
3.12 TABLET ORAL 2 TIMES DAILY WITH MEALS
Qty: 60 TABLET | Refills: 1 | Status: SHIPPED | OUTPATIENT
Start: 2023-01-24

## 2023-01-25 LAB
ATRIAL RATE: 61 BPM
P AXIS: 3 DEGREES
P-R INTERVAL: 190 MS
Q-T INTERVAL: 444 MS
QRS DURATION: 88 MS
QTC CALCULATION (BEZET): 446 MS
R AXIS: -29 DEGREES
T AXIS: 23 DEGREES
VENTRICULAR RATE: 61 BPM

## 2023-02-17 ENCOUNTER — OFFICE VISIT (OUTPATIENT)
Dept: FAMILY MEDICINE CLINIC | Facility: CLINIC | Age: 73
End: 2023-02-17

## 2023-02-17 VITALS
HEIGHT: 66 IN | BODY MASS INDEX: 38.73 KG/M2 | SYSTOLIC BLOOD PRESSURE: 140 MMHG | HEART RATE: 69 BPM | DIASTOLIC BLOOD PRESSURE: 85 MMHG | WEIGHT: 241 LBS

## 2023-02-17 DIAGNOSIS — L98.9 FACIAL LESION: ICD-10-CM

## 2023-02-17 DIAGNOSIS — E66.9 OBESITY (BMI 30-39.9): Primary | ICD-10-CM

## 2023-02-17 PROCEDURE — 1126F AMNT PAIN NOTED NONE PRSNT: CPT | Performed by: FAMILY MEDICINE

## 2023-02-17 PROCEDURE — 99213 OFFICE O/P EST LOW 20 MIN: CPT | Performed by: FAMILY MEDICINE

## 2023-02-17 RX ORDER — SERTRALINE HYDROCHLORIDE 100 MG/1
100 TABLET, FILM COATED ORAL DAILY
Qty: 90 TABLET | Refills: 0 | COMMUNITY
Start: 2023-02-17

## 2023-02-17 RX ORDER — CARVEDILOL 6.25 MG/1
6.25 TABLET ORAL 2 TIMES DAILY WITH MEALS
Qty: 180 TABLET | Refills: 1 | Status: SHIPPED | OUTPATIENT
Start: 2023-02-17

## 2023-02-17 NOTE — PROGRESS NOTES
Blood pressure 140/85, pulse 69, height 5' 6\" (1.676 m), weight 241 lb (109.3 kg). Following up for hypertension. Also with suspicious lesion left side of his face.     Objective comfortable no apparent distress    Assessment hypertension uncontrolled    Plan weight loss discussed patient will follow-up with bariatrics    Increase carvedilol    Referral to dermatology for facial lesion abnormal    Follow-up in 1 month

## 2023-02-22 NOTE — TELEPHONE ENCOUNTER
LOV: 11/16/2022  Last refill: 11/28/2022    Dr. Nel Haney review and sign pended prescription if agreeable.

## 2023-02-24 RX ORDER — IPRATROPIUM BROMIDE AND ALBUTEROL SULFATE 2.5; .5 MG/3ML; MG/3ML
SOLUTION RESPIRATORY (INHALATION)
Qty: 540 ML | Refills: 0 | Status: SHIPPED | OUTPATIENT
Start: 2023-02-24

## 2023-03-03 ENCOUNTER — TELEPHONE (OUTPATIENT)
Dept: PULMONOLOGY | Facility: CLINIC | Age: 73
End: 2023-03-03

## 2023-03-03 NOTE — TELEPHONE ENCOUNTER
Received fax from [facility] requesting signature for CMN for Albuterol. Placed on Dr. Marco Mercedes folder for review.

## 2023-03-09 NOTE — TELEPHONE ENCOUNTER
Inhalation DWO from [place] for ipratropium/albuterol 0.5/3MG nebulized solution quantity 540 with 1 refill completed with diagnosis of COPD and signed by Dr. Milena Norton. Faxed completed form to [address] [place] [address] at fax# [phone], alternative fax# [phone] as well as [facility] [place] at fax# [phone]. All fax confirmations were received, original sent out for scanning.

## 2023-03-15 ENCOUNTER — OFFICE VISIT (OUTPATIENT)
Dept: PULMONOLOGY | Facility: CLINIC | Age: 73
End: 2023-03-15

## 2023-03-15 VITALS
RESPIRATION RATE: 16 BRPM | HEIGHT: 66 IN | DIASTOLIC BLOOD PRESSURE: 63 MMHG | SYSTOLIC BLOOD PRESSURE: 175 MMHG | OXYGEN SATURATION: 97 % | BODY MASS INDEX: 38.41 KG/M2 | WEIGHT: 239 LBS | HEART RATE: 62 BPM

## 2023-03-15 DIAGNOSIS — J44.9 CHRONIC OBSTRUCTIVE PULMONARY DISEASE, UNSPECIFIED COPD TYPE (HCC): Primary | ICD-10-CM

## 2023-03-15 DIAGNOSIS — G47.33 OSA (OBSTRUCTIVE SLEEP APNEA): ICD-10-CM

## 2023-03-15 PROCEDURE — 99214 OFFICE O/P EST MOD 30 MIN: CPT | Performed by: INTERNAL MEDICINE

## 2023-03-15 PROCEDURE — 1126F AMNT PAIN NOTED NONE PRSNT: CPT | Performed by: INTERNAL MEDICINE

## 2023-03-15 RX ORDER — FLUTICASONE FUROATE, UMECLIDINIUM BROMIDE AND VILANTEROL TRIFENATATE 200; 62.5; 25 UG/1; UG/1; UG/1
1 POWDER RESPIRATORY (INHALATION) DAILY
Qty: 1 EACH | Refills: 3 | Status: SHIPPED | OUTPATIENT
Start: 2023-03-15

## 2023-03-15 RX ORDER — ALBUTEROL SULFATE 2.5 MG/3ML
2.5 SOLUTION RESPIRATORY (INHALATION) 2 TIMES DAILY
Qty: 100 EACH | Refills: 0 | Status: SHIPPED | OUTPATIENT
Start: 2023-03-15 | End: 2023-03-16 | Stop reason: CLARIF

## 2023-03-16 ENCOUNTER — TELEPHONE (OUTPATIENT)
Dept: PULMONOLOGY | Facility: CLINIC | Age: 73
End: 2023-03-16

## 2023-03-16 RX ORDER — ALBUTEROL SULFATE 2.5 MG/3ML
2.5 SOLUTION RESPIRATORY (INHALATION) 2 TIMES DAILY
Qty: 100 ML | Refills: 0 | Status: SHIPPED | OUTPATIENT
Start: 2023-03-16

## 2023-03-17 ENCOUNTER — OFFICE VISIT (OUTPATIENT)
Dept: FAMILY MEDICINE CLINIC | Facility: CLINIC | Age: 73
End: 2023-03-17

## 2023-03-17 VITALS
HEIGHT: 66 IN | HEART RATE: 74 BPM | DIASTOLIC BLOOD PRESSURE: 81 MMHG | SYSTOLIC BLOOD PRESSURE: 134 MMHG | BODY MASS INDEX: 38.41 KG/M2 | WEIGHT: 239 LBS

## 2023-03-17 DIAGNOSIS — L57.0 ACTINIC KERATOSES: Primary | ICD-10-CM

## 2023-03-17 PROCEDURE — 1126F AMNT PAIN NOTED NONE PRSNT: CPT | Performed by: FAMILY MEDICINE

## 2023-03-17 PROCEDURE — 99213 OFFICE O/P EST LOW 20 MIN: CPT | Performed by: FAMILY MEDICINE

## 2023-03-17 RX ORDER — FLUOROURACIL 50 MG/G
1 CREAM TOPICAL 2 TIMES DAILY
Qty: 1 EACH | Refills: 1 | Status: SHIPPED | OUTPATIENT
Start: 2023-03-17

## 2023-03-17 NOTE — PROGRESS NOTES
Blood pressure 134/81, pulse 74, height 5' 6\" (1.676 m), weight 239 lb (108.4 kg). Concerned about some new facial lesions red up around the cheekbones and in the ear.     Objective erythematous lesions noted around the maxilla on the left side also in the ear    Assessment actinic keratosis history of obesity    Plan Efudex cream follow-up if too expensive also    Bariatrics information printed out given to patient

## 2023-03-27 ENCOUNTER — LAB ENCOUNTER (OUTPATIENT)
Dept: LAB | Age: 73
End: 2023-03-27
Attending: INTERNAL MEDICINE
Payer: MEDICARE

## 2023-03-27 DIAGNOSIS — J44.9 CHRONIC OBSTRUCTIVE PULMONARY DISEASE, UNSPECIFIED COPD TYPE (HCC): ICD-10-CM

## 2023-03-28 LAB — SARS-COV-2 RNA RESP QL NAA+PROBE: NOT DETECTED

## 2023-03-30 ENCOUNTER — HOSPITAL ENCOUNTER (OUTPATIENT)
Dept: RESPIRATORY THERAPY | Facility: HOSPITAL | Age: 73
Discharge: HOME | End: 2023-03-30
Attending: INTERNAL MEDICINE
Payer: MEDICARE

## 2023-03-30 ENCOUNTER — PATIENT MESSAGE (OUTPATIENT)
Dept: PULMONOLOGY | Facility: CLINIC | Age: 73
End: 2023-03-30

## 2023-03-30 DIAGNOSIS — J44.9 CHRONIC OBSTRUCTIVE PULMONARY DISEASE, UNSPECIFIED COPD TYPE (HCC): ICD-10-CM

## 2023-03-30 PROCEDURE — 94726 PLETHYSMOGRAPHY LUNG VOLUMES: CPT | Performed by: INTERNAL MEDICINE

## 2023-03-30 PROCEDURE — 94060 EVALUATION OF WHEEZING: CPT | Performed by: INTERNAL MEDICINE

## 2023-03-30 PROCEDURE — 94729 DIFFUSING CAPACITY: CPT | Performed by: INTERNAL MEDICINE

## 2023-03-30 NOTE — PROCEDURES
[facility]     Pulmonary Function Test     Trevor Mcgraw Patient Status:  Outpatient    1950 MRN [id number]   Date of Exam 3/30/23 PCP Emelda Phoenix. DO Shyam           Spirometry   FEV1: 1.76 66%  FVC: 2.72 77%  FEV1/FVC: 0.65    Lung Volume   T.17 83%  RV : 2.30 98%    Diffusion Capacity   DLCO: 19.8 85%    Flow Volume Loop       Impression   Moderate obstructive defect seen with significant postbronchodilator response observed. Normal lung volumes. Normal diffusion capacity.     Susanna Ferro DO  Pulmonary [facility]

## 2023-04-07 ENCOUNTER — TELEPHONE (OUTPATIENT)
Dept: PULMONOLOGY | Facility: CLINIC | Age: 73
End: 2023-04-07

## 2023-04-07 NOTE — TELEPHONE ENCOUNTER
Received fax from [place], attached is a DWo for Albuterol. Placed in Dr Nia Haines folder for signature.

## 2023-05-22 RX ORDER — FINASTERIDE 5 MG/1
5 TABLET, FILM COATED ORAL DAILY
Qty: 90 TABLET | Refills: 3 | Status: SHIPPED | OUTPATIENT
Start: 2023-05-22

## 2023-05-22 NOTE — TELEPHONE ENCOUNTER
Refill passed per [facility] protocol. Requested Prescriptions   Pending Prescriptions Disp Refills    FINASTERIDE 5 MG Oral Tab [Pharmacy Med Name: FINASTERIDE 5MG TABLETS] 90 tablet 3     Sig: TAKE 1 TABLET(5 MG) BY MOUTH DAILY       Genitourinary Medications Passed - 5/21/2023  2:42 PM        Passed - Patient does not have pulmonary hypertension on problem list        Passed - In person appointment or virtual visit in the past 12 mos or appointment in next 3 mos     Recent Outpatient Visits              2 months ago Actinic keratoses    [facility], [address], [place] Ayana Gee, DO    Office Visit    2 months ago Chronic obstructive pulmonary disease, unspecified COPD type [facility])    Laura Smith MD    Office Visit    3 months ago Obesity (BMI 30-39. 9)    [facility], [address], [place] Ay[place] Gee, DO    Office Visit    3 months ago Benito Chaudahri annual wellness visit, [facility] [address], [place] Ayana Gee, DO    Office Visit    5 months ago Hypertension, unspecified type    [facility], [address], [place] Howe, DO    Office Visit          Future Appointments         Provider Department Appt Notes    In 3 months Melissa Sadler MD [address] [facility],[address], [address] [facility],[address], [address]                   Recent Outpatient Visits              2 months ago Actinic keratoses    [facility], [address], [place] Ay[place] Gee, DO    Office Visit    2 months ago Chronic obstructive pulmonary disease, unspecified COPD type [facility])    Laura Smith MD    Office Visit    3 months ago Obesity (BMI 30-39. 9)    [facility], [address], [place] Ayana Gee, DO    Office Visit    3 months ago Medicare annual wellness visit, subsequent    [facility], [address], [place] Allen,     Office Visit    5 months ago Hypertension, unspecified type    [facility], [address], [place]ush Howe,     Office Visit           Future Appointments         Provider Department Appt Notes    In 3 months Mariaa Cohn MD [address], [address] [facility],[address], Critical access hospital

## 2023-06-19 RX ORDER — ATORVASTATIN CALCIUM 80 MG/1
TABLET, FILM COATED ORAL
Qty: 90 TABLET | Refills: 1 | OUTPATIENT
Start: 2023-06-19

## 2023-06-22 RX ORDER — FLUTICASONE FUROATE, UMECLIDINIUM BROMIDE AND VILANTEROL TRIFENATATE 200; 62.5; 25 UG/1; UG/1; UG/1
1 POWDER RESPIRATORY (INHALATION) DAILY
Qty: 60 EACH | Refills: 3 | Status: SHIPPED | OUTPATIENT
Start: 2023-06-22

## 2023-06-22 NOTE — TELEPHONE ENCOUNTER
LOV: 3/15/2023  Last refill: 3/15/2023    Dr. Leti Torre review and sign pended prescription if agreeable.

## 2023-08-25 RX ORDER — CARVEDILOL 6.25 MG/1
6.25 TABLET ORAL 2 TIMES DAILY WITH MEALS
Qty: 180 TABLET | Refills: 3 | OUTPATIENT
Start: 2023-08-25

## 2023-08-29 RX ORDER — CARVEDILOL 6.25 MG/1
6.25 TABLET ORAL 2 TIMES DAILY WITH MEALS
Qty: 180 TABLET | Refills: 0 | Status: SHIPPED | OUTPATIENT
Start: 2023-08-29

## 2023-09-15 RX ORDER — SERTRALINE HYDROCHLORIDE 100 MG/1
150 TABLET, FILM COATED ORAL DAILY
Qty: 135 TABLET | Refills: 3 | OUTPATIENT
Start: 2023-09-15

## 2023-09-20 ENCOUNTER — PATIENT MESSAGE (OUTPATIENT)
Dept: FAMILY MEDICINE CLINIC | Facility: CLINIC | Age: 73
End: 2023-09-20

## 2023-09-21 RX ORDER — ATORVASTATIN CALCIUM 80 MG/1
80 TABLET, FILM COATED ORAL NIGHTLY
Qty: 90 TABLET | Refills: 0 | Status: SHIPPED | OUTPATIENT
Start: 2023-09-21 | End: 2023-12-20

## 2023-09-21 NOTE — TELEPHONE ENCOUNTER
Refill passed. But this medication is marked discontinued. 02/17/23 [address] Guillaume, Heath GÓMEZ, DO    02/17/23 1324 Discontinue Holly Garber, DO      Should patient still be taking? Requested Prescriptions   Pending Prescriptions Disp Refills    atorvastatin 80 MG Oral Tab 90 tablet 3     Sig: Take 1 tablet (80 mg total) by mouth nightly. Cholesterol Medication Protocol Passed - 9/21/2023  2:48 PM        Passed - ALT in past 12 months        Passed - LDL in past 12 months        Passed - Last ALT < 80     Lab Results   Component Value Date    ALT 36 01/24/2023             Passed - Last LDL < 130     Lab Results   Component Value Date    LDL 67 01/24/2023             Passed - In person appointment or virtual visit in the past 12 mos or appointment in next 3 mos     Recent Outpatient Visits              6 months ago Actinic keratoses    [facility], [address], [place], Marlin Paget, DO    Office Visit    6 months ago Chronic obstructive pulmonary disease, unspecified COPD type [facility])    Pippa You MD    Office Visit    7 months ago Obesity (BMI 30-39. 9)    [facility], [address], [place], Marlin Paget, DO    Office Visit    8 months ago [employer] annual wellness visit, subsequent    [facility], [facility] [address], [place], Marlin Paget, DO    Office Visit    9 months ago Hypertension, unspecified type    [facility], [address], [place], Marlin Paget, DO    Office Visit                           Recent Outpatient Visits              6 months ago Actinic keratoses    [facility], [address], [place], Marlin Paget, DO    Office Visit    6 months ago Chronic obstructive pulmonary disease, unspecified COPD type [facility])    Pippa You MD    Office Visit 7 months ago Obesity (BMI 30-39. 9)    [facility], [facility], [place] Jozef Dobson, DO    Office Visit    8 months ago [employer] annual wellness visit, subsequent    [facility], [address], [place], DO    Office Visit    9 months ago Hypertension, unspecified type    [facility], [address], [address] [place] [address], Jozef Plummer, DO    Office Visit

## 2023-10-24 ENCOUNTER — TELEPHONE (OUTPATIENT)
Dept: FAMILY MEDICINE CLINIC | Facility: CLINIC | Age: 73
End: 2023-10-24

## 2023-11-28 ENCOUNTER — TELEPHONE (OUTPATIENT)
Dept: FAMILY MEDICINE CLINIC | Facility: CLINIC | Age: 73
End: 2023-11-28

## 2023-11-30 RX ORDER — CARVEDILOL 6.25 MG/1
6.25 TABLET ORAL 2 TIMES DAILY WITH MEALS
Qty: 180 TABLET | Refills: 0 | Status: SHIPPED | OUTPATIENT
Start: 2023-11-30

## 2023-11-30 NOTE — TELEPHONE ENCOUNTER
Please review; protocol failed. No active /future labs noted   Message sent for patient to make an appointment. Requested Prescriptions   Pending Prescriptions Disp Refills    CARVEDILOL 6.25 MG Oral Tab [Pharmacy Med Name: CARVEDILOL 6.25MG TABLETS] 180 tablet 0     Sig: TAKE 1 TABLET(6.25 MG) BY MOUTH TWICE DAILY WITH MEALS       Hypertensive Medications Protocol Failed - 11/28/2023  9:35 AM        Failed - CMP or BMP in past 6 months     No results found for this or any previous visit (from the past 4392 hour(s)). Failed - In person appointment or virtual visit in the past 6 months     Recent Outpatient Visits              8 months ago Actinic keratoses    [facility], [address], [place], DO    Office Visit    8 months ago Chronic obstructive pulmonary disease, unspecified COPD type [facility])    Chilo Fox MD    Office Visit    9 months ago Obesity (BMI 30-39. 9)    [facility], [address], [place], DO    Office Visit    10 months ago Benito Chaudhari annual wellness visit, subsequent    [facility] [address], [place], DO    Office Visit    11 months ago Hypertension, unspecified type    [facility], [address], [place], DO    Office Visit                      Passed - In person appointment in the past 12 or next 3 months     Recent Outpatient Visits              8 months ago Actinic keratoses    [facility], [address], [place], DO    Office Visit    8 months ago Chronic obstructive pulmonary disease, unspecified COPD type [facility])    Chilo Fox MD    Office Visit    9 months ago Obesity (BMI 30-39. 9)    [address] [facility], [address] [facility], Pippa KarlE[place], [place]    Office Visit    10 months ago Benito Chaudhari annual wellness visit, subsequent    [facility], [facility], [place] Renaldo Gee, DO    Office Visit    11 months ago Hypertension, unspecified type    [address], [address], [place] Renaldo Gee, DO    Office Visit                      Passed - Last BP reading less than 140/90     BP Readings from Last 1 Encounters:   03/17/23 134/81               Passed - EGFRCR or GFRNAA > 50     GFR Evaluation  EGFRCR: 69 , resulted on 1/24/2023             Recent Outpatient Visits              8 months ago Actinic keratoses    [facility], [facility], [place], Renaldo Walters, DO    Office Visit    8 months ago Chronic obstructive pulmonary disease, unspecified COPD type [facility])    John Negro MD    Office Visit    9 months ago Obesity (BMI 30-39. 9)    [facility], [facility], [place], Renaldo Walters, DO    Office Visit    10 months ago Benito Chaudhari annual wellness visit, subsequent    [facility], [address], [place] Renaldo Gee, DO    Office Visit    11 months ago Hypertension, unspecified type    [facility], [address], [facility], Renaldo Walters, DO    Office Visit

## 2023-12-17 RX ORDER — ATORVASTATIN CALCIUM 80 MG/1
80 TABLET, FILM COATED ORAL NIGHTLY
Qty: 90 TABLET | Refills: 3 | Status: SHIPPED | OUTPATIENT
Start: 2023-12-17

## 2023-12-17 NOTE — TELEPHONE ENCOUNTER
Refill passed per [facility] protocol.    Requested Prescriptions   Pending Prescriptions Disp Refills    ATORVASTATIN 80 MG Oral Tab [Pharmacy Med Name: ATORVASTATIN 80MG TABLETS] 90 tablet 0     Sig: TAKE 1 TABLET(80 MG) BY MOUTH EVERY NIGHT       Cholesterol Medication Protocol Passed - 12/17/2023 10:22 AM        Passed - ALT in past 12 months        Passed - LDL in past 12 months        Passed - Last ALT < 80     Lab Results   Component Value Date    ALT 36 01/24/2023             Passed - Last LDL < 130     Lab Results   Component Value Date    LDL 67 01/24/2023             Passed - In person appointment or virtual visit in the past 12 mos or appointment in next 3 mos     Recent Outpatient Visits              9 months ago Actinic keratoses    [facility] [place], [place]    Office Visit    9 months ago Chronic obstructive pulmonary disease, unspecified COPD type (HCC)    [facility] Levi Cee MD    Office Visit    10 months ago Obesity (BMI 30-39.9)    [facility], [place], David B,     Office Visit    10 months ago Medicare annual wellness visit, subsequent    [facility] [place], David B,     Office Visit    12 months ago Hypertension, unspecified type    [facility] [place], David B, DO    Office Visit                               Recent Outpatient Visits              9 months ago Actinic keratoses    [facility], [place], David B,     Office Visit    9 months ago Chronic obstructive pulmonary disease, unspecified COPD type (HCC)    [facility] Levi Cee MD    Office Visit    10 months ago Obesity (BMI 30-39.9)    [facility] [place], David B, DO     Office Visit    10 months ago Medicare annual wellness visit, subsequent    [facility], [facility] [address], [place], David B, DO    Office Visit    12 months ago Hypertension, unspecified type    [facility], [facility] [address], [place], David B, DO    Office Visit

## 2023-12-18 NOTE — TELEPHONE ENCOUNTER
Pharmacy requesting refill of       BRILINTA 90 MG Oral Tab, TAKE 1 TABLET(90 MG) BY MOUTH EVERY 12 HOURS, Disp: 90 tablet, Rfl: 0

## 2023-12-19 NOTE — TELEPHONE ENCOUNTER
Please review; protocol failed. Requested Prescriptions   Pending Prescriptions Disp Refills    ticagrelor (BRILINTA) 90 MG Oral Tab 90 tablet 1     Sig: Take 1 tablet (90 mg total) by mouth Q12H. There is no refill protocol information for this order        Recent Outpatient Visits              9 months ago Actinic keratoses    [facility], [address], [place], [place] Act[place]en, DO    Office Visit    9 months ago Chronic obstructive pulmonary disease, unspecified COPD type [facility])    Rj Gordillo MD    Office Visit    10 months ago Obesity (BMI 30-39. 9)    [facility], [address], [place], [place] Lawrence, DO    Office Visit    10 months ago Benito Chaudhari annual wellness visit, subsequent    [facility], [address], [place], [place] Lawrence, DO    Office Visit    12 months ago Hypertension, unspecified type    [facility], [address], [address] [place] [address] Lawrence, DO    Office Visit

## 2024-01-08 ENCOUNTER — OFFICE VISIT (OUTPATIENT)
Dept: FAMILY MEDICINE CLINIC | Facility: CLINIC | Age: 74
End: 2024-01-08
Payer: MEDICARE

## 2024-01-08 VITALS
SYSTOLIC BLOOD PRESSURE: 158 MMHG | BODY MASS INDEX: 38.41 KG/M2 | HEART RATE: 62 BPM | WEIGHT: 239 LBS | HEIGHT: 66 IN | DIASTOLIC BLOOD PRESSURE: 89 MMHG

## 2024-01-08 DIAGNOSIS — L25.9 CONTACT DERMATITIS, UNSPECIFIED CONTACT DERMATITIS TYPE, UNSPECIFIED TRIGGER: Primary | ICD-10-CM

## 2024-01-08 PROCEDURE — 1126F AMNT PAIN NOTED NONE PRSNT: CPT | Performed by: FAMILY MEDICINE

## 2024-01-08 PROCEDURE — 99213 OFFICE O/P EST LOW 20 MIN: CPT | Performed by: FAMILY MEDICINE

## 2024-01-08 RX ORDER — PREDNISONE 10 MG/1
10 TABLET ORAL 3 TIMES DAILY
Qty: 18 TABLET | Refills: 0 | Status: SHIPPED | OUTPATIENT
Start: 2024-01-08

## 2024-01-08 RX ORDER — KETOCONAZOLE 20 MG/G
1 CREAM TOPICAL 2 TIMES DAILY
Qty: 30 G | Refills: 1 | Status: SHIPPED | OUTPATIENT
Start: 2024-01-08

## 2024-01-08 NOTE — PROGRESS NOTES
Blood pressure 158/89, pulse 62, height 5' 6\" (1.676 m), weight 239 lb (108.4 kg).          Itchy rash for the past month.  No recent hotel stays or travel.  No new soaps or detergent.  Reports that the itching is from the knees all the way superior.  Otherwise no complaints.    Objective    Erythematous macular urticarial rash noted    There is dermatographia some seen on the back    Groin area with bilateral erythematous maculopapular rash noted    Assessment contact dermatitis tinea cruris    Plan ketoconazole cream prednisone for the itch follow-up in 3 weeks for Medicare physical

## 2024-01-26 ENCOUNTER — OFFICE VISIT (OUTPATIENT)
Dept: FAMILY MEDICINE CLINIC | Facility: CLINIC | Age: 74
End: 2024-01-26
Payer: MEDICARE

## 2024-01-26 VITALS
BODY MASS INDEX: 37.12 KG/M2 | HEART RATE: 65 BPM | DIASTOLIC BLOOD PRESSURE: 90 MMHG | SYSTOLIC BLOOD PRESSURE: 162 MMHG | WEIGHT: 231 LBS | HEIGHT: 66 IN

## 2024-01-26 DIAGNOSIS — Z86.73 HISTORY OF STROKE: ICD-10-CM

## 2024-01-26 DIAGNOSIS — J44.9 CHRONIC OBSTRUCTIVE PULMONARY DISEASE, UNSPECIFIED COPD TYPE (HCC): ICD-10-CM

## 2024-01-26 DIAGNOSIS — I25.10 CORONARY ARTERY DISEASE INVOLVING NATIVE CORONARY ARTERY OF NATIVE HEART WITHOUT ANGINA PECTORIS: ICD-10-CM

## 2024-01-26 DIAGNOSIS — I10 ESSENTIAL HYPERTENSION WITH GOAL BLOOD PRESSURE LESS THAN 140/90: ICD-10-CM

## 2024-01-26 DIAGNOSIS — R73.03 PREDIABETES: ICD-10-CM

## 2024-01-26 DIAGNOSIS — Z00.00 ENCOUNTER FOR ANNUAL HEALTH EXAMINATION: ICD-10-CM

## 2024-01-26 DIAGNOSIS — E78.2 MIXED HYPERLIPIDEMIA: ICD-10-CM

## 2024-01-26 DIAGNOSIS — F43.10 PTSD (POST-TRAUMATIC STRESS DISORDER): ICD-10-CM

## 2024-01-26 DIAGNOSIS — Z00.00 MEDICARE ANNUAL WELLNESS VISIT, SUBSEQUENT: Primary | ICD-10-CM

## 2024-01-26 RX ORDER — PREDNISONE 10 MG/1
10 TABLET ORAL 3 TIMES DAILY
Qty: 18 TABLET | Refills: 0 | Status: SHIPPED | OUTPATIENT
Start: 2024-01-26

## 2024-01-26 RX ORDER — CARVEDILOL 12.5 MG/1
12.5 TABLET ORAL 2 TIMES DAILY WITH MEALS
Qty: 60 TABLET | Refills: 1 | Status: SHIPPED | OUTPATIENT
Start: 2024-01-26

## 2024-01-26 NOTE — PROGRESS NOTES
Subjective:   Heath Olivia is a 73 year old male who presents for a Medicare Subsequent Annual Wellness visit (Pt already had Initial Annual Wellness) and scheduled follow up of multiple significant but stable problems.       History/Other:   Fall Risk Assessment:   He has been screened for Falls and is High Risk. Fall Prevention information provided to patient in After Visit Summary.    Do you feel unsteady when standing or walking?: No  Do you worry about falling?: Yes     Cognitive Assessment:   He had a completely normal cognitive assessment - see flowsheet entries     Functional Ability/Status:   Heath Olivia has some abnormal functions as listed below:  He has Dressing and/or Bathing issues based on screening of functional status.  Difficulty dressing or bathing?: Yes  Bathing or Showering: Able without help  Dressing: Able without help  He has difficulties Affording Meds based on screening of functional status. He has Hearing problems based on screening of functional status.He has Walking problems based on screening of functional status.       Depression Screening (PHQ-2/PHQ-9): PHQ-2 SCORE: 0  , done 1/26/2024   If you checked off any problems, how difficult have these problems made it for you to do your work, take care of things at home, or get along with other people?: Not difficult at all    Last [place] Suicide Screening on 1/26/2024 was No Risk.     5 minutes spent screening and counseling for depression    Advanced Directives:   He does NOT have a Living Will. [Do you have a living will?: No]  He does NOT have a Power of  for Health Care. [Do you have a healthcare power of ?: No]  Discussed Advance Care Planning with patient (and family/surrogate if present). Standard forms made available to patient in After Visit Summary.      Patient Active Problem List   Diagnosis    Hx of melanoma of skin    Nodule of finger    Personal history of malignant melanoma of skin     Mixed hyperlipidemia    Coronary artery disease involving native coronary artery of native heart without angina pectoris    PTSD (post-traumatic stress disorder)    Essential hypertension with goal blood pressure less than 140/90    History of posttraumatic stress disorder (PTSD)    Tubular adenoma of colon    Viral upper respiratory infection    Sensorineural hearing loss, bilateral    Chronic obstructive pulmonary disease (HCC)    Coronary artery disease    Orthopedic aftercare    Cellulitis and abscess of hand    Lymphangitis of upper extremity    Lightheadedness    Hypertension, unspecified type    Nausea    Morbid (severe) obesity due to excess calories (HCC)    Coronary artery disease involving native coronary artery of native heart with other form of angina pectoris (HCC)    History of stroke     Allergies:  He has No Known Allergies.    Current Medications:  Outpatient Medications Marked as Taking for the 1/26/24 encounter (Office Visit) with Heath Howe, DO   Medication Sig    predniSONE 10 MG Oral Tab Take 1 tablet (10 mg total) by mouth in the morning, at noon, and at bedtime.    carvedilol 12.5 MG Oral Tab Take 1 tablet (12.5 mg total) by mouth 2 (two) times daily with meals.    ketoconazole 2 % External Cream Apply 1 Application topically 2 (two) times daily.    ticagrelor (BRILINTA) 90 MG Oral Tab Take 1 tablet (90 mg total) by mouth Q12H.    atorvastatin 80 MG Oral Tab Take 1 tablet (80 mg total) by mouth nightly.    sertraline 100 MG Oral Tab Take 1.5 tablets (150 mg total) by mouth daily.    TRELEGY ELLIPTA 200-62.5-25 MCG/ACT Inhalation Aerosol Powder, Breath Activated INHALE 1 PUFF INTO THE LUNGS DAILY    finasteride 5 MG Oral Tab Take 1 tablet (5 mg total) by mouth daily.    EZETIMIBE 10 MG Oral Tab TAKE 1 TABLET(10 MG) BY MOUTH DAILY    losartan 50 MG Oral Tab Take 2 tablets (100 mg total) by mouth daily.    ascorbic acid 1000 MG Oral Tab Take 1 tablet (1,000 mg total) by mouth daily.     albuterol 108 (90 Base) MCG/ACT Inhalation Aero Soln Inhale 2 puffs into the lungs every 6 (six) hours as needed for Wheezing. inhale 2 puff by inhalation route  every 4 - 6 hours as needed       Medical History:  He  has a past medical history of Amputation, finger, traumatic, Colon cancer (HCC), Coronary atherosclerosis, Essential hypertension, High blood pressure, High cholesterol, History of blood clots, Hyperlipidemia, Malignant melanoma of right upper extremity (HCC) (Unknown), and Stroke (cerebrum) (HCC).  Surgical History:  He  has a past surgical history that includes knee replacement surgery (Right, ); knee replacement surgery (Left, ); hip replacement surgery (Right, ); colonoscopy; hernia surgery; bowel resection; cardiac catherterization (pbp) (2016); colonoscopy (N/A, 2017); cath drug eluting stent; total knee replacement; total hip replacement; Colectomy; and colonoscopy (N/A, 2021).   Family History:  His family history includes Heart Disease in his father and mother; Hypertension in his father and mother.  Social History:  He  reports that he quit smoking about 22 years ago. His smoking use included cigarettes. He has never used smokeless tobacco. He reports current alcohol use of about 5.0 standard drinks of alcohol per week. He reports that he does not use drugs.    Tobacco:  He smoked tobacco in the past but quit greater than 12 months ago.  Social History    Tobacco Use      Smoking status: Former        Packs/day: 0        Types: Cigarettes        Quit date: 2002        Years since quittin.0      Smokeless tobacco: Never       CAGE Alcohol Screen:   He has been screened for alcohol abuse and his score is not 0:  Cut: Have you ever felt you should Cut down on your drinking?: No  Annoyed: Have people Annoyed you by criticizing your drinking?: Yes  Guilty: Have you ever felt bad or Guilty about your drinking?: No  Eye Opener: Have you ever had a drink first  thing in the morning to steady your nerves or to get rid of a hangover (Eye opener)?: No  Total Score: 1      Patient Care Team:  Heath Howe DO as PCP - General (Family Medicine)  Angel Luis Shields MD as Consulting Physician (GASTROENTEROLOGY)  Dalton Silva MD (CARDIOLOGY)    Review of Systems  GENERAL: feels well otherwise  SKIN: denies any unusual skin lesions  EYES: denies blurred vision or double vision  HEENT: denies nasal congestion, sinus pain or ST  LUNGS: denies shortness of breath with exertion  CARDIOVASCULAR: denies chest pain on exertion  GI: denies abdominal pain, denies heartburn  : 1 per night nocturia, no complaint of urinary incontinence  MUSCULOSKELETAL: denies back pain  NEURO: denies headaches  PSYCHE: denies depression or anxiety  HEMATOLOGIC: denies hx of anemia  ENDOCRINE: denies thyroid history  ALL/ASTHMA: denies hx of allergy or asthma    Objective:   Physical Exam  General Appearance:  Alert, cooperative, no distress, appears stated age   Head:  Normocephalic, without obvious abnormality, atraumatic   Eyes:  PERRL, conjunctiva/corneas clear, EOM's intact, both eyes   Ears:  Normal TM's and external ear canals, both ears   Nose: Nares normal, septum midline, mucosa normal, no drainage or sinus tenderness   Throat: Lips, mucosa, and tongue normal; teeth and gums normal   Neck: Supple, symmetrical, trachea midline, no adenopathy, thyroid: not enlarged, symmetric, no tenderness/mass/nodules, no carotid bruit or JVD   Back:   Symmetric, no curvature, ROM normal, no CVA tenderness   Lungs:   Clear to auscultation bilaterally, respirations unlabored   Chest Wall:  No tenderness or deformity   Heart:  Regular rate and rhythm, S1, S2 normal, no murmur, rub or gallop   Abdomen:   Soft, non-tender, bowel sounds active all four quadrants,  no masses, no organomegaly   Genitalia: Normal male   Rectal: Normal tone, normal prostate, no masses or tenderness   Extremities: Extremities  normal, atraumatic, no cyanosis or edema   Pulses: 2+ and symmetric   Skin: Skin color, texture, turgor normal, no rashes or lesions   Lymph nodes: Cervical, supraclavicular, and axillary nodes normal   Neurologic: Normal     BP (!) 162/90   Pulse 65   Ht 5' 6\" (1.676 m)   Wt 231 lb (104.8 kg)   BMI 37.28 kg/m²  Estimated body mass index is 37.28 kg/m² as calculated from the following:    Height as of this encounter: 5' 6\" (1.676 m).    Weight as of this encounter: 231 lb (104.8 kg).    Medicare Hearing Assessment:   Hearing Screening    Time taken: 1/26/2024 11:33 AM  Screening Method: Whisper Test  Whisper Test Result: Pass               Visual Acuity:   Right Eye Visual Acuity: Corrected     Left Eye Visual Acuity: Corrected Left Eye Chart Acuity: 20/30   Both Eyes Visual Acuity: Corrected Both Eyes Chart Acuity: 20/30   Able To Tolerate Visual Acuity: Yes        Assessment & Plan:   Heath Olivia is a 73 year old male who presents for a Medicare Assessment.     1. Medicare annual wellness visit, subsequent (Primary)  2. Mixed hyperlipidemia  -     Comp Metabolic Panel (14); Future; Expected date: 07/27/2024  -     Hemoglobin A1C; Future; Expected date: 07/27/2024  -     Lipid Panel; Future; Expected date: 07/27/2024  3. Coronary artery disease involving native coronary artery of native heart without angina pectoris  4. PTSD (post-traumatic stress disorder)  5. Essential hypertension with goal blood pressure less than 140/90  6. Chronic obstructive pulmonary disease, unspecified COPD type (HCC)  7. Prediabetes  -     Comp Metabolic Panel (14); Future; Expected date: 07/27/2024  -     Hemoglobin A1C; Future; Expected date: 07/27/2024  -     Lipid Panel; Future; Expected date: 07/27/2024  8. History of stroke  Other orders  -     predniSONE; Take 1 tablet (10 mg total) by mouth in the morning, at noon, and at bedtime.  Dispense: 18 tablet; Refill: 0  -     Carvedilol; Take 1 tablet (12.5 mg total) by  mouth 2 (two) times daily with meals.  Dispense: 60 tablet; Refill: 1    The patient indicates understanding of these issues and agrees to the plan.  Consult ordered.  Reinforced healthy diet, lifestyle, and exercise.      Return in about 4 weeks (around 2/23/2024).     Heath Howe DO, 1/26/2024     Supplementary Documentation:   General Health:  In the past six months, have you lost more than 10 pounds without trying?: 2 - No  Has your appetite been poor?: No  Type of Diet: Other  How does the patient maintain a good energy level?: Other  How would you describe your daily physical activity?: None  How would you describe your current health state?: Fair  How do you maintain positive mental well-being?: Social Interaction;Games  On a scale of 0 to 10, with 0 being no pain and 10 being severe pain, what is your pain level?: 1 - (Mild)  In the past six months, have you experienced urine leakage?: 0-No  At any time do you feel concerned for the safety/well-being of yourself and/or your children, in your home or elsewhere?: No  Have you had any immunizations at another office such as Influenza, Hepatitis B, Tetanus, or Pneumococcal?: Yes        Heath Olivia's SCREENING SCHEDULE   Tests on this list are recommended by your physician but may not be covered, or covered at this frequency, by your insurer.   Please check with your insurance carrier before scheduling to verify coverage.   PREVENTATIVE SERVICES FREQUENCY &  COVERAGE DETAILS LAST COMPLETION DATE   Diabetes Screening    Fasting Blood Sugar / Glucose    One screening every 12 months if never tested or if previously tested but not diagnosed with pre-diabetes   One screening every 6 months if diagnosed with pre-diabetes Lab Results   Component Value Date    GLUCOSE 98 10/23/2006     (H) 01/24/2023        Cardiovascular Disease Screening    Lipid Panel  Cholesterol  Lipoprotein (HDL)  Triglycerides Covered every 5 years for all Medicare  beneficiaries without apparent signs or symptoms of cardiovascular disease Lab Results   Component Value Date    CHOLEST 162 01/24/2023    HDL 66 (H) 01/24/2023    LDL 67 01/24/2023    TRIG 173 (H) 01/24/2023         Electrocardiogram (EKG)   Covered if needed at Welcome to Medicare, and non-screening if indicated for medical reasons 01/25/2023      Ultrasound Screening for Abdominal Aortic Aneurysm (AAA) Covered once in a lifetime for one of the following risk factors    Men who are 65-75 years old and have ever smoked    Anyone with a family history -     Colorectal Cancer Screening  Covered for ages 50-85; only need ONE of the following:    Colonoscopy   Covered every 10 years    Covered every 2 years if patient is at high risk or previous colonoscopy was abnormal 08/02/2021    Health Maintenance   Topic Date Due    Colorectal Cancer Screening  08/02/2026       Flexible Sigmoidoscopy   Covered every 4 years -    Fecal Occult Blood Test Covered annually -   Prostate Cancer Screening    Prostate-Specific Antigen (PSA) Annually Lab Results   Component Value Date    PSA 0.4 08/14/2018     Health Maintenance   Topic Date Due    PSA  06/10/2024      Immunizations    Influenza Covered once per flu season  Please get every year 12/20/2023  No recommendations at this time    Pneumococcal Each vaccine ([id number] & [id number]) covered once after 65 Prevnar 13: 08/14/2018    Atcalszej14: 10/02/2019     No recommendations at this time    Hepatitis B One screening covered for patients with certain risk factors   -  No recommendations at this time    Tetanus Toxoid Not covered by Medicare Part B unless medically necessary (cut with metal); may be covered with your pharmacy prescription benefits -    Tetanus, Diptheria and Pertusis TD and TDaP Not covered by Medicare Part B -  No recommendations at this time    Zoster Not covered by Medicare Part B; may be covered with your pharmacy  prescription benefits 10/10/2012  Zoster  Vaccines(2 of 3) due on 12/05/2012     Annual Monitoring of Persistent Medications (ACE/ARB, digoxin diuretics, anticonvulsants)    Potassium Annually Lab Results   Component Value Date    K 4.6 01/24/2023         Creatinine   Annually Lab Results   Component Value Date    CREATSERUM 1.12 01/24/2023         BUN Annually Lab Results   Component Value Date    BUN 15 01/24/2023       Drug Serum Conc Annually No results found for: \"DIGOXIN\", \"DIG\", \"VALP\"           Chronic Obstructive Pulmonary Disease (COPD)    Spirometry Annually Spirometry date: 03/30/2023      1. Medicare annual wellness visit, subsequent  Pharmacy for vaccines    2. Mixed hyperlipidemia  Statin medication controlled  - Comp Metabolic Panel (14); Future  - Hemoglobin A1C; Future  - Lipid Panel; Future    3. Coronary artery disease involving native coronary artery of native heart without angina pectoris  Currently stable continue carvedilol Brilinta and atorvastatin    4. PTSD (post-traumatic stress disorder)  Patient doing well continue sertraline    5. Essential hypertension with goal blood pressure less than 140/90  Increased carvedilol uncontrolled at this time    6. Chronic obstructive pulmonary disease, unspecified COPD type (HCC)  Patient reports wheezing does not bother stable    7. Prediabetes  Stable we will check blood in 6 months  - Comp Metabolic Panel (14); Future  - Hemoglobin A1C; Future  - Lipid Panel; Future    8. History of stroke  Continue carvedilol and atorvastatin.

## 2024-01-26 NOTE — PATIENT INSTRUCTIONS
PHARMACY FOR SHINGLES AND RSV VACCINES  Heath Olivia's SCREENING SCHEDULE   Tests on this list are recommended by your physician but may not be covered, or covered at this frequency, by your insurer.   Please check with your insurance carrier before scheduling to verify coverage.   PREVENTATIVE SERVICES FREQUENCY &  COVERAGE DETAILS LAST COMPLETION DATE   Diabetes Screening    Fasting Blood Sugar / Glucose    One screening every 12 months if never tested or if previously tested but not diagnosed with pre-diabetes   One screening every 6 months if diagnosed with pre-diabetes Lab Results   Component Value Date    GLUCOSE 98 10/23/2006     (H) 01/24/2023        Cardiovascular Disease Screening    Lipid Panel  Cholesterol  Lipoprotein (HDL)  Triglycerides Covered every 5 years for all Medicare beneficiaries without apparent signs or symptoms of cardiovascular disease Lab Results   Component Value Date    CHOLEST 162 01/24/2023    HDL 66 (H) 01/24/2023    LDL 67 01/24/2023    TRIG 173 (H) 01/24/2023         Electrocardiogram (EKG)   Covered if needed at Welcome to Medicare, and non-screening if indicated for medical reasons 01/25/2023      Ultrasound Screening for Abdominal Aortic Aneurysm (AAA) Covered once in a lifetime for one of the following risk factors   • Men who are 65-75 years old and have ever smoked   • Anyone with a family history -     Colorectal Cancer Screening  Covered for ages 50-85; only need ONE of the following:    Colonoscopy   Covered every 10 years    Covered every 2 years if patient is at high risk or previous colonoscopy was abnormal 08/02/2021    Health Maintenance   Topic Date Due   • Colorectal Cancer Screening  08/02/2026       Flexible Sigmoidoscopy   Covered every 4 years -    Fecal Occult Blood Test Covered annually -   Prostate Cancer Screening    Prostate-Specific Antigen (PSA) Annually Lab Results   Component Value Date    PSA 0.4 08/14/2018     Health Maintenance    Topic Date Due   • PSA  06/10/2024      Immunizations    Influenza Covered once per flu season  Please get every year 12/20/2023  No recommendations at this time    Pneumococcal Each vaccine ([id number] & [id number]) covered once after 65 Prevnar 13: 08/14/2018    [id number]: 10/02/2019     No recommendations at this time    Hepatitis B One screening covered for patients with certain risk factors   -  No recommendations at this time    Tetanus Toxoid Not covered by Medicare Part B unless medically necessary (cut with metal); may be covered with your pharmacy prescription benefits -    Tetanus, Diptheria and Pertusis TD and TDaP Not covered by Medicare Part B -  No recommendations at this time    Zoster Not covered by Medicare Part B; may be covered with your pharmacy  prescription benefits 10/10/2012  Zoster Vaccines(2 of 3) due on 12/05/2012     Annual Monitoring of Persistent Medications (ACE/ARB, digoxin diuretics, anticonvulsants)    Potassium Annually Lab Results   Component Value Date    K 4.6 01/24/2023         Creatinine   Annually Lab Results   Component Value Date    CREATSERUM 1.12 01/24/2023         BUN Annually Lab Results   Component Value Date    BUN 15 01/24/2023       Drug Serum Conc Annually No results found for: \"DIGOXIN\", \"DIG\", \"VALP\"         Chronic Obstructive Pulmonary Disease (COPD)    Spirometry Annually Spirometry date: 03/30/2023

## 2024-02-02 ENCOUNTER — PATIENT MESSAGE (OUTPATIENT)
Dept: FAMILY MEDICINE CLINIC | Facility: CLINIC | Age: 74
End: 2024-02-02

## 2024-02-02 NOTE — TELEPHONE ENCOUNTER
From: Heath Olivia  To: Heath Howe  Sent: 2/2/2024 9:43 AM CST  Subject: Varinder’s med Brilinta    The price is 600+  He’s been taking it twice a day is there any substitute med for it!   He’s been on it since his stroke a couple yrs ago.   Does he need it  Thank you  Nazanin Olivia

## 2024-02-22 RX ORDER — FINASTERIDE 5 MG/1
5 TABLET, FILM COATED ORAL DAILY
Qty: 90 TABLET | Refills: 3 | Status: SHIPPED | OUTPATIENT
Start: 2024-02-22

## 2024-02-23 RX ORDER — EZETIMIBE 10 MG/1
10 TABLET ORAL DAILY
Qty: 90 TABLET | Refills: 1 | Status: SHIPPED | OUTPATIENT
Start: 2024-02-23

## 2024-02-23 NOTE — TELEPHONE ENCOUNTER
[employer] message sent  with scheduling and lab instructions.     CSS=please assist     LAST VISIT 1/26/24;    Return in about 4 weeks (around 2/23/2024).      No future appointments.        Please review; protocol failed/no protocol.     Requested Prescriptions   Pending Prescriptions Disp Refills    ezetimibe 10 MG Oral Tab [Pharmacy Med Name: EZETIMIBE 10MG TABLETS] 90 tablet 3     Sig: Take 1 tablet (10 mg total) by mouth daily.       Cholesterol Medication Protocol Failed - 2/21/2024  8:01 AM        Failed - ALT < 80     Lab Results   Component Value Date    ALT 36 01/24/2023             Failed - ALT resulted within past year        Failed - Lipid panel within past 12 months     Lab Results   Component Value Date    CHOLEST 162 01/24/2023    TRIG 173 (H) 01/24/2023    HDL 66 (H) 01/24/2023    LDL 67 01/24/2023    VLDL 26 01/24/2023    NONHDLC 96 01/24/2023             Passed - In person appointment or virtual visit in the past 12 mos or appointment in next 3 mos     Recent Outpatient Visits              3 weeks ago Medicare annual wellness visit, subsequent    [facility], [facility], [place] Ces[place]es, David B,     Office Visit    1 month ago Contact dermatitis, unspecified contact dermatitis type, unspecified trigger    [facility], [place], David B,     Office Visit    11 months ago Actinic keratoses    [facility], [facility], [place], David B, DO    Office Visit    11 months ago Chronic obstructive pulmonary disease, unspecified COPD type (HCC)    [facility], [facility], [place] Levi Cee MD    Office Visit    1 year ago Obesity (BMI 30-39.9)    [facility], [facility], [place], David B, DO    Office Visit                       Signed Prescriptions Disp Refills    finasteride 5 MG Oral Tab 90 tablet 3     Sig: Take 1 tablet (5 mg total) by mouth daily.        Genitourinary Medications Passed - 2/21/2024  8:01 AM        Passed - Patient does not have pulmonary hypertension on problem list        Passed - In person appointment or virtual visit in the past 12 mos or appointment in next 3 mos     Recent Outpatient Visits              3 weeks ago Medicare annual wellness visit, subsequent    [facility], [facility] [place], David B, DO    Office Visit    1 month ago Contact dermatitis, unspecified contact dermatitis type, unspecified trigger    [facility], [facility], [place], David B, DO    Office Visit    11 months ago Actinic keratoses    [facility], [facility] [place], David B, DO    Office Visit    11 months ago Chronic obstructive pulmonary disease, unspecified COPD type (HCC)    [facility], [facility]    Office Visit    1 year ago Obesity (BMI 30-39.9)    [facility], [facility] [place], David B, DO    Office Visit                            Recent Outpatient Visits              3 weeks ago Medicare annual wellness visit, subsequent    [facility], [facility] [place], David B, DO    Office Visit    1 month ago Contact dermatitis, unspecified contact dermatitis type, unspecified trigger    [facility], [facility] [place] Ces[place], David B, DO    Office Visit    11 months ago Actinic keratoses    [facility], [facility], [place], David B, DO    Office Visit    11 months ago Chronic obstructive pulmonary disease, unspecified COPD type (HCC)    [facility], [facility], Levi Bojorquez MD    Office Visit    1 year ago Obesity (BMI 30-39.9)    [facility], [facility] [place], David B, DO    Office Visit

## 2024-02-23 NOTE — TELEPHONE ENCOUNTER
Refill passed per [facility] protocol.     Requested Prescriptions   Pending Prescriptions Disp Refills    FINASTERIDE 5 MG Oral Tab [Pharmacy Med Name: FINASTERIDE 5MG TABLETS] 90 tablet 3     Sig: TAKE 1 TABLET(5 MG) BY MOUTH DAILY       Genitourinary Medications Passed - 2/21/2024  8:01 AM        Passed - Patient does not have pulmonary hypertension on problem list        Passed - In person appointment or virtual visit in the past 12 mos or appointment in next 3 mos     Recent Outpatient Visits              3 weeks ago Medicare annual wellness visit, subsequent    [facility] [place] Cespedes, David B, DO    Office Visit    1 month ago Contact dermatitis, unspecified contact dermatitis type, unspecified trigger    [facility] [place] [facility] [place]    Office Visit    11 months ago Actinic keratoses    [facility] [place] [facility]    Office Visit    11 months ago Chronic obstructive pulmonary disease, unspecified COPD type (HCC)    [facility], [facility] Levi Cee MD    Office Visit    1 year ago Obesity (BMI 30-39.9)    [facility], [facility] [place] Cespedes, David B, DO    Office Visit                        EZETIMIBE 10 MG Oral Tab [Pharmacy Med Name: EZETIMIBE 10MG TABLETS] 90 tablet 3     Sig: TAKE 1 TABLET(10 MG) BY MOUTH DAILY       Cholesterol Medication Protocol Failed - 2/21/2024  8:01 AM        Failed - ALT < 80     Lab Results   Component Value Date    ALT 36 01/24/2023             Failed - ALT resulted within past year        Failed - Lipid panel within past 12 months     Lab Results   Component Value Date    CHOLEST 162 01/24/2023    TRIG 173 (H) 01/24/2023    HDL 66 (H) 01/24/2023    LDL 67 01/24/2023    VLDL 26 01/24/2023    NONHDLC 96 01/24/2023             Passed - In person appointment or virtual visit in the  past 12 mos or appointment in next 3 mos     Recent Outpatient Visits              3 weeks ago Medicare annual wellness visit, subsequent    [facility], [facility], [place] Cespedes, David B, DO    Office Visit    1 month ago Contact dermatitis, unspecified contact dermatitis type, unspecified trigger    [facility], [facility], [place] Cespedes, David B, DO    Office Visit    11 months ago Actinic keratoses    [facility], [facility], [place] Cespedes, David B, DO    Office Visit    11 months ago Chronic obstructive pulmonary disease, unspecified COPD type (HCC)    [facility]    Office Visit    1 year ago Obesity (BMI 30-39.9)    [facility], [facility], [place] Cespedes, David B, DO    Office Visit                                 Recent Outpatient Visits              3 weeks ago Medicare annual wellness visit, subsequent    [facility], [facility], [place] Cespedes, David B, DO    Office Visit    1 month ago Contact dermatitis, unspecified contact dermatitis type, unspecified trigger    [facility], [facility], [place] Cespedes, David B, DO    Office Visit    11 months ago Actinic keratoses    [facility], [facility], [place] Cespedes, David B, DO    Office Visit    11 months ago Chronic obstructive pulmonary disease, unspecified COPD type (HCC)    [facility], [facility] [place] [facility] [place]    Office Visit    1 year ago Obesity (BMI 30-39.9)    [facility], [facility], [place] Ces[place]es, David B, DO    Office Visit

## 2024-02-26 NOTE — TELEPHONE ENCOUNTER
Speech-Language Pathology Visit    Visit Type: Daily Treatment Note  Visit: 2  Referring Provider: Trniidad Erwin MD  Medical Diagnosis (from order): Diagnosis Information    Diagnosis  434.91 (ICD-9-CM) - I63.9 (ICD-10-CM) - Acute CVA (cerebrovascular accident) (CMD)      Treatment diagnosis:   Cognitive Communication Deficit  Date of onset: 5/28/23  Chart reviewed at time of initial evaluation (relevant co-morbidities, allergies, tests and medications listed): At this time, the patient demonstrates mild impairments in memory and problem solving/executive function which limit the performance of communication/cognition.  Patient is currently needing  mild cueing for communication/cognition.   MRI: Evolving acute ischemia within the SCA territory of the right  cerebellum and minimally within the adjacent right dorsal sonia.   2.  No evidence for labyrinthitis.   3.  Bilateral mastoid air cell opacification, subtotal on the right and  mild on the left.          SUBJECTIVE                                                                                                             GENERAL INFO:  Works part time in [employer]. Lives alone. Drives.     Pt is using planner at home. Using pill organizer.   Pain / Symptoms:  - patient reports pain is not an issue/concern  - Hearing: wears hearing aid right and wears hearing aid left  - Vision:      • Current vision: wears glasses all the time.    Function:  - Current functional limitations / exacerbation factors:  remembering. Mild memory issues per pt.   - Prior level of function: no concerns with communication, no concerns with cognition and no concerns with voice.  Prior treatment: inpatient PT, inpatient speech, inpatient OT  Discharged from hospital, home health, or skilled nursing facility in last 30 days: yes  Home Environment:   Patient lives with: alone  Assistance available: consistent  Feels safe at home/work/school.  2 or more falls or an unexplained fall with  Spoke to Rosalie Ratliff/wife/on ANTONIO; they are moving in one week, things are hectic, she will have  call to schedule   injury in the last year:  No      OBJECTIVE                                                                                                                               Communication/Cognition  Memory:       - Immediate recall: shapes: 100% with memory strategies and no cues.  7 words using association and story tellin% accuracy with min cues.   Faces recall 100% with no cues.   Delayed recall: shapes: 100% accuracy with memory strategies, faces: 100% accuracy with  No cues.  7 words 100% accuracy with no cues using memory strategies.   Prospective recall: recalled   Task with no cues.     Memory Strategies: Memory strategies such as  C.A.R.E. (concentrate, associate, rehearse and external strategies) discussed.                     Home Exercise Program/Education Materials  Thought organization ex- category matrix  Memory strategies educational packet and memory ex    ASSESSMENT                                                                                                            Cognitive Communication Deficit  Today's session focused on skilled instruction, facilitation/stimulation exercises and compensatory strategies to improve memory using memory strategies. Pt given skilled instruction on memory and memory strategies.  He completed immediate and delayed recall tasks using memory strategies with no significant difficulties.  Thought organization task given as homework also.  He is now using a planner and pillbox to aid his recall for daily living.       Education:   - Present and ready to learn: patient  - Results of above outlined education: Verbalizes understanding    PLAN                                                                                                                         The following skilled interventions to be implemented to achieve goals listed below:  Treatment of Speech Language (17938)  Cognitive Treatment (06563, 35456)    Frequency / Duration: 1 times per week  tapering as patient progresses for an estimated total of 3 visits for 3 weeks  Patient involved in and agreed to plan of care and goals.    GOALS                                                                                                                           Long Term Goals: to be met by end of plan of care  1. Patient will improve short term working memory for IADL's, improved recall, and new learning at 90% accuracy given no cues with compensatory strategies.  2. Patient will perform complex language/thought processing tasks such as problem solving, reasoning, and higher level executive functioning in order to improve safety and awareness in functional living environment at 90% accuracy with no cues.      Therapy procedure time and total treatment time can be found documented on the Time Entry flowsheet

## 2024-02-29 RX ORDER — CARVEDILOL 6.25 MG/1
6.25 TABLET ORAL 2 TIMES DAILY WITH MEALS
Qty: 180 TABLET | Refills: 0 | OUTPATIENT
Start: 2024-02-29

## 2024-03-06 ENCOUNTER — PATIENT MESSAGE (OUTPATIENT)
Dept: FAMILY MEDICINE CLINIC | Facility: CLINIC | Age: 74
End: 2024-03-06

## 2024-03-07 RX ORDER — CARVEDILOL 12.5 MG/1
12.5 TABLET ORAL 2 TIMES DAILY WITH MEALS
Qty: 60 TABLET | Refills: 0 | Status: SHIPPED | OUTPATIENT
Start: 2024-03-07

## 2024-03-07 RX ORDER — CARVEDILOL 12.5 MG/1
12.5 TABLET ORAL 2 TIMES DAILY WITH MEALS
Qty: 180 TABLET | Refills: 0 | OUTPATIENT
Start: 2024-03-07

## 2024-03-07 NOTE — TELEPHONE ENCOUNTER
Duplicate request, previously addressed.      Too soon     Disp Refills Start End    carvedilol 12.5 MG Oral Tab 60 tablet 1 1/26/2024 --    Sig - Route: Take 1 tablet (12.5 mg total) by mouth 2 (two) times daily with meals. - Oral    Sent to pharmacy as: Carvedilol 12.5 MG Oral Tablet (Coreg)    E-Prescribing Status: Receipt confirmed by pharmacy (1/26/2024 12:01 PM CST)      Pharmacy    [id number] - [place], [place] - [address] AT [facility], [phone], [phone]

## 2024-03-07 NOTE — TELEPHONE ENCOUNTER
From: Heath Olivia  To: Heath Howe  Sent: 3/6/2024 2:57 PM CST  Subject: Prescription recently sent carvedilol 12.5 MG Tabs    We arr moving to [place] Saturday. Pls renew the 90 day prescription. We’ll get new drs in [facility]. I’ll request my records from you as soon as we get there.

## 2024-03-07 NOTE — TELEPHONE ENCOUNTER
Please review; protocol failed.  Pt is overdue for labs.  Hypertensive Medications  Protocol Criteria:  Appointment scheduled in the past 6 months or in the next 3 months  BMP or CMP in the past 12 months  Creatinine result < 2  Recent Outpatient Visits              1 month ago Medicare annual wellness visit, subsequent    [facility], [facility], [place] Cespedes, David B, DO    Office Visit    1 month ago Contact dermatitis, unspecified contact dermatitis type, unspecified trigger    [facility] [place] [facility] [place]    Office Visit    11 months ago Actinic keratoses    [facility]    Office Visit    11 months ago Chronic obstructive pulmonary disease, unspecified COPD type (HCC)    [facility], [facility], [place] Levi Cee MD    Office Visit    1 year ago Obesity (BMI 30-39.9)    [facility]    Office Visit            Lab Results   Component Value Date     (H) 01/24/2023    BUN 15 01/24/2023    CREATSERUM 1.12 01/24/2023    BUNCREA 13.4 01/24/2023    GFRNAA 73 06/10/2022    GFRAA 84 06/10/2022    CA 10.2 (H) 01/24/2023    ALKPHOS 97 08/25/2015    AST 19 01/24/2023    ALT 36 01/24/2023    BILT 0.7 01/24/2023    TP 7.2 01/24/2023    ALB 4.0 01/24/2023     01/24/2023    K 4.6 01/24/2023     01/24/2023    CO2 28.0 01/24/2023    GLOBULIN 3.2 01/24/2023    AGRATIO 1.4 08/25/2015    ANIONGAP 9 01/24/2023    OSMOCALC 293 01/24/2023

## 2024-03-19 PROBLEM — J06.9 VIRAL UPPER RESPIRATORY INFECTION: Status: RESOLVED | Noted: 2019-02-07 | Resolved: 2024-03-19

## 2024-05-03 ENCOUNTER — TELEPHONE (OUTPATIENT)
Dept: FAMILY MEDICINE CLINIC | Facility: CLINIC | Age: 74
End: 2024-05-03

## 2024-05-03 RX ORDER — CARVEDILOL 12.5 MG/1
12.5 TABLET ORAL 2 TIMES DAILY WITH MEALS
Qty: 60 TABLET | Refills: 0 | Status: CANCELLED | OUTPATIENT
Start: 2024-05-03

## 2024-05-03 RX ORDER — SERTRALINE HYDROCHLORIDE 100 MG/1
150 TABLET, FILM COATED ORAL DAILY
Qty: 135 TABLET | Refills: 3 | OUTPATIENT
Start: 2024-05-03

## 2024-05-20 NOTE — TELEPHONE ENCOUNTER
Pt needs a call back on if it is ok to stay on the plavix while they do the steroid injection in her neck. She is having her injection on 6/6/24     She reports that someone from the hospital told her that she should not take it.    Please advise       appt made wife will bring pt at 120

## 2024-08-30 NOTE — TELEPHONE ENCOUNTER
Current Outpatient Medications on File Prior to Visit   Medication Sig Dispense Refill    carvedilol 12.5 MG Oral Tab Take 1 tablet (12.5 mg total) by mouth 2 (two) times daily with meals. 180 tablet 0      90 tablet 1      90 tablet 3      18 tablet 0      30 g 1      90 tablet 1      90 tablet 3      135 tablet 3      60 each 3      90 tablet 0             1 each 3     No current facility-administered medications on file prior to visit.

## 2024-09-03 ENCOUNTER — PATIENT MESSAGE (OUTPATIENT)
Dept: FAMILY MEDICINE CLINIC | Facility: CLINIC | Age: 74
End: 2024-09-03

## 2024-09-03 RX ORDER — CARVEDILOL 12.5 MG/1
12.5 TABLET ORAL 2 TIMES DAILY WITH MEALS
Qty: 180 TABLET | Refills: 0 | OUTPATIENT
Start: 2024-09-03

## 2024-09-03 NOTE — TELEPHONE ENCOUNTER
Per patient marianelat message encounter 3/6/2024:  We are searching for a family dr  so far it’s been unsuccessful. Drs in this area of [place] are only going through the motions and doing that poorly.   I need my prescription for Carvedilol 12.5 mg tablets refilled. Please!     No future appointments.  LAST OFFICE VISIT: 1/26/2024  Lab work last completed 1/24/2023, outstanding labs from 1/26/24 not completed.

## 2024-09-04 NOTE — TELEPHONE ENCOUNTER
See alternate encounter- refill request.   From: Heath Olivia  To: Heath Howe  Sent: 9/3/2024 10:49 AM CDT  Subject: Prescription     I texted someone there in response to med. we have a family dr. She’s seen me and is filling the prescription.   Thank you

## 2025-03-04 RX ORDER — FINASTERIDE 5 MG/1
5 TABLET, FILM COATED ORAL DAILY
Qty: 90 TABLET | Refills: 0 | OUTPATIENT
Start: 2025-03-04

## 2025-03-04 NOTE — TELEPHONE ENCOUNTER
Talked to Rosalie Ratliff ([facility]) she says that they moved to [place] for over a year now and she notify the pharmacy on file already but she will notify them again.

## 2025-03-04 NOTE — TELEPHONE ENCOUNTER
Please review; protocol failed/No Protocol      Last Office Visit: 01/26/2024  [employer] message sent to patient to schedule an office visit.  Will route to Call Center to call to make an appointment.

## 2025-06-12 NOTE — PROCEDURES
[facility]     Pulmonary Function Test     Isabell Dumas Patient Status:  Outpatient    1950 MRN [id number]   Date of Exam 20 PCP Nola Prieto.  DO Shyam           Spirometry   FEV1: 1.98 72%  FVC: 2.93 78%  FEV1/FVC: 0.68 No

## (undated) DIAGNOSIS — F43.10 PTSD (POST-TRAUMATIC STRESS DISORDER): ICD-10-CM

## (undated) DIAGNOSIS — I10 ESSENTIAL HYPERTENSION: ICD-10-CM

## (undated) NOTE — ED AVS SNAPSHOT
Nicholas Ag   MRN: [id number]    Department:  [facility] Emergency [facility]   Date of Visit:  8/24/2019           Disclosure     Insurance plans vary and the physician(s) referred by the ER may not be covered by your plan.  Please co within the next three months to obtain basic health screening including reassessment of your blood pressure.     IF THERE IS ANY CHANGE OR WORSENING OF YOUR CONDITION, CALL YOUR PRIMARY CARE PHYSICIAN AT ONCE OR RETURN IMMEDIATELY TO THE EMERGENCY DEPARTMEN

## (undated) NOTE — IP AVS SNAPSHOT
[facility]            (For Outpatient Use Only) Initial Admit Date: 2022   Inpt/Obs Admit Date: Inpt: 8/10/22 / Obs: 22   Discharge Date:    Jarocho Cornea:  [de-identified]   MRN: [de-identified]   CSN: [id number]   CEID: [id number]        ENCOUNTER  Patient Class: Inpatient Admitting Provider: Meet Foote MD Unit: [address] [place] [address]   Hospital Service: Cardiac Telemetry Attending Provider: Adriane Flaherty MD   Bed: 340-A   Visit Type:   Referring Physician: No ref. provider found Billing Flag:    Admit Diagnosis: Lightheadedness [R42]      PATIENT  Legal Name:   Mary Patel   Legal Sex: Male  Gender ID:              Pref Name:    PCP:  Holly Romero DO Home: [phone]   Address:  [address] [place] [address] Street: 1950 (72 yrs) Mobile: [phone]         City/[facility]: [facility] [address] Marital:  Language: [place]: Curt SSN4: [id number] Rastafari: [facility] Not Shannan Colbert*     Race: White Ethnicity: Non  Or [address] [place] [address]   Name Relationship Legal Guardian? Home Phone Work Phone Mobile Phone   1. Rosalie Olivia  2. *No Contact Specified* Spouse      [phone]       GUARANTOR  Guarantor: Mary Patel : 1950 Home Phone: [phone]   Address: [address] [place] [address]   Sex:  Male Work Phone:    City/[facility]/[address]   Rel. to Patient: Self Guarantor ID: [id number]   [address]   Employer:  Status: RETIRED     COVERAGE  PRIMARY INSURANCE   Payor: MEDICARE Plan: MEDICARE PART A&B   Group Number:  Insurance Type: INDEMNITY   Subscriber Name: Johan Lee : 1950   Subscriber ID: [id number] Pt Rel to Subscriber: Self   SECONDARY INSURANCE   Payor: [facility] INDEMNITY Plan: [address] [place] [address] INDEMNITY   Group Number: [id number] Insurance Type: [id number] Name: Johan Lee : 1950   Subscriber ID: [id number] Pt Rel to Subscriber: SELF   TERTIARY INSURANCE   Payor:  Plan:    Group Number:  Insurance Type:    Subscriber Name:  Subscriber :    Subscriber ID:  Pt Rel to Subscriber:    Hospital Account Financial Class: Medicare    2022

## (undated) NOTE — MR AVS SNAPSHOT
[facility]  [address] [place] [address], [address] [place], [place] [address]               Thank you for choosing us for your health care visit with Pedro Salcido MD.  We are glad to serve you and happy to provide you with this summary of Take one capsule at bedtime. Commonly known as:  NEURONTIN           hydrochlorothiazide 12.5 MG Tabs   Take 1 tablet (12.5 mg total) by mouth daily.    Commonly known as:  NAVAIDEVORA GUTHRIE VITAMIN D 1000 units Caps   Generic drug:  cholecalcifero

## (undated) NOTE — ED AVS SNAPSHOT
[facility] in [address] [facility]  [address]    Phone:  [phone]    Fax:  [address]   MRN: [id number]    Department:  [facility] in [address] [place] [address]   Date of Visit: Insurance plans vary and the physician(s) referred by the Immediate Care may not be covered by your plan. It is possible that the physician may not participate in your health insurance plan.   This may result in a lower benefit level being available to yo CARE PHYSICIAN AT ONCE OR GO TO THE EMERGENCY DEPARTMENT. If you have been prescribed any medication(s), please fill your prescription right away and begin taking the medication(s) as directed.   If you believe that any of the medications or instructions - If you have concerns related to behavioral health issues or thoughts of harming yourself, contact [address] [place] [address] at [phone].     - If you don’t have insurance, Traci Iqbal has partnered with [employer]

## (undated) NOTE — MR AVS SNAPSHOT
[facility]  [address] [place] [address], [address] [facility]  [place]               Thank you for choosing us for your health care visit with Phu Truong. DO Shyam.   We are glad to serve you and happy to provide you with this summary - Another medication with the same name was removed. Continue taking this medication, and follow the directions you see here. Commonly known as:  ZYLOPRIM           aspirin 81 MG Tabs   Take 1 tablet (81 mg total) by mouth daily.    What changed:  Another PSA (Screening) [E]    Complete by:  Jan 12, 2017 (Approximate)    Assoc Dx:  Encounter for Medicare annual wellness exam [Z00.00], Screening for prostate cancer [Z12.5]           Uric Acid, Serum [E]    Complete by:  Jan 12, 2017 (Approximate)    Assoc D You can access your MyChart to more actively manage your health care and view more details from this visit by going to [email or web address].   If you've recently had a stay at the Hospital you can access your discharge instructions in [address] by david

## (undated) NOTE — ED AVS SNAPSHOT
[facility] Emergency Department  [place] [address]   [place] [address]  Phone:  [phone]  Fax:  [phone]          Allyn Nageotte   MRN: [id number]    Department:  [facility] Emergency Department   Date of Visit:  6/27/2017 and Class Registration line at [phone] or find a doctor online by visiting [email or web address].    IF THERE IS ANY CHANGE OR WORSENING OF YOUR CONDITION, CALL YOUR PRIMARY CARE PHYSICIAN AT ONCE OR RETURN IMMEDIATELY TO [address] [place] [address].     If

## (undated) NOTE — LETTER
AUTHORIZATION FOR SURGICAL OPERATION OR OTHER PROCEDURE    1.  I hereby authorize Dr. Helga Gonsales, and [facility] staff assigned to my case to perform the following operation and/or procedure at the [facility]:    _____________aspiration & cortis Name:  ______________________________________________________  (please print)      Patient signature:  ___________________________________________________             Relationship to Patient:           []  Parent    Responsible person

## (undated) NOTE — LETTER
[address], [place]  Authorization for Invasive Procedures  1.  I hereby authorize  Dr. Sonal Varghese  , my physician and whomever may be designated as the doctor's assistant, to perform the following operation and/or procedure: Cardiac Ca 5. I consent to the photographing of the operations or procedures to be performed for the purposes of advancing medicine, science, and/or education, provided my identity is not revealed.  If the procedure has been videotaped, the physician/surgeon will obta __________ Time: ___________    Statement of Physician  My signature below affirms that prior to the time of the procedure, I have explained to the patient and/or his legal representative, the risks and benefits involved in the proposed treatment and any r

## (undated) NOTE — LETTER
[facility], [place]  Authorization for Invasive Procedures  1. I hereby authorize Dr. Sally Wan, my physician and whomever may be designated as the doctor's assistant, to perform the following operation and/or procedure:  Inc potential risks that can occur: fever and allergic reactions, hemolytic reactions, transmission of disease such as hepatitis, AIDS, cytomegalovirus (CMV), and flluid overload.  In the event that I wish to have autologous transfusions of my own blood, or a d judgment of my physician.      Signature of Patient:  ________________________________________________ Date: _________Time: _________    Responsible person in case of minor or unconscious: _____________________________Relationship: ____________     Witness